# Patient Record
Sex: FEMALE | Race: BLACK OR AFRICAN AMERICAN | NOT HISPANIC OR LATINO | URBAN - METROPOLITAN AREA
[De-identification: names, ages, dates, MRNs, and addresses within clinical notes are randomized per-mention and may not be internally consistent; named-entity substitution may affect disease eponyms.]

---

## 2018-01-09 ENCOUNTER — APPOINTMENT (OUTPATIENT)
Dept: PHYSICAL THERAPY | Facility: CLINIC | Age: 58
End: 2018-01-09
Payer: COMMERCIAL

## 2018-01-09 PROCEDURE — 97163 PT EVAL HIGH COMPLEX 45 MIN: CPT

## 2018-01-09 PROCEDURE — G8991 OTHER PT/OT GOAL STATUS: HCPCS

## 2018-01-09 PROCEDURE — 97110 THERAPEUTIC EXERCISES: CPT

## 2018-01-09 PROCEDURE — G8990 OTHER PT/OT CURRENT STATUS: HCPCS

## 2018-01-15 ENCOUNTER — APPOINTMENT (OUTPATIENT)
Dept: PHYSICAL THERAPY | Facility: CLINIC | Age: 58
End: 2018-01-15
Payer: COMMERCIAL

## 2018-01-15 PROCEDURE — 97140 MANUAL THERAPY 1/> REGIONS: CPT

## 2018-01-15 PROCEDURE — 97110 THERAPEUTIC EXERCISES: CPT

## 2018-01-22 ENCOUNTER — APPOINTMENT (OUTPATIENT)
Dept: PHYSICAL THERAPY | Facility: CLINIC | Age: 58
End: 2018-01-22
Payer: COMMERCIAL

## 2018-01-22 PROCEDURE — 97110 THERAPEUTIC EXERCISES: CPT

## 2018-01-22 PROCEDURE — 97140 MANUAL THERAPY 1/> REGIONS: CPT

## 2018-01-23 ENCOUNTER — APPOINTMENT (OUTPATIENT)
Dept: PHYSICAL THERAPY | Facility: CLINIC | Age: 58
End: 2018-01-23
Payer: COMMERCIAL

## 2018-01-29 ENCOUNTER — OFFICE VISIT (OUTPATIENT)
Dept: PHYSICAL THERAPY | Facility: CLINIC | Age: 58
End: 2018-01-29
Payer: COMMERCIAL

## 2018-01-29 DIAGNOSIS — M25.552 PAIN OF LEFT HIP JOINT: Primary | ICD-10-CM

## 2018-01-29 PROCEDURE — 97112 NEUROMUSCULAR REEDUCATION: CPT | Performed by: PHYSICAL THERAPIST

## 2018-01-29 PROCEDURE — 97110 THERAPEUTIC EXERCISES: CPT | Performed by: PHYSICAL THERAPIST

## 2018-01-29 PROCEDURE — 97140 MANUAL THERAPY 1/> REGIONS: CPT | Performed by: PHYSICAL THERAPIST

## 2018-01-30 ENCOUNTER — APPOINTMENT (OUTPATIENT)
Dept: PHYSICAL THERAPY | Facility: CLINIC | Age: 58
End: 2018-01-30
Payer: COMMERCIAL

## 2018-01-30 NOTE — PROGRESS NOTES
Daily Note     Today's date: 2018  Patient name: Magalys Solano  : 1960  MRN: [id number]  Referring provider: Nallely Woody MD  Dx: No diagnosis found  Subjective: Patient reports that she had a decrease in pain following her last visit, however states that the pain increased on her walk home  She states that she is currently having pain of 8/10 in the anterior aspect on her left hip  She states that this increased with sitting for prolonged periods of time as well as performing sit to stand transfers  Objective: See treatment diary below  Precautions:   2 strokes (), (), mini stroke ),, both affected left side, angina (has nitroglycerine), Right hip DANIA (), Left , stomach surgery infection, hospitalized for HTN    Daily Treatment Diary     Manual  2018            Left hip flexor release  TK            Left hip PROM TK            Femoral Nerve glides TK                                          Exercise Diary  2018            Prone Press Up 2 x 10            Femoral Nerve Glides 15            Lateral walking 5 laps            Standing hip abd 2 x 10            Standing hip extension 2 x 10            [place] (to 90 degrees) 20            Total Gym Bilateral Squat  Level 3 2 x 10            Left leg single leg squat 2 x 10 level 3            Upright bike  10 min            Mini Squat 2 x 10                                                                                                                                                  Modalities                                                               Assessment: Patient reported no change in symptoms following manual treatment this visit  She continues to report increase in pain with all left hip PROM including flexion and ABD  Pain with flexion was abolished when performing in quadruped position  Patient was able to perform left single leg squats on total gym without increase in pain   She will continue to benefit from PT in order to improve left hip ROM and strength  Plan: Continue per plan of care

## 2018-02-05 ENCOUNTER — OFFICE VISIT (OUTPATIENT)
Dept: PHYSICAL THERAPY | Facility: CLINIC | Age: 58
End: 2018-02-05
Payer: COMMERCIAL

## 2018-02-05 DIAGNOSIS — M25.552 PAIN OF LEFT HIP JOINT: Primary | ICD-10-CM

## 2018-02-05 PROCEDURE — 97112 NEUROMUSCULAR REEDUCATION: CPT

## 2018-02-05 PROCEDURE — 97110 THERAPEUTIC EXERCISES: CPT

## 2018-02-05 PROCEDURE — 97140 MANUAL THERAPY 1/> REGIONS: CPT

## 2018-02-05 NOTE — PROGRESS NOTES
Daily Note     Today's date: 2018  Patient name: Evangelina Shirley  : 1960  MRN: [id number]  Referring provider: Jeannette Guerra MD  Dx:   Encounter Diagnosis   Name Primary?  Pain of left hip joint Yes         Subjective: Patient presented to clinic this morning with minimal complaints of pain in her low back but notes increased pain in her L hip  Objective: See treatment diary below  Precautions:   2 strokes (), (), mini stroke ),, both affected left side, angina (has nitroglycerine), Right hip DANIA (), Left , stomach surgery infection, hospitalized for HTN    Daily Treatment Diary     Manual  2018           Left hip flexor release  TK MM           Left hip PROM TK MM           Femoral Nerve glides TK MM                                         Exercise Diary             Prone Press Up 2 x 10 2x10           Femoral Nerve Glides 15 15           Lateral walking 5 laps 5 laps           Standing hip abd 2 x 10 2x10           Standing hip extension 2 x 10 2x10           [place] (to 90 degrees) 20 20           Total Gym Bilateral Squat  Level 3 2 x 10 Lvl 3 2x10           Left leg single leg squat 2 x 10 level 3 Lvl 3 2x10           Upright bike  10 min 10'           Mini Squat 2 x 10 2x10           Sit to stand high low table  2x10                                                                                                                                    Modalities                                                           Assessment: Patient reported no change in symptoms following manual treatment this visit  She continues to have pain with active movement, especially supine hip flexion  Guarding was noted with PROM today, and extreme tenderness in the hip flexor was noted with palpation  Continued with all exercises today having relief with standing/walking  No pain or increased symptoms present with standing Te  Plan: Continue per plan of care  Continue to improve LE strength as per PT POC

## 2018-02-06 ENCOUNTER — OFFICE VISIT (OUTPATIENT)
Dept: PHYSICAL THERAPY | Facility: CLINIC | Age: 58
End: 2018-02-06
Payer: COMMERCIAL

## 2018-02-06 DIAGNOSIS — M25.552 PAIN OF LEFT HIP JOINT: Primary | ICD-10-CM

## 2018-02-06 PROCEDURE — 97110 THERAPEUTIC EXERCISES: CPT | Performed by: PHYSICAL THERAPIST

## 2018-02-07 NOTE — PROGRESS NOTES
Daily Note     Today's date: 2018  Patient name: Wellington Deras  : 1960  MRN: [id number]  Referring provider: David Gregory MD  Dx:   Encounter Diagnosis   Name Primary?  Pain of left hip joint Yes         Subjective: Patient reports that she continues to have pain in her anterior left hip with prolonged walking and sitting  Objective: See treatment diary below  Precautions: **VITALS BEFORE AND AFTER VISIT**  2 strokes (), (), mini stroke ),, both affected left side, angina (has nitroglycerine), Right hip DANIA (), Left , stomach surgery infection, hospitalized for HTN     Daily Treatment Diary      Manual  2018                 Left hip flexor release  TK MM                   Left hip PROM TK MM                   Femoral Nerve glides TK MM                                                                         Exercise Diary    2                 Prone Press Up 2 x 10 2x10                   Femoral Nerve Glides 15 15                   Lateral walking 5 laps 5 laps                   Standing hip abd 2 x 10 2x10                   Standing hip extension 2 x 10 2x10                   [place] (to 90 degrees) 20 20 20                 Total Gym Bilateral Squat  Level 3 2 x 10 Lvl 3 2x10                   Left leg single leg squat 2 x 10 level 3 Lvl 3 2x10                   Upright bike  10 min 10'  10'                 Mini Squat 2 x 10 2x10                   Sit to stand high low table   2x10                                                                                                                                                                                                                                                 Modalities                                                                                                       Assessment:  Patient reported dizziness following completion of quad rocks exercise   Upon taking blood pressure with mechanical wrist cuff in sitting blood pressure was 227/123  Manual blood pressure was taken immediately following this reading by PT (DB) and read 210/126  BP was taken by PT (TK) manually and read 220/136  Patient reported feeling asymptomatic following second blood pressure  She states that she has been taking her BP as prescribed  911 was called and patient was escorted lobby  Patient was supervised by PT (BRANNON) until paramedics arrived     Plan: Patient will be placed on hold from physical therapy until medically cleared

## 2018-02-12 ENCOUNTER — APPOINTMENT (OUTPATIENT)
Dept: PHYSICAL THERAPY | Facility: CLINIC | Age: 58
End: 2018-02-12
Payer: COMMERCIAL

## 2018-02-27 ENCOUNTER — APPOINTMENT (OUTPATIENT)
Dept: PHYSICAL THERAPY | Facility: CLINIC | Age: 58
End: 2018-02-27
Payer: COMMERCIAL

## 2020-10-13 ENCOUNTER — TELEPHONE (OUTPATIENT)
Dept: PAIN MEDICINE | Facility: CLINIC | Age: 60
End: 2020-10-13

## 2020-11-09 ENCOUNTER — CONSULT (OUTPATIENT)
Dept: PAIN MEDICINE | Facility: CLINIC | Age: 60
End: 2020-11-09
Payer: COMMERCIAL

## 2020-11-09 VITALS
RESPIRATION RATE: 18 BRPM | WEIGHT: 183 LBS | DIASTOLIC BLOOD PRESSURE: 80 MMHG | TEMPERATURE: 97.1 F | SYSTOLIC BLOOD PRESSURE: 120 MMHG | BODY MASS INDEX: 25.62 KG/M2 | HEART RATE: 74 BPM | HEIGHT: 71 IN

## 2020-11-09 DIAGNOSIS — M47.812 CERVICAL SPONDYLOSIS: ICD-10-CM

## 2020-11-09 DIAGNOSIS — M54.12 CERVICAL RADICULOPATHY: Primary | ICD-10-CM

## 2020-11-09 DIAGNOSIS — M79.18 MYOFASCIAL PAIN SYNDROME: ICD-10-CM

## 2020-11-09 DIAGNOSIS — M48.02 FORAMINAL STENOSIS OF CERVICAL REGION: ICD-10-CM

## 2020-11-09 PROCEDURE — 99244 OFF/OP CNSLTJ NEW/EST MOD 40: CPT | Performed by: STUDENT IN AN ORGANIZED HEALTH CARE EDUCATION/TRAINING PROGRAM

## 2020-11-09 RX ORDER — CYCLOBENZAPRINE HCL 10 MG
10 TABLET ORAL 3 TIMES DAILY PRN
Qty: 60 TABLET | Refills: 0 | Status: SHIPPED | OUTPATIENT
Start: 2020-11-09

## 2020-11-09 RX ORDER — FAMOTIDINE 20 MG/1
20 TABLET, FILM COATED ORAL 2 TIMES DAILY
COMMUNITY
End: 2022-04-26

## 2020-11-09 RX ORDER — GABAPENTIN 600 MG/1
800 TABLET ORAL 3 TIMES DAILY
COMMUNITY
Start: 2020-10-27

## 2020-11-09 RX ORDER — NIFEDIPINE 60 MG/1
TABLET, FILM COATED, EXTENDED RELEASE ORAL
COMMUNITY
Start: 2020-10-26

## 2020-11-09 RX ORDER — HYDRALAZINE HYDROCHLORIDE 50 MG/1
50 TABLET, FILM COATED ORAL 3 TIMES DAILY
COMMUNITY
Start: 2020-06-08

## 2020-11-09 RX ORDER — RENAGEL 800 MG/1
TABLET ORAL
COMMUNITY
Start: 2020-10-10

## 2020-11-09 RX ORDER — NITROGLYCERIN 0.4 MG/1
TABLET SUBLINGUAL
COMMUNITY
Start: 2020-10-19

## 2020-11-09 RX ORDER — AMLODIPINE BESYLATE 10 MG/1
TABLET ORAL
COMMUNITY
Start: 2020-10-26

## 2020-11-09 RX ORDER — LOSARTAN POTASSIUM 50 MG/1
TABLET ORAL
COMMUNITY
Start: 2020-06-08

## 2020-11-09 RX ORDER — FERROUS SULFATE 325(65) MG
65 TABLET ORAL
COMMUNITY
Start: 2011-09-21

## 2020-11-09 RX ORDER — ATORVASTATIN CALCIUM 40 MG/1
TABLET, FILM COATED ORAL
COMMUNITY
Start: 2020-10-19

## 2020-11-24 ENCOUNTER — HOSPITAL ENCOUNTER (OUTPATIENT)
Dept: RADIOLOGY | Facility: CLINIC | Age: 60
Discharge: HOME | End: 2020-11-24
Attending: STUDENT IN AN ORGANIZED HEALTH CARE EDUCATION/TRAINING PROGRAM
Payer: COMMERCIAL

## 2020-11-24 VITALS
HEART RATE: 66 BPM | OXYGEN SATURATION: 93 % | DIASTOLIC BLOOD PRESSURE: 85 MMHG | RESPIRATION RATE: 20 BRPM | TEMPERATURE: 96.7 F | SYSTOLIC BLOOD PRESSURE: 110 MMHG

## 2020-11-24 DIAGNOSIS — M54.12 CERVICAL RADICULOPATHY: ICD-10-CM

## 2020-11-24 PROCEDURE — 62321 NJX INTERLAMINAR CRV/THRC: CPT | Performed by: STUDENT IN AN ORGANIZED HEALTH CARE EDUCATION/TRAINING PROGRAM

## 2020-11-24 RX ORDER — 0.9 % SODIUM CHLORIDE 0.9 %
4 VIAL (ML) INJECTION ONCE
Status: COMPLETED | OUTPATIENT
Start: 2020-11-24 | End: 2020-11-24

## 2020-11-24 RX ORDER — LIDOCAINE HYDROCHLORIDE 10 MG/ML
5 INJECTION, SOLUTION EPIDURAL; INFILTRATION; INTRACAUDAL; PERINEURAL ONCE
Status: COMPLETED | OUTPATIENT
Start: 2020-11-24 | End: 2020-11-24

## 2020-11-24 RX ORDER — METHYLPREDNISOLONE ACETATE 80 MG/ML
80 INJECTION, SUSPENSION INTRA-ARTICULAR; INTRALESIONAL; INTRAMUSCULAR; PARENTERAL; SOFT TISSUE ONCE
Status: COMPLETED | OUTPATIENT
Start: 2020-11-24 | End: 2020-11-24

## 2020-11-24 RX ADMIN — METHYLPREDNISOLONE ACETATE 80 MG: 80 INJECTION, SUSPENSION INTRA-ARTICULAR; INTRALESIONAL; INTRAMUSCULAR; PARENTERAL; SOFT TISSUE at 09:34

## 2020-11-24 RX ADMIN — IOHEXOL 0.5 ML: 300 INJECTION, SOLUTION INTRAVENOUS at 09:29

## 2020-11-24 RX ADMIN — LIDOCAINE HYDROCHLORIDE 5 ML: 10 INJECTION, SOLUTION EPIDURAL; INFILTRATION; INTRACAUDAL; PERINEURAL at 09:27

## 2020-11-24 RX ADMIN — SODIUM CHLORIDE 4 ML: 9 INJECTION INTRAMUSCULAR; INTRAVENOUS; SUBCUTANEOUS at 09:35

## 2020-12-01 ENCOUNTER — TELEPHONE (OUTPATIENT)
Dept: PAIN MEDICINE | Facility: CLINIC | Age: 60
End: 2020-12-01

## 2020-12-01 NOTE — TELEPHONE ENCOUNTER
Pt reports 10% improvement post inj   Pain level she said is very high today   Pt aware I will call next week for an update

## 2021-03-11 ENCOUNTER — TRANSCRIBE ORDERS (OUTPATIENT)
Dept: NEPHROLOGY | Facility: CLINIC | Age: 61
End: 2021-03-11

## 2021-03-11 ENCOUNTER — TELEPHONE (OUTPATIENT)
Dept: NEPHROLOGY | Facility: CLINIC | Age: 61
End: 2021-03-11

## 2021-03-11 DIAGNOSIS — N18.30 STAGE 3 CHRONIC KIDNEY DISEASE (HCC): Primary | ICD-10-CM

## 2021-03-12 ENCOUNTER — TELEPHONE (OUTPATIENT)
Dept: NEPHROLOGY | Facility: CLINIC | Age: 61
End: 2021-03-12

## 2021-03-18 LAB — HCV AB SER-ACNC: NEGATIVE

## 2021-03-22 ENCOUNTER — OFFICE VISIT (OUTPATIENT)
Dept: PAIN MEDICINE | Facility: CLINIC | Age: 61
End: 2021-03-22
Payer: COMMERCIAL

## 2021-03-22 VITALS
BODY MASS INDEX: 26.77 KG/M2 | HEART RATE: 85 BPM | WEIGHT: 191.2 LBS | DIASTOLIC BLOOD PRESSURE: 78 MMHG | RESPIRATION RATE: 16 BRPM | SYSTOLIC BLOOD PRESSURE: 111 MMHG | HEIGHT: 71 IN

## 2021-03-22 DIAGNOSIS — M75.102 TEAR OF LEFT ROTATOR CUFF, UNSPECIFIED TEAR EXTENT, UNSPECIFIED WHETHER TRAUMATIC: ICD-10-CM

## 2021-03-22 DIAGNOSIS — M25.512 CHRONIC LEFT SHOULDER PAIN: ICD-10-CM

## 2021-03-22 DIAGNOSIS — M48.02 CERVICAL SPINAL STENOSIS: ICD-10-CM

## 2021-03-22 DIAGNOSIS — M47.812 CERVICAL SPONDYLOSIS: ICD-10-CM

## 2021-03-22 DIAGNOSIS — M54.12 CERVICAL RADICULOPATHY: Primary | ICD-10-CM

## 2021-03-22 DIAGNOSIS — G89.29 CHRONIC LEFT SHOULDER PAIN: ICD-10-CM

## 2021-03-22 PROCEDURE — 99214 OFFICE O/P EST MOD 30 MIN: CPT | Performed by: STUDENT IN AN ORGANIZED HEALTH CARE EDUCATION/TRAINING PROGRAM

## 2021-03-22 NOTE — PROGRESS NOTES
Assessment:  1  Cervical radiculopathy    2  Cervical spinal stenosis    3  Cervical spondylosis    4  Chronic left shoulder pain    5  Tear of left rotator cuff, unspecified tear extent, unspecified whether traumatic        Plan:   this is a 57-year-old female who returns to our office with chief complaint of  Left-sided neck, left trapezius, left shoulder  Pain and left upper extremity radiculopathy  Again I believe her symptoms are multifactorial   She has notable cervical spondylosis with tenderness to palpation over left cervical facet joints, along with myofascial tenderness of the left trapezius musculature  She does continue to have left shoulder pain in the setting of supraspinatus and infraspinatus tear  She is a surgical candidate for this, but just had right rotator cuff repaired and is currently in therapy for this  She continues to have cervical radiculopathy as well  She reports that her neck and cervical radiculopathy symptoms were improved with epidural injection back in November  She did not have notable relief after the 1st week, but thereafter started to have much more improvement in her symptoms with about 50% improvement at least thereafter  She reports that her worsening symptoms today are similar to those that benefit from the injection  She is eager to get back to work  We discussed repeat left parasagittal C7-T1 cervical epidural steroid injection to help more immediately with her symptoms  Complete risks and benefits including bleeding, infection, tissue reaction, nerve injury and allergic reaction were discussed  The approach was demonstrated using models and literature was provided  Verbal and written consent was obtained  I again discussed with patient that she is a candidate for cervical MBB /RFA in the future  We will discuss this after her epidural injection      [place] Prescription Drug Monitoring Program report was reviewed and was appropriate     My impressions and treatment recommendations were discussed in detail with the patient who verbalized understanding and had no further questions  Discharge instructions were provided  I personally saw and examined the patient and I agree with the above discussed plan of care  Orders Placed This Encounter   Procedures    FL spine and pain procedure     Standing Status:   Future     Standing Expiration Date:   3/22/2025     Order Specific Question:   Reason for Exam:     Answer:   left parasaggital C7-T1 GEOVANNA     Order Specific Question:   Anticoagulant hold needed? Answer:   NSAID     No orders of the defined types were placed in this encounter  History of Present Illness:  Gladis Calix is a 63 y o  female who presents for a follow up office visit in regards to Shoulder Pain (left) and Arm Pain (left)  The patients current symptoms include   Left-sided neck, left shoulder  Pain and left upper extremity radiculopathy in what appears to be a C6/C7 dermatomal distribution  Pain goes down the arm with numbness and tingling in the thumb, index finger, and middle finger  She was last seen on 11/24/2020 at which time she underwent C7-T1 cervical epidural steroid injection  For the 1st week, patient had little relief, however thereafter she began noticing significant relief and reports at least 50% of thereafter  Recently reports worsening of the same symptoms  Since her last visit, she underwent right rotator cuff repair with Dr Mitul Rick  She is candidate for  Left rotator cuff repair in setting of supraspinatus and infraspinatus tearing  However, she is just starting physical therapy for her right shoulder  She is also eager to get back to work and therefore is discussing repeating injection today  Current pain score is 910  Pain is constant and noted as sharp, throbbing, numbness, and pins and needles      I have personally reviewed and/or updated the patient's past medical history, past surgical history, family history, social history, current medications, allergies, and vital signs today  Review of Systems   Musculoskeletal:        Joint stiffness and swelling       Patient Active Problem List   Diagnosis    Stroke [facility])    Essential hypertension    Abdominal pain    Nausea and/or vomiting    Syncope    Stroke-like symptoms    Bradycardia       Past Medical History:   Diagnosis Date    Glaucoma     Hypertension     Migraine     Night muscle spasms     Stroke ([facility] [address] )     2 strokes in past,  and        Past Surgical History:   Procedure Laterality Date    ESOPHAGOGASTRODUODENOSCOPY N/A 2016    Procedure: ESOPHAGOGASTRODUODENOSCOPY (EGD); Surgeon: Alo Blanco MD;  [facility]; Service:     HIP ARTHROPLASTY Bilateral        History reviewed  No pertinent family history  Social History     Occupational History    Not on file   Tobacco Use    Smoking status: Former Smoker     Packs/day: 0 25     Years: 10 00     Pack years: 2 50     Types: Cigarettes     Quit date: 2021     Years since quittin 2    Smokeless tobacco: Never Used    Tobacco comment: refused   Substance and Sexual Activity    Alcohol use: Yes    Drug use: No    Sexual activity: Not on file       Current Outpatient Medications on File Prior to Visit   Medication Sig    amLODIPine (NORVASC) 10 mg tablet TAKE ONE TABLET BY MOUTH EVERY MORNING    atorvastatin (LIPITOR) 40 mg tablet     Aubagio 14 MG TABS     cyclobenzaprine (FLEXERIL) 10 mg tablet Take 1 tablet (10 mg total) by mouth 3 (three) times a day as needed for muscle spasms    famotidine (PEPCID) 20 mg tablet Take 20 mg by mouth 2 (two) times a day    ferrous sulfate 325 (65 Fe) mg tablet Take 65 mg by mouth    gabapentin (NEURONTIN) 600 MG tablet     hydrALAZINE (APRESOLINE) 50 mg tablet Take 50 mg by mouth Three times a day    ibuprofen (MOTRIN) 600 mg tablet Take 600 mg by mouth 3 (three) times a day      latanoprost (XALATAN) 0 005 % ophthalmic solution Administer 1 drop to both eyes daily   losartan (COZAAR) 50 mg tablet Take one tablet by mouth once a day    NIFEdipine ER (ADALAT CC) 60 MG 24 hr tablet TAKE TWO TABLETS BY MOUTH EVERY DAY    nitroglycerin (NITROSTAT) 0 4 mg SL tablet DISSOLVE 1 TABLET UNDER TONGUE EVERY 5 MINUTES FOR UP TO 3 DOSES AS NEEDED FOR CHEST PAIN, CALL 911 IF PAIN PERSISTS OR UNRELIEVED    timolol (BETIMOL) 0 5 % ophthalmic solution Administer 1 drop to both eyes daily  No current facility-administered medications on file prior to visit  Allergies   Allergen Reactions    Acetaminophen Swelling    Aspirin Swelling    Lisinopril Swelling       Physical Exam:    /78   Pulse 85   Resp 16   Ht 5' 11" (1 803 m)   Wt 86 7 kg (191 lb 3 2 oz)   BMI 26 67 kg/m²     Constitutional:normal, well developed, well nourished, alert, in no distress and non-toxic and no overt pain behavior  Eyes:anicteric  HEENT:grossly intact  Neck:supple, symmetric, trachea midline and no masses   Pulmonary:even and unlabored  Cardiovascular:No edema or pitting edema present  Skin:Normal without rashes or lesions and well hydrated  Psychiatric:Mood and affect appropriate  Neurologic:Cranial Nerves II-XII grossly intact  Musculoskeletal:  Tenderness to palpation over  Left cervical facet joints  Increased neck pain in all ranges of motion  Tenderness to palpation over left AC joint and subacromial region  Significant supraspinatus weakness on the left    Imaging  MRI of cervical spine  History: Pain  Neck pain    Technique: Using a surface coil sagittal and axial T1-weighted and T2-weighted  scans were obtained of the cervical spine  FINDINGS: Image detail is markedly suboptimal due to motion artifacts  Vertebral bodies are intact and alignment is anatomic  No fracture  No lytic  lesion  There is diminished AP diameter of the spinal canal from the level of C3-C4 to  the level of C7-T1      There are degenerative changes of facet joints bilaterally at multiple levels  Some degenerative changes are marked  C2-C3: Moderate degenerative changes in endplates    D1-D5: Marked degenerative changes in endplates    N5-Y2: Marked degenerative changes in endplates    C3-N6: Marked degenerative changes in endplates    T1-D2: Marked degenerative changes in endplates    L7-U7: Marked degenerative changes in endplates  There is central spinal stenosis at C4-C5, C7-T1  There is right foraminal stenosis at C4-C5 through C7-T1  There is left foraminal stenosis at C2-C3 through C7-T1  Other Result Information   Interface, Rad Results In - 10/30/2020  3:25 PM EDT  MRI of cervical spine  History: Pain  Neck pain    Technique: Using a surface coil sagittal and axial T1-weighted and T2-weighted  scans were obtained of the cervical spine  FINDINGS: Image detail is markedly suboptimal due to motion artifacts  Vertebral bodies are intact and alignment is anatomic  No fracture  No lytic  lesion  There is diminished AP diameter of the spinal canal from the level of C3-C4 to  the level of C7-T1  There are degenerative changes of facet joints bilaterally at multiple levels  Some degenerative changes are marked  C2-C3: Moderate degenerative changes in endplates    M4-S5: Marked degenerative changes in endplates    K6-R3: Marked degenerative changes in endplates    J0-N3: Marked degenerative changes in endplates    N1-P4: Marked degenerative changes in endplates    O1-N4: Marked degenerative changes in endplates  There is central spinal stenosis at C4-C5, C7-T1  There is right foraminal stenosis at C4-C5 through C7-T1  There is left foraminal stenosis at C2-C3 through C7-T1  IMPRESSION:  IMPRESSION:  1  Markedly suboptimal study due to motion artifact  2  Marked spondylosis    3  Diminished AP diameter of spinal canal   4  Central stenosis at C4-C5, C7-T1   5  Bilateral foraminal stenosis at multiple levels  See above  MRI SHOULDER LEFT WO [facility]  Result Impression   IMPRESSION:  Tendinosis of the supraspinatus and infraspinatus with partial width  full-thickness insertional supraspinatus tear  Subacromial spur,  subacromial/deltoid bursitis  Tendinosis intra-articular long head biceps  Workstation:[id number]   Result Narrative   History: Left shoulder pain  Study: Noncontrast multiplanar MR examination of the left shoulder was performed  on a 1 5 Emily magnet  COMPARISON: None  FINDINGS:  The acromioclavicular joint is anatomically aligned without significant  degenerative change  There is a moderate subacromial spur  Moderate  subacromial/subdeltoid bursitis is present  There is tendinosis of the supraspinatus and infraspinatus insertions, with an  essentially full-thickness partial width tear at the supraspinatus insertion  measuring 1 4 cm medial collateral, 0 7 cm AP, without retraction  The  subscapularis and teres minor are intact  Bulge and signal of the rotator cuff  musculature is relatively preserved  Long head of the biceps tendon is within the bicipital groove  Intra-articular  portion is tendinotic  Biceps labral anchor is intact  No detached labral tear  identified  Inferior joint capsule is intact  No glenohumeral joint effusion  There is full or near full-thickness cartilage  loss along the inferior aspect of glenoid rim and adjacent femoral head, minimal  humeral head spurring  Other Result Information   Interface, Rad Results In - 08/31/2020  2:05 PM EDT  History: Left shoulder pain  Study: Noncontrast multiplanar MR examination of the left shoulder was performed  on a 1 5 Emily magnet  COMPARISON: None  FINDINGS:  The acromioclavicular joint is anatomically aligned without significant  degenerative change  There is a moderate subacromial spur  Moderate  subacromial/subdeltoid bursitis is present      There is tendinosis of the supraspinatus and infraspinatus insertions, with an  essentially full-thickness partial width tear at the supraspinatus insertion  measuring 1 4 cm medial collateral, 0 7 cm AP, without retraction  The  subscapularis and teres minor are intact  Bulge and signal of the rotator cuff  musculature is relatively preserved  Long head of the biceps tendon is within the bicipital groove  Intra-articular  portion is tendinotic  Biceps labral anchor is intact  No detached labral tear  identified  Inferior joint capsule is intact  No glenohumeral joint effusion  There is full or near full-thickness cartilage  loss along the inferior aspect of glenoid rim and adjacent femoral head, minimal  humeral head spurring  IMPRESSION:  IMPRESSION:  Tendinosis of the supraspinatus and infraspinatus with partial width full-thickness insertional supraspinatus tear  Subacromial spur,  subacromial/deltoid bursitis  Tendinosis intra-articular long head biceps

## 2021-03-30 ENCOUNTER — TELEPHONE (OUTPATIENT)
Dept: PAIN MEDICINE | Facility: CLINIC | Age: 61
End: 2021-03-30

## 2021-03-30 NOTE — TELEPHONE ENCOUNTER
Patient stopped in and had questions about her procedure and the vaccine shot  She is schled for her first covid shot thurs April 1 st  And her procedure is schled for 4/6  I explained the procedure has to be at least 2 weeks after  I told her I would have you call her to reschl   She wants to keep the vaccine appt she stated she has waited to long for it   Please call

## 2021-03-30 NOTE — TELEPHONE ENCOUNTER
S/w pt and advised no vaccine 2 weeks prior/post GEOVANNA  Pt having 1st COVID vaccine on 4/1/21 and will call back with date of 2nd vaccine and will reschedule GEOVANNA accordingly

## 2021-05-13 ENCOUNTER — HOSPITAL ENCOUNTER (OUTPATIENT)
Dept: RADIOLOGY | Facility: CLINIC | Age: 61
Discharge: HOME | End: 2021-05-13
Attending: STUDENT IN AN ORGANIZED HEALTH CARE EDUCATION/TRAINING PROGRAM | Admitting: STUDENT IN AN ORGANIZED HEALTH CARE EDUCATION/TRAINING PROGRAM
Payer: COMMERCIAL

## 2021-05-13 VITALS
DIASTOLIC BLOOD PRESSURE: 89 MMHG | OXYGEN SATURATION: 96 % | HEART RATE: 63 BPM | SYSTOLIC BLOOD PRESSURE: 134 MMHG | RESPIRATION RATE: 20 BRPM | TEMPERATURE: 97 F

## 2021-05-13 DIAGNOSIS — M47.812 CERVICAL SPONDYLOSIS: ICD-10-CM

## 2021-05-13 DIAGNOSIS — M54.12 CERVICAL RADICULOPATHY: ICD-10-CM

## 2021-05-13 PROCEDURE — 62321 NJX INTERLAMINAR CRV/THRC: CPT | Performed by: STUDENT IN AN ORGANIZED HEALTH CARE EDUCATION/TRAINING PROGRAM

## 2021-05-13 RX ORDER — METHYLPREDNISOLONE ACETATE 80 MG/ML
80 INJECTION, SUSPENSION INTRA-ARTICULAR; INTRALESIONAL; INTRAMUSCULAR; PARENTERAL; SOFT TISSUE ONCE
Status: COMPLETED | OUTPATIENT
Start: 2021-05-13 | End: 2021-05-13

## 2021-05-13 RX ADMIN — METHYLPREDNISOLONE ACETATE 80 MG: 80 INJECTION, SUSPENSION INTRA-ARTICULAR; INTRALESIONAL; INTRAMUSCULAR; PARENTERAL; SOFT TISSUE at 09:18

## 2021-05-13 RX ADMIN — IOHEXOL 1 ML: 300 INJECTION, SOLUTION INTRAVENOUS at 09:18

## 2021-05-13 NOTE — H&P
History of Present Illness: The patient is a 57 y o  female who presents with complaints of neck pain and cervical radiculopathy    Patient Active Problem List   Diagnosis    Stroke [facility])    Essential hypertension    Abdominal pain    Nausea and/or vomiting    Syncope    Stroke-like symptoms    Bradycardia       Past Medical History:   Diagnosis Date    Glaucoma     Hypertension     Migraine     Night muscle spasms     Stroke ([facility] [address] )     2 strokes in past, 2011 and 2013       Past Surgical History:   Procedure Laterality Date    ESOPHAGOGASTRODUODENOSCOPY N/A 8/9/2016    Procedure: ESOPHAGOGASTRODUODENOSCOPY (EGD); Surgeon: Azucena Boateng MD;  [facility]; Service:     HIP ARTHROPLASTY Bilateral          Current Outpatient Medications:     amLODIPine (NORVASC) 10 mg tablet, TAKE ONE TABLET BY MOUTH EVERY MORNING, Disp: , Rfl:     atorvastatin (LIPITOR) 40 mg tablet, , Disp: , Rfl:     Aubagio 14 MG TABS, , Disp: , Rfl:     cyclobenzaprine (FLEXERIL) 10 mg tablet, Take 1 tablet (10 mg total) by mouth 3 (three) times a day as needed for muscle spasms, Disp: 60 tablet, Rfl: 0    famotidine (PEPCID) 20 mg tablet, Take 20 mg by mouth 2 (two) times a day, Disp: , Rfl:     ferrous sulfate 325 (65 Fe) mg tablet, Take 65 mg by mouth, Disp: , Rfl:     gabapentin (NEURONTIN) 600 MG tablet, , Disp: , Rfl:     hydrALAZINE (APRESOLINE) 50 mg tablet, Take 50 mg by mouth Three times a day, Disp: , Rfl:     ibuprofen (MOTRIN) 600 mg tablet, Take 600 mg by mouth 3 (three) times a day , Disp: , Rfl:     latanoprost (XALATAN) 0 005 % ophthalmic solution, Administer 1 drop to both eyes daily  , Disp: , Rfl:     losartan (COZAAR) 50 mg tablet, Take one tablet by mouth once a day, Disp: , Rfl:     NIFEdipine ER (ADALAT CC) 60 MG 24 hr tablet, TAKE TWO TABLETS BY MOUTH EVERY DAY, Disp: , Rfl:     nitroglycerin (NITROSTAT) 0 4 mg SL tablet, DISSOLVE 1 TABLET UNDER TONGUE EVERY 5 MINUTES FOR UP TO 3 DOSES AS NEEDED FOR CHEST PAIN, CALL 911 IF PAIN PERSISTS OR UNRELIEVED, Disp: , Rfl:     timolol (BETIMOL) 0 5 % ophthalmic solution, Administer 1 drop to both eyes daily  , Disp: , Rfl:     Current Facility-Administered Medications:     iohexol (OMNIPAQUE) 300 mg/mL injection 50 mL, 50 mL, Epidural, Once, Giovanny Dixon MD    methylPREDNISolone acetate (DEPO-MEDROL) injection 80 mg, 80 mg, Epidural, Once, Giovanny Dixon MD    Allergies   Allergen Reactions    Acetaminophen Swelling    Aspirin Swelling    Lisinopril Swelling       Physical Exam: There were no vitals filed for this visit  General: Awake, Alert, Oriented x 3, Mood and affect appropriate  Respiratory: Respirations even and unlabored  Cardiovascular: Peripheral pulses intact; no edema  Musculoskeletal Exam: neck pain and cervical radiculopathy    ASA Score: 3    Patient/Chart Verification  Patient ID Verified: Verbal  ID Band Applied: No  Consents Confirmed: To be obtained in the Pre-Procedure area  H&P( within 30 days) Verified: To be obtained in the Pre-Procedure area  Interval H&P(within 24 hr) Complete (required for Outpatients and Surgery Admit only): To be obtained in the Pre-Procedure area  Allergies Reviewed: Yes  Anticoag/NSAID held?: Yes(ibuprofen greater than 24 hours)  Currently on antibiotics?: No  Pregnancy denied?: NA    Assessment:   1  Cervical radiculopathy    2   Cervical spondylosis        Plan: left parasaggital C7-T1 GEOVANNA

## 2021-05-13 NOTE — DISCHARGE INSTR - LAB
Epidural Steroid Injection   WHAT YOU NEED TO KNOW:   An epidural steroid injection (JADA) is a procedure to inject steroid medicine into the epidural space  The epidural space is between your spinal cord and vertebrae  Steroids reduce inflammation and fluid buildup in your spine that may be causing pain  You may be given pain medicine along with the steroids  ACTIVITY  · Do not drive or operate machinery today  · No strenuous activity today - bending, lifting, etc   · You may resume normal activites starting tomorrow - start slowly and as tolerated  · You may shower today, but no tub baths or hot tubs  · You may have numbness for several hours from the local anesthetic  Please use caution and common sense, especially with weight-bearing activities  CARE OF THE INJECTION SITE  · If you have soreness or pain, apply ice to the area today (20 minutes on/20 minutes off)  · Starting tomorrow, you may use warm, moist heat or ice if needed  · You may have an increase or change in your discomfort for 36-48 hours after your treatment  · Apply ice and continue with any pain medication you have been prescribed  · [facility]ify [facility] if you have any of the following: redness, drainage, swelling, headache, stiff neck or fever above 100°F     SPECIAL INSTRUCTIONS  · Our office will contact you in approximately 7 days for a progress report  MEDICATIONS  · Continue to take all routine medications  · Our office may have instructed you to hold some medications  As no general anesthesia was used in today's procedure, you should not experience any side effects related to anesthesia  If you have a problem specifically related to your procedure, please call our office at [phone]  Problems not related to your procedure should be directed to your primary care physician

## 2021-05-18 ENCOUNTER — TELEPHONE (OUTPATIENT)
Dept: NEPHROLOGY | Facility: CLINIC | Age: 61
End: 2021-05-18

## 2021-05-20 ENCOUNTER — TELEPHONE (OUTPATIENT)
Dept: PAIN MEDICINE | Facility: CLINIC | Age: 61
End: 2021-05-20

## 2021-05-28 ENCOUNTER — OFFICE VISIT (OUTPATIENT)
Dept: PAIN MEDICINE | Facility: CLINIC | Age: 61
End: 2021-05-28
Payer: COMMERCIAL

## 2021-05-28 VITALS
HEART RATE: 66 BPM | RESPIRATION RATE: 18 BRPM | DIASTOLIC BLOOD PRESSURE: 86 MMHG | HEIGHT: 71 IN | WEIGHT: 191 LBS | SYSTOLIC BLOOD PRESSURE: 126 MMHG | BODY MASS INDEX: 26.74 KG/M2

## 2021-05-28 DIAGNOSIS — M54.16 LUMBAR RADICULOPATHY: Primary | ICD-10-CM

## 2021-05-28 DIAGNOSIS — M47.816 LUMBAR FACET ARTHROPATHY: ICD-10-CM

## 2021-05-28 PROCEDURE — 99214 OFFICE O/P EST MOD 30 MIN: CPT | Performed by: STUDENT IN AN ORGANIZED HEALTH CARE EDUCATION/TRAINING PROGRAM

## 2021-05-28 NOTE — PROGRESS NOTES
Assessment:  1  Lumbar radiculopathy    2  Lumbar facet arthropathy        Plan: This is a 57-year-old female who presents to our office with new chief complaint of bilateral low back pain, left far  She has tenderness palpation the lumbar facet joints tenderness to palpation over joints  This is in the setting advanced facet arthropathy noted on previous and current imaging  She has positive facet loading the left as well  Appears that she is having significant axial low back pain secondary to facet mediated disease  It in addition to that, is having left lumbar radiculopathy symptoms with decreased sensation to light touch over the L5 dermatome and left dorsiflexion and plantar flexion weakness  She continues to follow with Dr Maryam Earl of [facility]  She has positive seated straight leg raise on the left as well  Given the radicular component of her pain, I discussed left L4 and L5 transforaminal epidural steroid injection under fluoroscopic guidance to provide more immediate pain relief  In the future, she would certainly be candidate for left L3-4, L4-5, and L5-S1 medial branch block  Followed by rhizotomy if greater than 50% relief  However, we discussed starting with epidural given radiculopathy and more immediate pain control  [facility] Prescription Drug Monitoring Program report was reviewed and was appropriate     Complete risks and benefits including bleeding, infection, tissue reaction, nerve injury and allergic reaction were discussed  The approach was demonstrated using models and literature was provided  Verbal and written consent was obtained  My impressions and treatment recommendations were discussed in detail with the patient who verbalized understanding and had no further questions  Discharge instructions were provided  I personally saw and examined the patient and I agree with the above discussed plan of care      Orders Placed This Encounter   Procedures    FL spine and pain procedure     Standing Status:   Future     Standing Expiration Date:   5/28/2025     Order Specific Question:   Reason for Exam:     Answer:   left L4 and L5 TFESI     Order Specific Question:   Anticoagulant hold needed? Answer:   No     No orders of the defined types were placed in this encounter  History of Present Illness:  Stormy Rondon is a 57 y o  female who presents for a follow up office visit in regards to Back Pain and Leg Pain (LLE)  The patients current symptoms include Bilateral low back pain  This is a chronic issue  Current pain score is 10/10  She was recently seen for cervical epidural steroid injection on 05/13/2021 with about 80% improvement in her symptoms  Today she is here for lower back she reports that when she stands for long the 15 minutes she begins to have significant pain  Pain is described as sharp, throbbing, shooting, pins and needles  Pain across the lower back but worse on left side  Reports it goes down posterior thigh up to the knee on the left occasionally  Reports pins and needles in foot  Has had MRI   2020 demonstrating foraminal disc herniation at L5 possibly impinging the left L5 nerve  She reports that she starts has significant pain in the leg when she stands for prolonged periods of time  I have personally reviewed and/or updated the patient's past medical history, past surgical history, family history, social history, current medications, allergies, and vital signs today  Review of Systems   Respiratory: Positive for shortness of breath  Cardiovascular: Negative for chest pain  Gastrointestinal: Negative for constipation, diarrhea, nausea and vomiting  Musculoskeletal: Positive for back pain and joint swelling  Negative for arthralgias, gait problem and myalgias  LLE Pain   Skin: Negative for rash  Neurological: Negative for dizziness, seizures and weakness     Psychiatric/Behavioral: Positive for decreased concentration  All other systems reviewed and are negative  Patient Active Problem List   Diagnosis    [facility]roke [facility])    Essential hypertension    Abdominal pain    Nausea and/or vomiting    Syncope    Stroke-like symptoms    Bradycardia       Past Medical History:   Diagnosis Date    Glaucoma     Hypertension     Migraine     Night muscle spasms     Stroke ([address] )     2 strokes in past,  and        Past Surgical History:   Procedure Laterality Date    ESOPHAGOGASTRODUODENOSCOPY N/A 2016    Procedure: ESOPHAGOGASTRODUODENOSCOPY (EGD); Surgeon: Naz De Santiago MD;  [facility]; Service:     HIP ARTHROPLASTY Bilateral        History reviewed  No pertinent family history  Social History     Occupational History    Not on file   Tobacco Use    Smoking status: Former Smoker     Packs/day: 0 25     Years: 10 00     Pack years: 2 50     Types: Cigarettes     Quit date: 2021     Years since quittin 4    Smokeless tobacco: Never Used    Tobacco comment: refused   Substance and Sexual Activity    Alcohol use: Yes    Drug use: No    Sexual activity: Not on file       Current Outpatient Medications on File Prior to Visit   Medication Sig    amLODIPine (NORVASC) 10 mg tablet TAKE ONE TABLET BY MOUTH EVERY MORNING    atorvastatin (LIPITOR) 40 mg tablet     Aubagio 14 MG TABS     cyclobenzaprine (FLEXERIL) 10 mg tablet Take 1 tablet (10 mg total) by mouth 3 (three) times a day as needed for muscle spasms    famotidine (PEPCID) 20 mg tablet Take 20 mg by mouth 2 (two) times a day    ferrous sulfate 325 (65 Fe) mg tablet Take 65 mg by mouth    gabapentin (NEURONTIN) 600 MG tablet     hydrALAZINE (APRESOLINE) 50 mg tablet Take 50 mg by mouth Three times a day    ibuprofen (MOTRIN) 600 mg tablet Take 600 mg by mouth 3 (three) times a day   latanoprost (XALATAN) 0 005 % ophthalmic solution Administer 1 drop to both eyes daily      losartan (COZAAR) 50 mg tablet Take one tablet by mouth once a day    NIFEdipine ER (ADALAT CC) 60 MG 24 hr tablet TAKE TWO TABLETS BY MOUTH EVERY DAY    nitroglycerin (NITROSTAT) 0 4 mg SL tablet DISSOLVE 1 TABLET UNDER TONGUE EVERY 5 MINUTES FOR UP TO 3 DOSES AS NEEDED FOR CHEST PAIN, CALL 911 IF PAIN PERSISTS OR UNRELIEVED    timolol (BETIMOL) 0 5 % ophthalmic solution Administer 1 drop to both eyes daily  No current facility-administered medications on file prior to visit  Allergies   Allergen Reactions    Acetaminophen Swelling    Aspirin Swelling    Lisinopril Swelling       Physical Exam:    /86   Pulse 66   Resp 18   Ht 5' 11" (1 803 m)   Wt 86 6 kg (191 lb)   BMI 26 64 kg/m²     Constitutional:normal, well developed, well nourished, alert, in no distress and non-toxic and no overt pain behavior    Eyes:anicteric  HEENT:grossly intact  Neck:supple, symmetric, trachea midline and no masses   Pulmonary:even and unlabored  Cardiovascular:No edema or pitting edema present  Skin:Normal without rashes or lesions and well hydrated  Psychiatric:Mood and affect appropriate  Neurologic:Cranial Nerves II-XII grossly intact  Musculoskeletal:normal     Lumbar Spine Exam    Appearance:  Normal lordosis  Palpation/Tenderness:  left lumbar paraspinal tenderness  right lumbar paraspinal tenderness  left sacroiliac joint tenderness  right sacroiliac joint tenderness  Sensory:  no sensory deficits noted except: Decreased sensation to light touch over left L5 dermatome  Range of Motion:  Flexion:  Minimally limited  with pain  Extension:  Moderately limited  with pain  Lateral Flexion - Left:  Moderately limited  with pain  Lateral Flexion - Right:  Minimally limited  without pain  Rotation - Left:  Moderately limited  with pain  Rotation - Right:  Minimally limited  with pain  Motor Strength:  Left hip flexion:  5/5  Left hip extension:  5/5  Right hip flexion:  5/5  Right hip extension:  5/5  Left knee flexion:  5/5  Left knee extension: 5/5  Right knee flexion:  5/5  Right knee extension:  5/5  Left foot dorsiflexion:  4/5  Left foot plantar flexion:  4/5  Right foot dorsiflexion:  5/5  Right foot plantar flexion:  5/5  Reflexes:  Left Patellar:  2+   Right Patellar:  2+   Left Achilles:  2+   Right Achilles:  2+   Special Tests:  Left Straight Leg Test:  positive  Right Straight Leg Test:  negative      Imaging  MRI LUMBAR SPINE WO CONTRAST2/10/2020  [facility]  Result Impression   Impression:     1  Small left foraminal herniation L5-S1 resulting in mild to moderate neural  foraminal narrowing  Disc material contacts the foraminal left L5 nerve root                     Workstation:[id number]   Result Narrative   History: Left hip pain    Procedure: MRI of the lumbar spine was obtained with the following sequences:  Sagittal T1, sagittal T2, sagittal STIR and axial T2 weighted images  Comparison: None    Findings: For the purposes of this dictation, the lumbar vertebrae are labeled  from a caudal to cranial direction, the first vertebra with lumbar morphology is  labeled as L5  Conus and lower thoracic cord: The conus medullaris is normal in morphology,  terminating at L2  Marrow and Alignment: There are Modic endplate degenerative changes at L3, L2,  and L1  No pathologic edema within the marrow or within the ligaments  L1-L2: No evidence of disc herniation, central canal, neural foraminal stenosis  L2-L3 : No evidence of disc herniation, central canal, or neural foraminal  stenosis  L3-L4: No evidence of disc herniation, central canal, or neural foraminal  stenosis  L4-L5: Minimal disc bulge with facet hypertrophy  Mild central canal stenosis  and mild bilateral neural foraminal stenosis  L5-S1: Disc bulge eccentric toward the left side  Small left foraminal  herniation  Disc material contacts the foraminal left L5 nerve root sleeve  Suggest clinical correlation     Other Result Information   Interface, Rad Results In - 02/10/2020  8:49 AM EST  History: Left hip pain    Procedure: MRI of the lumbar spine was obtained with the following sequences:  Sagittal T1, sagittal T2, sagittal STIR and axial T2 weighted images  Comparison: None    Findings: For the purposes of this dictation, the lumbar vertebrae are labeled  from a caudal to cranial direction, the first vertebra with lumbar morphology is  labeled as L5  Conus and lower thoracic cord: The conus medullaris is normal in morphology,  terminating at L2  Marrow and Alignment: There are Modic endplate degenerative changes at L3, L2,  and L1  No pathologic edema within the marrow or within the ligaments  L1-L2: No evidence of disc herniation, central canal, neural foraminal stenosis  L2-L3 : No evidence of disc herniation, central canal, or neural foraminal  stenosis  L3-L4: No evidence of disc herniation, central canal, or neural foraminal  stenosis  L4-L5: Minimal disc bulge with facet hypertrophy  Mild central canal stenosis  and mild bilateral neural foraminal stenosis  L5-S1: Disc bulge eccentric toward the left side  Small left foraminal  herniation  Disc material contacts the foraminal left L5 nerve root sleeve  Suggest clinical correlation  IMPRESSION:  Impression:     1  Small left foraminal herniation L5-S1 resulting in mild to moderate neural  foraminal narrowing  Disc material contacts the foraminal left L5 nerve root  [place] Dyer

## 2021-06-02 NOTE — TELEPHONE ENCOUNTER
S/w pt and she is requesting to do MBB instead of epidural stating per previous epidurals didn't last long  Please advise if can swtich to MBB   Thanks

## 2021-06-03 ENCOUNTER — TELEPHONE (OUTPATIENT)
Dept: PAIN MEDICINE | Facility: CLINIC | Age: 61
End: 2021-06-03

## 2021-06-03 DIAGNOSIS — M47.816 LUMBAR FACET ARTHROPATHY: Primary | ICD-10-CM

## 2021-06-03 NOTE — TELEPHONE ENCOUNTER
oK TO change  I changed order to left L3-4, L4-5, and L5-S1 MBB#1  She was having mostly left sided symptoms in the office

## 2021-06-03 NOTE — TELEPHONE ENCOUNTER
Patient is requesting to schedule her next procedure   Please advise, eugene    Call back# [phone]

## 2021-06-03 NOTE — TELEPHONE ENCOUNTER
S/w pt and scheduled Left L3-S1 MBB #1 for 6/15/21 9 am  Gave pre procedure instructions, masking/ policy

## 2021-06-28 NOTE — TELEPHONE ENCOUNTER
S/w pt and advised MBB denied as no recent PT  Pt is willing to try PT, please place order  Pt would like to go to [facility] for PT so mail script to pt  Pt is currently in hospital with infection so will try PT once she has recovered from that

## 2021-06-28 NOTE — TELEPHONE ENCOUNTER
[facility] has not completed 6 wks conservative tx w/in last 6 months  Can do peer to peer but must be done by today @ 4 pm by calling [phone] ref # [id number]

## 2021-06-28 NOTE — TELEPHONE ENCOUNTER
Injection declined  Will need to start PT  We can ask if she's had PT through [facility] just to double check  If not responding due to significant pain, can document and reorder procedure  She would likely benefit from aqua therapy if she is close to the facility

## 2021-08-03 RX ORDER — MELOXICAM 15 MG/1
TABLET ORAL
COMMUNITY
End: 2021-08-17 | Stop reason: SINTOL

## 2021-08-03 RX ORDER — PSEUDOEPHEDRINE HCL 30 MG
100 TABLET ORAL 2 TIMES DAILY
COMMUNITY
Start: 2021-06-29 | End: 2022-04-26

## 2021-08-03 RX ORDER — METHYLPREDNISOLONE 4 MG/1
TABLET ORAL
COMMUNITY
Start: 2021-06-19 | End: 2022-02-21 | Stop reason: ALTCHOICE

## 2021-08-03 RX ORDER — CEPHALEXIN 500 MG/1
CAPSULE ORAL
COMMUNITY
End: 2022-02-21 | Stop reason: ALTCHOICE

## 2021-08-03 RX ORDER — CYCLOBENZAPRINE HCL 5 MG
TABLET ORAL
COMMUNITY
Start: 2021-06-19 | End: 2022-02-21 | Stop reason: ALTCHOICE

## 2021-08-03 RX ORDER — MAGNESIUM OXIDE 400 MG/1
400 TABLET ORAL DAILY
COMMUNITY
Start: 2021-06-30 | End: 2022-04-26

## 2021-08-03 RX ORDER — TIMOLOL MALEATE 5 MG/ML
SOLUTION/ DROPS OPHTHALMIC
COMMUNITY
Start: 2021-06-07 | End: 2022-04-26

## 2021-08-03 RX ORDER — OMEPRAZOLE 20 MG/1
CAPSULE, DELAYED RELEASE ORAL
COMMUNITY

## 2021-08-03 RX ORDER — FOLIC ACID 1 MG/1
TABLET ORAL
COMMUNITY
Start: 2021-06-07

## 2021-08-04 ENCOUNTER — OFFICE VISIT (OUTPATIENT)
Dept: GASTROENTEROLOGY | Facility: CLINIC | Age: 61
End: 2021-08-04
Payer: COMMERCIAL

## 2021-08-04 VITALS
HEIGHT: 71 IN | HEART RATE: 76 BPM | SYSTOLIC BLOOD PRESSURE: 100 MMHG | DIASTOLIC BLOOD PRESSURE: 60 MMHG | WEIGHT: 182.6 LBS | BODY MASS INDEX: 25.56 KG/M2

## 2021-08-04 DIAGNOSIS — R13.10 DYSPHAGIA, UNSPECIFIED TYPE: Primary | ICD-10-CM

## 2021-08-04 PROCEDURE — 99203 OFFICE O/P NEW LOW 30 MIN: CPT | Performed by: PHYSICIAN ASSISTANT

## 2021-08-04 NOTE — PATIENT INSTRUCTIONS
Dysphagia   WHAT YOU NEED TO KNOW:   Dysphagia is trouble swallowing  It occurs when you have trouble moving food or liquid from your mouth to your esophagus or down to your stomach  It may occur when you eat, drink, or any time you try to swallow  DISCHARGE INSTRUCTIONS:   Return to the emergency department if:   · You choke on your own saliva  · You have chest pain  · You have shortness of breath  · You cannot eat or drink liquids at all  Contact your healthcare provider if:   · You lose weight without trying  · Your signs and symptoms get worse, or you have new signs or symptoms  · You have signs or symptoms of dehydration, such as increased thirst, dark yellow urine, or little or no urine  · You get colds often  · You have questions or concerns about your condition or care  Nutrition:  You may need to change the texture of the foods you eat to help reduce choking problems  Your healthcare provider may show you how to thicken liquids or soften foods to make them easier to swallow  Follow up with your healthcare provider as directed:  Write down your questions so you remember to ask them during your visits  © Copyright [employer] 2021 Information is for End User's use only and may not be sold, redistributed or otherwise used for commercial purposes  All illustrations and images included in CareNotes® are the copyrighted property of A D A M , Inc  or [facility] Shayla Morales   The above information is an  only  It is not intended as medical advice for individual conditions or treatments  Talk to your doctor, nurse or pharmacist before following any medical regimen to see if it is safe and effective for you

## 2021-08-04 NOTE — PROGRESS NOTES
[facility] - Outpatient Consultation  Charlette Dupree 57 y o  female MRN: [id number]  Encounter: [id number]          ASSESSMENT AND PLAN:      1  Dysphagia, unspecified type  -Will plan EGD with dilation and biopsy   -If EGD is negative will plan esophageal manometry testing   ______________________________________________________________________    HPI:   41-year-old female presents to the office today with dysphagia  Patient reports dysphagia in the upper part of the esophagus  Patient does suffer from [address] that has been progressive  Patient denies any heartburn, nausea, vomiting  Patient denies any abdominal pain  Patient denies any melena or rectal bleeding  Patient's last EGD and colonoscopy was 3 years ago  Patient reports 25 lb weight loss over the past year  Patient reports a poor appetite  REVIEW OF SYSTEMS:    CONSTITUTIONAL: Denies any fever, chills, rigors, and weight loss  HEENT: No earache or tinnitus  Denies hearing loss or visual disturbances  CARDIOVASCULAR: No chest pain or palpitations  RESPIRATORY: Denies any cough, hemoptysis, shortness of breath or dyspnea on exertion  GASTROINTESTINAL: As noted in the History of Present Illness  GENITOURINARY: No problems with urination  Denies any hematuria or dysuria  NEUROLOGIC: No dizziness or vertigo, denies headaches  MUSCULOSKELETAL: Denies any muscle or joint pain  SKIN: Denies skin rashes or itching  ENDOCRINE: Denies excessive thirst  Denies intolerance to heat or cold  PSYCHOSOCIAL: Denies depression or anxiety  Denies any recent memory loss  Historical Information   Past Medical History:   Diagnosis Date    Glaucoma     Hypertension     Migraine     Night muscle spasms     Stroke [facility])     2 strokes in past, 2011 and 2013     Past Surgical History:   Procedure Laterality Date    ESOPHAGOGASTRODUODENOSCOPY N/A 8/9/2016    Procedure: ESOPHAGOGASTRODUODENOSCOPY (EGD);   Surgeon: Lisbet Scott MD;  [facility]; Service:     HIP ARTHROPLASTY Bilateral      Social History   Social History     Substance and Sexual Activity   Alcohol Use Yes     Social History     Substance and Sexual Activity   Drug Use No     Social History     Tobacco Use   Smoking Status Former Smoker    Packs/day: 0 25    Years: 10 00    Pack years: 2 50    Types: Cigarettes    Quit date: 2021    Years since quittin 5   Smokeless Tobacco Never Used   Tobacco Comment    refused     History reviewed  No pertinent family history  Meds/Allergies       Current Outpatient Medications:     amLODIPine (NORVASC) 10 mg tablet    atorvastatin (LIPITOR) 40 mg tablet    Aubagio 14 MG TABS    cephalexin (KEFLEX) 500 mg capsule    Cholecalciferol 125 MCG (5000 UT) capsule    cyclobenzaprine (FLEXERIL) 10 mg tablet    cyclobenzaprine (FLEXERIL) 5 mg tablet    Docusate Sodium (DSS) 100 MG CAPS    famotidine (PEPCID) 20 mg tablet    ferrous sulfate 325 (65 Fe) mg tablet    folic acid (FOLVITE) 1 mg tablet    gabapentin (NEURONTIN) 600 MG tablet    hydrALAZINE (APRESOLINE) 50 mg tablet    ibuprofen (MOTRIN) 600 mg tablet    latanoprost (XALATAN) 0 005 % ophthalmic solution    losartan (COZAAR) 50 mg tablet    magnesium oxide (MAG-OX) 400 mg tablet    meloxicam (MOBIC) 15 mg tablet    methylPREDNISolone 4 MG tablet therapy pack    metoprolol tartrate (LOPRESSOR) 25 mg tablet    NIFEdipine ER (ADALAT CC) 60 MG 24 hr tablet    nitroglycerin (NITROSTAT) 0 4 mg SL tablet    omeprazole (PriLOSEC) 20 mg delayed release capsule    timolol (BETIMOL) 0 5 % ophthalmic solution    timolol (TIMOPTIC) 0 5 % ophthalmic solution    Allergies   Allergen Reactions    Acetaminophen Swelling    Aspirin Swelling    Lisinopril Swelling           Objective     Blood pressure 100/60, pulse 76, height 5' 11" (1 803 m), weight 82 8 kg (182 lb 9 6 oz)  Body mass index is 25 47 kg/m²          PHYSICAL EXAM:      General Appearance:   Alert, cooperative, no distress   HEENT:   Normocephalic, atraumatic, anicteric      Neck:  Supple, symmetrical, trachea midline   Lungs:   Clear to auscultation bilaterally; no rales, rhonchi or wheezing; respirations unlabored    Heart[de-identified]   Regular rate and rhythm; no murmur, rub, or gallop  Abdomen:   Soft, non-tender, non-distended; normal bowel sounds; no masses, no organomegaly    Genitalia:   Deferred    Rectal:   Deferred    Extremities:  No cyanosis, clubbing or edema    Pulses:  2+ and symmetric    Skin:  No jaundice, rashes, or lesions    Lymph nodes:  No palpable cervical lymphadenopathy        Lab Results:   No visits with results within 1 Day(s) from this visit  Latest known visit with results is:   Orders Only on 03/18/2021   Component Date Value    HEP C AB 03/18/2021 Negative          Radiology Results:   No results found

## 2021-08-04 NOTE — LETTER
August 4, 2021     Meño Bledsoe, [address] [place] [id number]    Patient: Irina Mills   YOB: 1960   Date of Visit: 8/4/2021       Dear Dr Goyal Devoid: Thank you for referring Irina Mills to me for evaluation  Below are my notes for this consultation  If you have questions, please do not hesitate to call me  I look forward to following your patient along with you  Sincerely,        Ashwini Zhao PA-C        CC: No Recipients  Kenzie Sorenson  8/4/2021 10:19 AM  Sign when Signing Visit  [facility] - Outpatient Consultation  Irina Mills 57 y o  female MRN: [id number]  Encounter: [id number]          ASSESSMENT AND PLAN:      1  Dysphagia, unspecified type  -Will plan EGD with dilation and biopsy   -If EGD is negative will plan esophageal manometry testing   ______________________________________________________________________    HPI:   59-year-old female presents to the office today with dysphagia  Patient reports dysphagia in the upper part of the esophagus  Patient does suffer from [address] that has been progressive  Patient denies any heartburn, nausea, vomiting  Patient denies any abdominal pain  Patient denies any melena or rectal bleeding  Patient's last EGD and colonoscopy was 3 years ago  Patient reports 25 lb weight loss over the past year  Patient reports a poor appetite  REVIEW OF SYSTEMS:    CONSTITUTIONAL: Denies any fever, chills, rigors, and weight loss  HEENT: No earache or tinnitus  Denies hearing loss or visual disturbances  CARDIOVASCULAR: No chest pain or palpitations  RESPIRATORY: Denies any cough, hemoptysis, shortness of breath or dyspnea on exertion  GASTROINTESTINAL: As noted in the History of Present Illness  GENITOURINARY: No problems with urination  Denies any hematuria or dysuria  NEUROLOGIC: No dizziness or vertigo, denies headaches  MUSCULOSKELETAL: Denies any muscle or joint pain  SKIN: Denies skin rashes or itching  ENDOCRINE: Denies excessive thirst  Denies intolerance to heat or cold  PSYCHOSOCIAL: Denies depression or anxiety  Denies any recent memory loss  Historical Information   Past Medical History:   Diagnosis Date    Glaucoma     Hypertension     Migraine     Night muscle spasms     Stroke [facility])     2 strokes in past,  and      Past Surgical History:   Procedure Laterality Date    ESOPHAGOGASTRODUODENOSCOPY N/A 2016    Procedure: ESOPHAGOGASTRODUODENOSCOPY (EGD); Surgeon: Paloma Aguirre MD;  [facility]: [facility]; Service:     HIP ARTHROPLASTY Bilateral      Social History   Social History     Substance and Sexual Activity   Alcohol Use Yes     Social History     Substance and Sexual Activity   Drug Use No     Social History     Tobacco Use   Smoking Status Former Smoker    Packs/day: 0 25    Years: 10 00    Pack years: 2 50    Types: Cigarettes    Quit date: 2021    Years since quittin 5   Smokeless Tobacco Never Used   Tobacco Comment    refused     History reviewed  No pertinent family history      Meds/Allergies       Current Outpatient Medications:     amLODIPine (NORVASC) 10 mg tablet    atorvastatin (LIPITOR) 40 mg tablet    Aubagio 14 MG TABS    cephalexin (KEFLEX) 500 mg capsule    Cholecalciferol 125 MCG (5000 UT) capsule    cyclobenzaprine (FLEXERIL) 10 mg tablet    cyclobenzaprine (FLEXERIL) 5 mg tablet    Docusate Sodium (DSS) 100 MG CAPS    famotidine (PEPCID) 20 mg tablet    ferrous sulfate 325 (65 Fe) mg tablet    folic acid (FOLVITE) 1 mg tablet    gabapentin (NEURONTIN) 600 MG tablet    hydrALAZINE (APRESOLINE) 50 mg tablet    ibuprofen (MOTRIN) 600 mg tablet    latanoprost (XALATAN) 0 005 % ophthalmic solution    losartan (COZAAR) 50 mg tablet    magnesium oxide (MAG-OX) 400 mg tablet    meloxicam (MOBIC) 15 mg tablet    methylPREDNISolone 4 MG tablet therapy pack    metoprolol tartrate (LOPRESSOR) 25 mg tablet    NIFEdipine ER (ADALAT CC) 60 MG 24 hr tablet    nitroglycerin (NITROSTAT) 0 4 mg SL tablet    omeprazole (PriLOSEC) 20 mg delayed release capsule    timolol (BETIMOL) 0 5 % ophthalmic solution    timolol (TIMOPTIC) 0 5 % ophthalmic solution    Allergies   Allergen Reactions    Acetaminophen Swelling    Aspirin Swelling    Lisinopril Swelling           Objective     Blood pressure 100/60, pulse 76, height 5' 11" (1 803 m), weight 82 8 kg (182 lb 9 6 oz)  Body mass index is 25 47 kg/m²  PHYSICAL EXAM:      General Appearance:   Alert, cooperative, no distress   HEENT:   Normocephalic, atraumatic, anicteric      Neck:  Supple, symmetrical, trachea midline   Lungs:   Clear to auscultation bilaterally; no rales, rhonchi or wheezing; respirations unlabored    Heart[de-identified]   Regular rate and rhythm; no murmur, rub, or gallop  Abdomen:   Soft, non-tender, non-distended; normal bowel sounds; no masses, no organomegaly    Genitalia:   Deferred    Rectal:   Deferred    Extremities:  No cyanosis, clubbing or edema    Pulses:  2+ and symmetric    Skin:  No jaundice, rashes, or lesions    Lymph nodes:  No palpable cervical lymphadenopathy        Lab Results:   No visits with results within 1 Day(s) from this visit  Latest known visit with results is:   Orders Only on 03/18/2021   Component Date Value    HEP C AB 03/18/2021 Negative          Radiology Results:   No results found

## 2021-08-10 ENCOUNTER — TELEPHONE (OUTPATIENT)
Dept: NEPHROLOGY | Facility: CLINIC | Age: 61
End: 2021-08-10

## 2021-08-10 NOTE — TELEPHONE ENCOUNTER
I called [facility] @ [phone] and spoke to [place] ()  to check eligible for the patient and as of 8/10/2021 the patient has current active coverage as of 5/1/2017  According to [place] () this patient plan requires a [facility] referral from PCP on file Dr Deborah Adan in order to be seen by a specialist  The reference number for this call is: [id number]    Mode Keith,

## 2021-08-16 ENCOUNTER — TELEPHONE (OUTPATIENT)
Dept: NEPHROLOGY | Facility: CLINIC | Age: 61
End: 2021-08-16

## 2021-08-17 ENCOUNTER — CONSULT (OUTPATIENT)
Dept: NEPHROLOGY | Facility: CLINIC | Age: 61
End: 2021-08-17
Payer: COMMERCIAL

## 2021-08-17 VITALS
TEMPERATURE: 96.5 F | WEIGHT: 182 LBS | RESPIRATION RATE: 16 BRPM | HEIGHT: 71 IN | BODY MASS INDEX: 25.48 KG/M2 | SYSTOLIC BLOOD PRESSURE: 110 MMHG | DIASTOLIC BLOOD PRESSURE: 70 MMHG | HEART RATE: 68 BPM

## 2021-08-17 DIAGNOSIS — E83.9 CHRONIC KIDNEY DISEASE-MINERAL AND BONE DISORDER: ICD-10-CM

## 2021-08-17 DIAGNOSIS — N18.9 CHRONIC KIDNEY DISEASE-MINERAL AND BONE DISORDER: ICD-10-CM

## 2021-08-17 DIAGNOSIS — M89.9 CHRONIC KIDNEY DISEASE-MINERAL AND BONE DISORDER: ICD-10-CM

## 2021-08-17 DIAGNOSIS — N17.9 AKI (ACUTE KIDNEY INJURY) (HCC): ICD-10-CM

## 2021-08-17 DIAGNOSIS — I10 ESSENTIAL HYPERTENSION: Primary | Chronic | ICD-10-CM

## 2021-08-17 DIAGNOSIS — M54.16 LUMBAR RADICULOPATHY: ICD-10-CM

## 2021-08-17 DIAGNOSIS — N18.30 STAGE 3 CHRONIC KIDNEY DISEASE (HCC): ICD-10-CM

## 2021-08-17 DIAGNOSIS — G35 MULTIPLE SCLEROSIS (HCC): ICD-10-CM

## 2021-08-17 PROCEDURE — 99244 OFF/OP CNSLTJ NEW/EST MOD 40: CPT | Performed by: INTERNAL MEDICINE

## 2021-08-17 NOTE — PATIENT INSTRUCTIONS
Chronic Kidney Disease   AMBULATORY CARE:   Chronic kidney disease (CKD)  is the gradual and permanent loss of kidney function  Normally, the kidneys remove fluid, chemicals, and waste from your blood  These wastes are turned into urine by your kidneys  CKD may worsen over time and lead to kidney failure  Common signs and symptoms include the following:   · Changes in how often you need to urinate    · Swelling in your arms, legs, or feet    · Shortness of breath    · Fatigue or weakness    · Bad or bitter taste in your mouth    · Nausea, vomiting, or loss of appetite    Call your local emergency number (911 in the [facility]) if:   · You have a seizure  · You have shortness of breath  Seek care immediately if:   · You are confused and very drowsy  Call your doctor or nephrologist if:   · You suddenly gain or lose more weight than your healthcare provider has told you is okay  · You have itchy skin or a rash  · You urinate more or less than you normally do  · You have blood in your urine  · You have nausea and are vomiting  · You have fatigue or muscle weakness  · You have hiccups that will not stop  · You have questions or concerns about your condition or care  How CKD is diagnosed:  CKD has 5 stages  Your healthcare provider will use results from the following tests to find the stage of CKD you have:  · Blood and urine tests  show how well your kidneys are working  They may also show the cause of your CKD  · Ultrasound, CT scan, or MRI  pictures may be used to check your kidneys  You may be given contrast liquid to help your kidneys show up better in the pictures  Tell the healthcare provider if you have ever had an allergic reaction to contrast liquid  Do not enter the MRI room with anything metal  Metal can cause serious injury  Tell the healthcare provider if you have any metal in or on your body      · A biopsy  is a procedure to remove a small piece of tissue from your kidney  It is done to find the cause of your CKD  Treatment  can help control signs and symptoms, and prevent a worse stage of CKD  Your care team may include specialists, such as a dietitian or a heart specialist  This depends on the stage of your CKD and if you have other health conditions to manage  Healthcare providers will work with you to create a plan based on your decisions for treatment  Your treatment plan may include any of the following:  · Medicines  may be given to decrease your blood pressure and get rid of extra fluid  You may also receive medicine to manage health conditions that may occur with CKD, such as anemia, diabetes, and heart disease  · Dialysis  is a treatment to remove chemicals and waste from your blood when your kidneys can no longer do this  · Surgery  may be needed to create an arteriovenous fistula (AVF) in your arm or insert a catheter into your abdomen  This is done so you can receive dialysis  · A kidney transplant  may be done if your CKD becomes severe  What you can do to manage CKD: Management may include making some lifestyle changes  Tell your healthcare provider if you have any concerns about being able to make changes  He or she can help you find solutions, including working with specialists  Ask for help creating a plan to break large goals into smaller steps  Your plan may include any of the following:  · Manage other health conditions  Your healthcare provider will work with you to make a care plan that meets your needs  You will be checked regularly for heart disease or other conditions that can make CKD worse, such as diabetes  Your blood pressure will be closely monitored  You will also get a target blood pressure and help making a plan to reach your target  This may include taking your blood pressure at home  · Maintain a healthy weight  Your weight and body mass index (BMI) will be checked regularly   BMI helps find if your weight is healthy for your height  Your healthcare provider will use other tests to check your muscle and protein levels  Extra weight can strain your kidneys  A low weight or low muscle mass can make you feel more tired  You may have trouble doing your daily activities  Ask your provider what a healthy weight is for you  He or she can help you create a plan to lose or gain weight safely, if needed  The plan may include keeping a food diary  This is a list of foods and liquids you have each day  Your provider will use the diary to help you make changes, if needed  Changes are based on your health and any other conditions you have, such as diabetes  · Create an exercise plan  Regular exercise can help you manage CKD, high blood pressure, and diabetes  Exercise also helps control weight  Your provider can help you create exercise goals and a plan to reach those goals  For example, your goal may be to exercise for 30 minutes in a day  Your plan can include breaking exercise into 10 minute sessions, 3 times during the day  · Create a healthy eating plan  Your provider may tell you to eat food low in potassium, phosphorus, or protein  Your provider may also recommend vitamin or mineral supplements  Do not take any supplements without talking to your provider  A dietitian can help you plan meals if needed  Ask how much liquid to drink each day and which liquids are best for you  · Limit sodium (salt) as directed  You may need to limit sodium to less than 2,300 milligrams (mg) each day  Ask your dietitian or healthcare provider how much sodium you can have each day  The amount depends on your stage of kidney disease  Table salt, canned foods, soups, salted snacks, and processed meats, like deli meats and sausage, are high in sodium  Your provider or a dietitian can show you how to read food labels for sodium  · Limit alcohol as directed  Alcohol can cause fluid retention and can affect your kidneys   Ask how much alcohol is safe for you  A drink of alcohol is 12 ounces of beer, 5 ounces of wine, or 1½ ounces of liquor  · Do not smoke  Nicotine and other chemicals in cigarettes and cigars can cause kidney damage  Ask your provider for information if you currently smoke and need help to quit  E-cigarettes or smokeless tobacco still contain nicotine  Talk to your provider before you use these products  · Ask about over-the-counter medicines  Medicines such as NSAIDs and laxatives may harm your kidneys  Some cough and cold medicines can raise your blood pressure  Always ask if a medicine is safe before you take it  · Ask about vaccines you may need  CKD can increase your risk for infections such as pneumonia, influenza, and hepatitis  Vaccines lower your risk for infection  Your healthcare provider will tell you which vaccines you need and when to get them  Follow up with your doctor or nephrologist as directed: You will need to return for tests to monitor your kidney and nerve function, and your parathyroid hormone level  Your medicines may be changed, based on certain test results  Write down your questions so you remember to ask them during your visits  © Copyright [employer] 2021 Information is for End User's use only and may not be sold, redistributed or otherwise used for commercial purposes  All illustrations and images included in CareNotes® are the copyrighted property of [employer]  or Dhaval Hernandez  The above information is an  only  It is not intended as medical advice for individual conditions or treatments  Talk to your doctor, nurse or pharmacist before following any medical regimen to see if it is safe and effective for you

## 2021-08-17 NOTE — ASSESSMENT & PLAN NOTE
I think patient does have acute kidney injury on top of CKD  Likely because of urosepsis along with nonsteroidal pain killer    Advised to stop nonsteroidal pain killer and discuss with pain management about sick for pain killer

## 2021-08-17 NOTE — ASSESSMENT & PLAN NOTE
Chronic in nature being managed by pain specialist   Lynne Gonzalez to avoid and stop ibuprofen as well as meloxicam   She will discussed with pain management about  safer pain killer

## 2021-08-17 NOTE — PROGRESS NOTES
NEPHROLOGY OFFICE CONSULT  Charlette Dupree 57 y o  female MRN: [id number]    Encounter: [id number] 8/17/2021    REASON FOR VISIT: Charlette Dupree is a 57 y  o female who was referred by MAIKOL Jack for evaluation of Chronic Kidney Disease and Consult    HPI:     Ekta Fernandes in today for evaluation management of renal failure  [de-identified] year woman with history of hypertension and multiple sclerosis who also has a chronic back pain and on nonsteroidal pain killer as part of the management    Patient recently hospitalized in [facility] with urosepsis and was found to acute kidney injury at that time  According the patient she was seen by nephrologist there  She was discharged home with improving kidney function    Other than back pain she is doing well  Denies any urinary complaint     No chest pain no palpitation    Patient does a long history of hypertension which used to run very high but now is much better with medication      REVIEW OF SYSTEMS:    Review of Systems   Constitutional: Negative for activity change and fatigue  HENT: Negative for congestion and ear discharge  Eyes: Negative for photophobia and pain  Respiratory: Positive for shortness of breath  Negative for apnea and choking  Cardiovascular: Negative for chest pain, palpitations and leg swelling  Gastrointestinal: Negative for abdominal distention, abdominal pain and blood in stool  Endocrine: Negative for heat intolerance and polyphagia  Genitourinary: Negative for difficulty urinating, flank pain and urgency  Musculoskeletal: Positive for arthralgias and back pain  Negative for neck pain and neck stiffness  Skin: Negative for color change and wound  Allergic/Immunologic: Negative for food allergies and immunocompromised state  Neurological: Positive for headaches  Negative for seizures and facial asymmetry  Hematological: Negative for adenopathy  Does not bruise/bleed easily     Psychiatric/Behavioral: Negative for self-injury and suicidal ideas  PAST MEDICAL HISTORY:  Past Medical History:   Diagnosis Date    Chronic kidney disease     Glaucoma     Hypertension     Migraine     [facility] ([facility] [address] )     Night muscle spasms     Stroke ([facility] [address] )     2 strokes in past,  and        PAST SURGICAL HISTORY:  Past Surgical History:   Procedure Laterality Date    ESOPHAGOGASTRODUODENOSCOPY N/A 2016    Procedure: ESOPHAGOGASTRODUODENOSCOPY (EGD); Surgeon: Vince Mix MD;  [facility];   Service:     HIP ARTHROPLASTY Bilateral        SOCIAL HISTORY:  Social History     Substance and Sexual Activity   Alcohol Use Yes     Social History     Substance and Sexual Activity   Drug Use No     Social History     Tobacco Use   Smoking Status Former Smoker    Packs/day: 0 25    Years: 10 00    Pack years: 2 50    Types: Cigarettes    Quit date: 2021    Years since quittin 6   Smokeless Tobacco Never Used   Tobacco Comment    refused       FAMILY HISTORY:  Family History   Problem Relation Age of Onset    No Known Problems Mother     No Known Problems Father        MEDICATIONS:    Current Outpatient Medications:     amLODIPine (NORVASC) 10 mg tablet, TAKE ONE TABLET BY MOUTH EVERY MORNING, Disp: , Rfl:     atorvastatin (LIPITOR) 40 mg tablet, , Disp: , Rfl:     Aubagio 14 MG TABS, , Disp: , Rfl:     Cholecalciferol 125 MCG (5000 UT) capsule, 5,000 Units, Disp: , Rfl:     cyclobenzaprine (FLEXERIL) 10 mg tablet, Take 1 tablet (10 mg total) by mouth 3 (three) times a day as needed for muscle spasms, Disp: 60 tablet, Rfl: 0    Docusate Sodium (DSS) 100 MG CAPS, Take 100 mg by mouth 2 (two) times a day, Disp: , Rfl:     famotidine (PEPCID) 20 mg tablet, Take 20 mg by mouth 2 (two) times a day, Disp: , Rfl:     ferrous sulfate 325 (65 Fe) mg tablet, Take 65 mg by mouth, Disp: , Rfl:     folic acid (FOLVITE) 1 mg tablet, , Disp: , Rfl:     gabapentin (NEURONTIN) 600 MG tablet, , Disp: , Rfl:     hydrALAZINE (APRESOLINE) 50 mg tablet, Take 50 mg by mouth Three times a day, Disp: , Rfl:     latanoprost (XALATAN) 0 005 % ophthalmic solution, Administer 1 drop to both eyes daily  , Disp: , Rfl:     losartan (COZAAR) 50 mg tablet, Take one tablet by mouth once a day, Disp: , Rfl:     magnesium oxide (MAG-OX) 400 mg tablet, Take 400 mg by mouth daily, Disp: , Rfl:     metoprolol tartrate (LOPRESSOR) 25 mg tablet, metoprolol tartrate 25 mg tablet, Disp: , Rfl:     NIFEdipine ER (ADALAT CC) 60 MG 24 hr tablet, TAKE TWO TABLETS BY MOUTH EVERY DAY, Disp: , Rfl:     nitroglycerin (NITROSTAT) 0 4 mg SL tablet, DISSOLVE 1 TABLET UNDER TONGUE EVERY 5 MINUTES FOR UP TO 3 DOSES AS NEEDED FOR CHEST PAIN, CALL 911 IF PAIN PERSISTS OR UNRELIEVED, Disp: , Rfl:     omeprazole (PriLOSEC) 20 mg delayed release capsule, omeprazole 20 mg capsule,delayed release, Disp: , Rfl:     timolol (BETIMOL) 0 5 % ophthalmic solution, Administer 1 drop to both eyes daily  , Disp: , Rfl:     cephalexin (KEFLEX) 500 mg capsule, cephalexin 500 mg capsule (Patient not taking: Reported on 8/17/2021), Disp: , Rfl:     cyclobenzaprine (FLEXERIL) 5 mg tablet, , Disp: , Rfl:     ibuprofen (MOTRIN) 600 mg tablet, Take 600 mg by mouth 3 (three) times a day  (Patient not taking: Reported on 8/17/2021), Disp: , Rfl:     methylPREDNISolone 4 MG tablet therapy pack, , Disp: , Rfl:     timolol (TIMOPTIC) 0 5 % ophthalmic solution, , Disp: , Rfl:     PHYSICAL EXAM:  Vitals:    08/17/21 1002   BP: 110/70   BP Location: Right arm   Patient Position: Sitting   Pulse: 68   Resp: 16   Temp: (!) 96 5 °F (35 8 °C)   TempSrc: Temporal   Weight: 82 6 kg (182 lb)   Height: 5' 11" (1 803 m)     Body mass index is 25 38 kg/m²  Physical Exam  Constitutional:       General: She is not in acute distress  Appearance: She is well-developed  HENT:      Head: Normocephalic  Eyes:      General: No scleral icterus       Conjunctiva/sclera: Conjunctivae normal       Pupils: Pupils are equal, round, and reactive to light  Neck:      Vascular: No JVD  Cardiovascular:      Rate and Rhythm: Normal rate and regular rhythm  Heart sounds: Normal heart sounds  Pulmonary:      Effort: Pulmonary effort is normal  No respiratory distress  Breath sounds: Normal breath sounds  No wheezing  Abdominal:      General: There is no distension  Palpations: Abdomen is soft  Tenderness: There is no abdominal tenderness  Musculoskeletal:         General: No swelling  Normal range of motion  Cervical back: Normal range of motion and neck supple  No rigidity  Skin:     General: Skin is warm  Findings: No rash  Neurological:      General: No focal deficit present  Mental Status: She is alert and oriented to person, place, and time  Psychiatric:         Behavior: Behavior normal          LAB RESULTS:  Results for orders placed or performed in visit on 03/18/21   Hepatitis C antibody   Result Value Ref Range    HEP C AB Negative        ASSESSMENT and PLAN:    Stage 3 chronic kidney disease (HCC)  Lab Results   Component Value Date    EGFR 53 0 08/10/2016    EGFR 53 5 08/10/2016    EGFR 55 6 08/08/2016    CREATININE 1 26 08/10/2016    CREATININE 1 25 08/10/2016    CREATININE 1 21 08/08/2016    I think patient does have stage III CKD with baseline GFR in range of 53  Likely etiology is possible hypertensive nephrosclerosis though nonsteroidal pain killer definitely contributing    Pathophysiology kidney disease discussed at length with her  Advised hydration and avoid any nephrotoxic medicine  Asymptomatic and progressive nature also discussed with her    I will repeat blood and urine test   Patient had kidney ultrasound during hospitalization which was normal    JOVANA (acute kidney injury) ([facility] [address] )   I think patient does have acute kidney injury on top of CKD  Likely because of urosepsis along with nonsteroidal pain killer  Advised to stop nonsteroidal pain killer and discuss with pain management about sick for pain killer    Essential hypertension   Blood pressure is very well controlled with present medication I will continue same medication    Multiple sclerosis (HCC)   Seems to be stable and being monitored by neurologist    Lumbar radiculopathy   Chronic in nature being managed by pain specialist   Sabrina Sainz to avoid and stop ibuprofen as well as meloxicam   She will discussed with pain management about  safer pain killer      Everything discussed at length with the patient  I will see her back in 3 months  She will get blood and urine test before that visit  Thank you very much for consultation I will monitor the patient with you          Portions of the record may have been created with voice recognition software  Occasional wrong word or "sound a like" substitutions may have occurred due to the inherent limitations of voice recognition software  Read the chart carefully and recognize, using context, where substitutions have occurred  If you have any questions, please contact the dictating provider

## 2021-08-17 NOTE — ASSESSMENT & PLAN NOTE
Lab Results   Component Value Date    EGFR 53 0 08/10/2016    EGFR 53 5 08/10/2016    EGFR 55 6 08/08/2016    CREATININE 1 26 08/10/2016    CREATININE 1 25 08/10/2016    CREATININE 1 21 08/08/2016    I think patient does have stage III CKD with baseline GFR in range of 53  Likely etiology is possible hypertensive nephrosclerosis though nonsteroidal pain killer definitely contributing    Pathophysiology kidney disease discussed at length with her  Advised hydration and avoid any nephrotoxic medicine    Asymptomatic and progressive nature also discussed with her    I will repeat blood and urine test   Patient had kidney ultrasound during hospitalization which was normal

## 2021-08-23 ENCOUNTER — TELEPHONE (OUTPATIENT)
Dept: GASTROENTEROLOGY | Facility: HOSPITAL | Age: 61
End: 2021-08-23

## 2021-08-24 ENCOUNTER — PROCEDURE VISIT (OUTPATIENT)
Dept: NEUROLOGY | Facility: CLINIC | Age: 61
End: 2021-08-24

## 2021-08-24 ENCOUNTER — HOSPITAL ENCOUNTER (OUTPATIENT)
Dept: GASTROENTEROLOGY | Facility: HOSPITAL | Age: 61
Setting detail: OUTPATIENT SURGERY
Discharge: HOME | End: 2021-08-24
Payer: COMMERCIAL

## 2021-08-24 ENCOUNTER — ANESTHESIA (OUTPATIENT)
Dept: GASTROENTEROLOGY | Facility: HOSPITAL | Age: 61
End: 2021-08-24

## 2021-08-24 ENCOUNTER — ANESTHESIA EVENT (OUTPATIENT)
Dept: GASTROENTEROLOGY | Facility: HOSPITAL | Age: 61
End: 2021-08-24

## 2021-08-24 VITALS
DIASTOLIC BLOOD PRESSURE: 57 MMHG | SYSTOLIC BLOOD PRESSURE: 104 MMHG | HEIGHT: 71 IN | HEART RATE: 71 BPM | TEMPERATURE: 97.5 F | OXYGEN SATURATION: 99 % | WEIGHT: 183.42 LBS | RESPIRATION RATE: 16 BRPM | BODY MASS INDEX: 25.68 KG/M2

## 2021-08-24 DIAGNOSIS — R13.10 DYSPHAGIA, UNSPECIFIED TYPE: ICD-10-CM

## 2021-08-24 DIAGNOSIS — M54.50 LOW BACK PAIN: ICD-10-CM

## 2021-08-24 PROCEDURE — 43248 EGD GUIDE WIRE INSERTION: CPT | Performed by: INTERNAL MEDICINE

## 2021-08-24 RX ORDER — PROPOFOL 10 MG/ML
INJECTION, EMULSION INTRAVENOUS AS NEEDED
Status: DISCONTINUED | OUTPATIENT
Start: 2021-08-24 | End: 2021-08-24

## 2021-08-24 RX ORDER — SODIUM CHLORIDE, SODIUM LACTATE, POTASSIUM CHLORIDE, CALCIUM CHLORIDE 600; 310; 30; 20 MG/100ML; MG/100ML; MG/100ML; MG/100ML
75 INJECTION, SOLUTION INTRAVENOUS CONTINUOUS
Status: DISCONTINUED | OUTPATIENT
Start: 2021-08-24 | End: 2021-08-28 | Stop reason: HOSPADM

## 2021-08-24 RX ADMIN — PROPOFOL 150 MG: 10 INJECTION, EMULSION INTRAVENOUS at 09:02

## 2021-08-24 RX ADMIN — SODIUM CHLORIDE, SODIUM LACTATE, POTASSIUM CHLORIDE, AND CALCIUM CHLORIDE 75 ML/HR: .6; .31; .03; .02 INJECTION, SOLUTION INTRAVENOUS at 08:23

## 2021-08-24 RX ADMIN — PROPOFOL 50 MG: 10 INJECTION, EMULSION INTRAVENOUS at 09:04

## 2021-08-24 RX ADMIN — PROPOFOL 20 MG: 10 INJECTION, EMULSION INTRAVENOUS at 09:07

## 2021-08-24 NOTE — ANESTHESIA PREPROCEDURE EVALUATION
Procedure:  EGD    Relevant Problems   CARDIO   (+) Essential hypertension      /RENAL   (+) JOVANA (acute kidney injury) ([facility] [address] )   (+) Chronic kidney disease-mineral and bone disorder   (+) Stage 3 chronic kidney disease (HCC)      NEURO/PSYCH   (+) Stroke (HCC)      Other   (+) Abdominal pain   (+) Bradycardia   (+) Lumbar radiculopathy   (+) Multiple sclerosis (HCC)   (+) Nausea and/or vomiting   (+) Stroke-like symptoms   (+) Syncope    MS on Aubagio, no recent flairs  Stable deficits - lue weakness and parasthesia     Physical Exam    Airway    Mallampati score: II  TM Distance: >3 FB  Neck ROM: full     Dental   No notable dental hx     Cardiovascular  Rhythm: regular, Rate: normal,     Pulmonary  Breath sounds clear to auscultation,     Other Findings        Anesthesia Plan  ASA Score- 3     Anesthesia Type- IV sedation with anesthesia with ASA Monitors  Additional Monitors:   Airway Plan:           Plan Factors-Exercise tolerance (METS): >4 METS  Chart reviewed  EKG reviewed  Imaging results reviewed  Existing labs reviewed  Patient summary reviewed  Patient is not a current smoker  Induction- intravenous  Postoperative Plan-     Informed Consent- Anesthetic plan and risks discussed with patient  I personally reviewed this patient with the CRNA  Discussed and agreed on the Anesthesia Plan with the CRNA  Mian Espinoza

## 2021-08-24 NOTE — H&P
History and Physical - [facility]  Shyam Estrada 57 y o  female MRN: [id number]      HPI: Shyam Estrada is a 57y o  year old female who presents for evaluation of dysphagia      REVIEW OF SYSTEMS: Per the HPI, and otherwise unremarkable  Historical Information   Past Medical History:   Diagnosis Date    Chronic kidney disease     Glaucoma     Hypertension     Migraine     Multiple sclerosis ([facility] [address] )     Night muscle spasms     Stroke ([facility] [address] )     2 strokes in past,  and      Past Surgical History:   Procedure Laterality Date    ESOPHAGOGASTRODUODENOSCOPY N/A 2016    Procedure: ESOPHAGOGASTRODUODENOSCOPY (EGD); Surgeon: Karen Mcnulty MD;  [facility]: [facility]; Service:     HIP ARTHROPLASTY Bilateral      Social History   Social History     Substance and Sexual Activity   Alcohol Use Yes     Social History     Substance and Sexual Activity   Drug Use No     Social History     Tobacco Use   Smoking Status Former Smoker    Packs/day: 0 25    Years: 10 00    Pack years: 2 50    Types: Cigarettes    Quit date: 2021    Years since quittin 6   Smokeless Tobacco Never Used   Tobacco Comment    1 or 2 a week     Family History   Problem Relation Age of Onset    No Known Problems Mother     No Known Problems Father        Meds/Allergies     (Not in a hospital admission)      Allergies   Allergen Reactions    Acetaminophen Swelling    Aspirin Swelling    Lisinopril Swelling       Objective     Blood pressure 153/89, pulse 74, temperature 97 5 °F (36 4 °C), temperature source Temporal, resp  rate 16, height 5' 11" (1 803 m), weight 83 2 kg (183 lb 6 8 oz), SpO2 100 %  PHYSICAL EXAM    Gen: NAD  CV: RRR  CHEST: Clear  ABD: soft, NT/ND  EXT: no edema      ASSESSMENT/PLAN:  This is a 57y o  year old female here for EGD with possible biopsy and dilation, and she is stable and optimized for her procedure

## 2021-08-24 NOTE — ANESTHESIA POSTPROCEDURE EVALUATION
Post-Op Assessment Note    CV Status:  Stable  Pain Score: 0    Pain management: adequate     Mental Status:  Alert and awake   Hydration Status:  Stable   PONV Controlled:  None   Airway Patency:  Patent and adequate      Post Op Vitals Reviewed: Yes      Staff: Anesthesiologist, CRNA         No complications documented      /68 (08/24/21 0912)    Temp 97 5 °F (36 4 °C) (08/24/21 0912)    Pulse 75 (08/24/21 0912)   Resp 16 (08/24/21 0912)    SpO2 100 % (08/24/21 0912)

## 2021-08-24 NOTE — PROGRESS NOTES
EMG 1 Limb     Date/Time 8/24/2021 3:44 PM     Performed by  Enrico Gibson MD     Authorized by Pro Mc MD              EMG LEFT LOWER EXTREMITY    Patient refused the study after initial stimulation of the left peroneal nerve at the ankle which demonstrated a normal latency with possibly reduced amplitude, however she would not allow a larger stimulation to see if the amplitude would improve      Haley Johnson MD

## 2021-11-01 ENCOUNTER — TELEPHONE (OUTPATIENT)
Dept: PAIN MEDICINE | Facility: CLINIC | Age: 61
End: 2021-11-01

## 2021-11-04 ENCOUNTER — TELEPHONE (OUTPATIENT)
Dept: NEPHROLOGY | Facility: CLINIC | Age: 61
End: 2021-11-04

## 2021-11-19 ENCOUNTER — TELEPHONE (OUTPATIENT)
Dept: NEPHROLOGY | Facility: CLINIC | Age: 61
End: 2021-11-19

## 2021-12-29 ENCOUNTER — TELEPHONE (OUTPATIENT)
Dept: NEPHROLOGY | Facility: CLINIC | Age: 61
End: 2021-12-29

## 2022-02-18 ENCOUNTER — TELEPHONE (OUTPATIENT)
Dept: NEPHROLOGY | Facility: CLINIC | Age: 62
End: 2022-02-18

## 2022-02-18 NOTE — TELEPHONE ENCOUNTER
Pt called and scheduled follow up appt for 04/26 at 320  Pt will go for labs in the beginning of March  Please review labs when done and advise if pt should be seen sooner

## 2022-02-21 ENCOUNTER — OFFICE VISIT (OUTPATIENT)
Dept: PAIN MEDICINE | Facility: CLINIC | Age: 62
End: 2022-02-21
Payer: COMMERCIAL

## 2022-02-21 VITALS — WEIGHT: 195 LBS | BODY MASS INDEX: 27.3 KG/M2 | RESPIRATION RATE: 18 BRPM | HEIGHT: 71 IN

## 2022-02-21 DIAGNOSIS — M47.816 LUMBAR FACET ARTHROPATHY: Primary | ICD-10-CM

## 2022-02-21 DIAGNOSIS — M54.16 LUMBAR RADICULOPATHY: ICD-10-CM

## 2022-02-21 DIAGNOSIS — M54.12 CERVICAL RADICULOPATHY: ICD-10-CM

## 2022-02-21 PROCEDURE — 99214 OFFICE O/P EST MOD 30 MIN: CPT | Performed by: STUDENT IN AN ORGANIZED HEALTH CARE EDUCATION/TRAINING PROGRAM

## 2022-02-21 NOTE — PROGRESS NOTES
Pain Medicine Follow-Up Note    Assessment:  1  Lumbar facet arthropathy    2  Lumbar radiculopathy    3  Cervical radiculopathy        Plan:  Orders Placed This Encounter   Procedures    FL spine and pain procedure     Standing Status:   Future     Standing Expiration Date:   2/21/2026     Order Specific Question:   Reason for Exam:     Answer:   c7-t1 geovanna     Order Specific Question:   Anticoagulant hold needed? Answer:   no    FL spine and pain procedure     Standing Status:   Future     Standing Expiration Date:   2/21/2026     Order Specific Question:   Reason for Exam:     Answer:   left L3-4, L4-5, and L5-S1 MBB #1 - please schedule asap before GEOVANNA     Order Specific Question:   Anticoagulant hold needed? Answer:   NO       No orders of the defined types were placed in this encounter  My impressions and treatment recommendations were discussed in detail with the patient who verbalized understanding and had no further questions  This is a 57year old female with notable degenerative facet disease of the cervical and lumbar spine who returns to our office with escalating left-sided low back pain  She was to undergo left L3-4, L4-5, and L5-S1 MBB months ago, however had to undergo physical therapy 1st   She underwent physical [facility] for greater than 6 weeks [facility]  Unfortunately she did not have improvement in her symptoms  Therefore we will move forward with left L3-4, L4-5, and L5-S1 medial branch block with escalation to radiofrequency ablation of greater than 80% relief  Furthermore, she has escalating neck and cervical radicular pain which was greatly improved with cervical epidural steroid injection with 80% improvement for 2 months  We discussed repeating the injection as well  This will be scheduled for after the MBB      [place] Prescription Drug Monitoring Program report was reviewed and was appropriate     Complete risks and benefits including bleeding, infection, tissue reaction, nerve injury and allergic reaction were discussed  The approach was demonstrated using models and literature was provided  Verbal and written consent was obtained  Discharge instructions were provided  I personally saw and examined the patient and I agree with the above discussed plan of care  History of Present Illness:    Martin Garibay is a 57 y o  female who presents to [facility] for interval re-evaluation of the above stated pain complaints  The patient has a past medical and chronic pain history as outlined in the assessment section  She was last seen on 2021 in regards to lumbar facet arthropathy and was ordered left L3-S1 medial branch block which was declined by insurance  Patient had to undergo physical therapy 1st       Pain is worse in the morning, evening, and night  Pain is constant, sharp, throbbing, numbness, and pins and needles  She underwent C7-T1 GEOVANNA with 80% improvement for 2 months  Is requesting repeat injection  Pain again is primarily int he left side of the low back  Denies significant leg pain  She has to wear back brace constnatly  She recently underwent physical therapy at [facility] for  Over 6 weeks with no improvement  Other than as stated above, the patient denies any interval changes in medications, medical condition, mental condition, symptoms, or allergies since the last office visit  Review of Systems:    Review of Systems   Respiratory: Positive for shortness of breath  Cardiovascular: Negative for chest pain  Gastrointestinal: Negative for constipation, diarrhea, nausea and vomiting  Musculoskeletal: Positive for gait problem  Negative for arthralgias, joint swelling and myalgias  Joint stiffness   Skin: Negative for rash  Neurological: Positive for dizziness  Negative for seizures and weakness     Psychiatric/Behavioral:        Memory loss   All other systems reviewed and are negative  Patient Active Problem List   Diagnosis    Stroke [facility])    Essential hypertension    Abdominal pain    Nausea and/or vomiting    Syncope    Stroke-like symptoms    Bradycardia    Stage 3 chronic kidney disease (HCC)    Chronic kidney disease-mineral and bone disorder    Multiple sclerosis ([facility] )    Lumbar radiculopathy    JOVANA (acute kidney injury) ([facility] )       Past Medical History:   Diagnosis Date    Chronic kidney disease     Glaucoma     Hypertension     Migraine     Multiple sclerosis ([facility] )     Night muscle spasms     Stroke ([place] [address] )     2 strokes in past,  and        Past Surgical History:   Procedure Laterality Date    ESOPHAGOGASTRODUODENOSCOPY N/A 2016    Procedure: ESOPHAGOGASTRODUODENOSCOPY (EGD); Surgeon: Tyree Parra MD;  [facility]; Service:     HIP ARTHROPLASTY Bilateral        Family History   Problem Relation Age of Onset    No Known Problems Mother     No Known Problems Father        Social History     Occupational History    Not on file   Tobacco Use    Smoking status: Former Smoker     Packs/day: 0 25     Years: 10 00     Pack years: 2 50     Types: Cigarettes     Quit date: 2021     Years since quittin 1    Smokeless tobacco: Never Used    Tobacco comment: 1 or 2 a week   Vaping Use    Vaping Use: Never used   Substance and Sexual Activity    Alcohol use:  Yes    Drug use: No    Sexual activity: Not on file         Current Outpatient Medications:     amLODIPine (NORVASC) 10 mg tablet, TAKE ONE TABLET BY MOUTH EVERY MORNING, Disp: , Rfl:     atorvastatin (LIPITOR) 40 mg tablet, , Disp: , Rfl:     Aubagio 14 MG TABS, , Disp: , Rfl:     Cholecalciferol 125 MCG (5000 UT) capsule, 5,000 Units, Disp: , Rfl:     cyclobenzaprine (FLEXERIL) 10 mg tablet, Take 1 tablet (10 mg total) by mouth 3 (three) times a day as needed for muscle spasms, Disp: 60 tablet, Rfl: 0    Docusate Sodium (DSS) 100 MG CAPS, Take 100 mg by mouth 2 (two) times a day, Disp: , Rfl:     famotidine (PEPCID) 20 mg tablet, Take 20 mg by mouth 2 (two) times a day, Disp: , Rfl:     ferrous sulfate 325 (65 Fe) mg tablet, Take 65 mg by mouth, Disp: , Rfl:     folic acid (FOLVITE) 1 mg tablet, , Disp: , Rfl:     gabapentin (NEURONTIN) 600 MG tablet, , Disp: , Rfl:     hydrALAZINE (APRESOLINE) 50 mg tablet, Take 50 mg by mouth Three times a day, Disp: , Rfl:     ibuprofen (MOTRIN) 600 mg tablet, Take 600 mg by mouth 3 (three) times a day  , Disp: , Rfl:     latanoprost (XALATAN) 0 005 % ophthalmic solution, Administer 1 drop to both eyes daily  , Disp: , Rfl:     losartan (COZAAR) 50 mg tablet, Take one tablet by mouth once a day, Disp: , Rfl:     magnesium oxide (MAG-OX) 400 mg tablet, Take 400 mg by mouth daily, Disp: , Rfl:     metoprolol tartrate (LOPRESSOR) 25 mg tablet, metoprolol tartrate 25 mg tablet, Disp: , Rfl:     NIFEdipine ER (ADALAT CC) 60 MG 24 hr tablet, TAKE TWO TABLETS BY MOUTH EVERY DAY, Disp: , Rfl:     nitroglycerin (NITROSTAT) 0 4 mg SL tablet, DISSOLVE 1 TABLET UNDER TONGUE EVERY 5 MINUTES FOR UP TO 3 DOSES AS NEEDED FOR CHEST PAIN, CALL 911 IF PAIN PERSISTS OR UNRELIEVED, Disp: , Rfl:     omeprazole (PriLOSEC) 20 mg delayed release capsule, omeprazole 20 mg capsule,delayed release, Disp: , Rfl:     timolol (BETIMOL) 0 5 % ophthalmic solution, Administer 1 drop to both eyes daily  , Disp: , Rfl:     timolol (TIMOPTIC) 0 5 % ophthalmic solution, , Disp: , Rfl:     Allergies   Allergen Reactions    Acetaminophen Swelling    Aspirin Swelling    Lisinopril Swelling       Physical Exam:    Resp 18   Ht 5' 11" (1 803 m)   Wt 88 5 kg (195 lb)   BMI 27 20 kg/m²     Constitutional:normal, well developed, well nourished, alert, in no distress and non-toxic and no overt pain behavior    Eyes:anicteric  HEENT:grossly intact  Neck:supple, symmetric, trachea midline and no masses   Pulmonary:even and unlabored  Cardiovascular:No edema or pitting edema present  Skin:Normal without rashes or lesions and well hydrated  Psychiatric:Mood and affect appropriate  Neurologic:Cranial Nerves II-XII grossly intact  Musculoskeletal:normal      Imaging  FL spine and pain procedure    (Results Pending)   FL spine and pain procedure    (Results Pending)         Orders Placed This Encounter   Procedures    FL spine and pain procedure    FL spine and pain procedure

## 2022-03-03 ENCOUNTER — TELEPHONE (OUTPATIENT)
Dept: RADIOLOGY | Facility: CLINIC | Age: 62
End: 2022-03-03

## 2022-03-03 NOTE — TELEPHONE ENCOUNTER
Called pt but picks up as "calling restrictions preventing the completion of my call"  Have auth for GEOVANNA  Need to schedule  Will keep trying  Need PT notes for low back to get auth for Left L3-S1 DAYO  Did leave message @ [facility] requesting notes on 2/23/22 and have not rec yet  Guido Boggs), Plastic Surgery; Surgery of the Hand  [address] [place] [address]  Phone: [phone]  Fax: [phone]

## 2022-03-16 NOTE — TELEPHONE ENCOUNTER
Scheduled GEOVANNA for 3/24 at 1040  Pt to hold ibuprofen 24 hours prior    Pt states she had PT at [facility] on [address] in December for her lower back    Can we obtain these records?

## 2022-03-24 ENCOUNTER — HOSPITAL ENCOUNTER (OUTPATIENT)
Dept: RADIOLOGY | Facility: CLINIC | Age: 62
Discharge: HOME | End: 2022-03-24
Attending: STUDENT IN AN ORGANIZED HEALTH CARE EDUCATION/TRAINING PROGRAM | Admitting: STUDENT IN AN ORGANIZED HEALTH CARE EDUCATION/TRAINING PROGRAM
Payer: COMMERCIAL

## 2022-03-24 DIAGNOSIS — M54.12 CERVICAL RADICULOPATHY: ICD-10-CM

## 2022-03-24 NOTE — TELEPHONE ENCOUNTER
Pt here for GEOVANNA, but was recently started on Celebrex, which she took this am   Discussed with SP  Rescheduled pt for 3/29/22 double book at 0900  Pt aware to hold her Celebrex starting tomorrow  Aware she can take Ibuprofen up until 24hours prior to appt

## 2022-03-29 ENCOUNTER — HOSPITAL ENCOUNTER (OUTPATIENT)
Dept: RADIOLOGY | Facility: CLINIC | Age: 62
Discharge: HOME | End: 2022-03-29
Attending: STUDENT IN AN ORGANIZED HEALTH CARE EDUCATION/TRAINING PROGRAM | Admitting: STUDENT IN AN ORGANIZED HEALTH CARE EDUCATION/TRAINING PROGRAM
Payer: COMMERCIAL

## 2022-03-29 VITALS
TEMPERATURE: 96.9 F | DIASTOLIC BLOOD PRESSURE: 81 MMHG | SYSTOLIC BLOOD PRESSURE: 128 MMHG | OXYGEN SATURATION: 97 % | RESPIRATION RATE: 20 BRPM | HEART RATE: 90 BPM

## 2022-03-29 PROCEDURE — 62321 NJX INTERLAMINAR CRV/THRC: CPT | Performed by: STUDENT IN AN ORGANIZED HEALTH CARE EDUCATION/TRAINING PROGRAM

## 2022-03-29 RX ORDER — METHYLPREDNISOLONE ACETATE 80 MG/ML
80 INJECTION, SUSPENSION INTRA-ARTICULAR; INTRALESIONAL; INTRAMUSCULAR; PARENTERAL; SOFT TISSUE ONCE
Status: COMPLETED | OUTPATIENT
Start: 2022-03-29 | End: 2022-03-29

## 2022-03-29 RX ADMIN — METHYLPREDNISOLONE ACETATE 80 MG: 80 INJECTION, SUSPENSION INTRA-ARTICULAR; INTRALESIONAL; INTRAMUSCULAR; PARENTERAL; SOFT TISSUE at 09:11

## 2022-03-29 RX ADMIN — IOHEXOL 1 ML: 300 INJECTION, SOLUTION INTRAVENOUS at 09:10

## 2022-03-29 NOTE — DISCHARGE INSTR - LAB
Epidural Steroid Injection   WHAT YOU NEED TO KNOW:   An epidural steroid injection (JADA) is a procedure to inject steroid medicine into the epidural space  The epidural space is between your spinal cord and vertebrae  Steroids reduce inflammation and fluid buildup in your spine that may be causing pain  You may be given pain medicine along with the steroids  ACTIVITY  Do not drive or operate machinery today  No strenuous activity today - bending, lifting, etc   You may resume normal activites starting tomorrow - start slowly and as tolerated  You may shower today, but no tub baths or hot tubs  You may have numbness for several hours from the local anesthetic  Please use caution and common sense, especially with weight-bearing activities  CARE OF THE INJECTION SITE  If you have soreness or pain, apply ice to the area today (20 minutes on/20 minutes off)  Starting tomorrow, you may use warm, moist heat or ice if needed  You may have an increase or change in your discomfort for 36-48 hours after your treatment  Apply ice and continue with any pain medication you have been prescribed  Notify the [facility] if you have any of the following: redness, drainage, swelling, headache, stiff neck or fever above 100°F     SPECIAL INSTRUCTIONS  Our office will contact you in approximately 7 days for a progress report  MEDICATIONS  Continue to take all routine medications  Our office may have instructed you to hold some medications  As no general anesthesia was used in today's procedure, you should not experience any side effects related to anesthesia  If you have a problem specifically related to your procedure, please call our office at [phone]  Problems not related to your procedure should be directed to your primary care physician

## 2022-03-29 NOTE — H&P
History of Present Illness: The patient is a 57 y o  female who presents with complaints of neck and arm pain    Patient Active Problem List   Diagnosis    Stroke [facility])    Essential hypertension    Abdominal pain    Nausea and/or vomiting    Syncope    Stroke-like symptoms    Bradycardia    Stage 3 chronic kidney disease ([facility] [address] )    Chronic kidney disease-mineral and bone disorder    Multiple sclerosis ([facility] [address] )    Lumbar radiculopathy    [facility]acute [facility]) ([facility] )       Past Medical History:   Diagnosis Date    Chronic kidney disease     Glaucoma     Hypertension     Migraine     Multiple sclerosis ([facility] [address] )     Night muscle spasms     Stroke ([facility] )     2 strokes in past, 2011 and 2013       Past Surgical History:   Procedure Laterality Date    ESOPHAGOGASTRODUODENOSCOPY N/A 8/9/2016    Procedure: ESOPHAGOGASTRODUODENOSCOPY (EGD); Surgeon: Lee Reis MD;  Location:  GI LAB;   Service:     HIP ARTHROPLASTY Bilateral          Current Outpatient Medications:     amLODIPine (NORVASC) 10 mg tablet, TAKE ONE TABLET BY MOUTH EVERY MORNING, Disp: , Rfl:     atorvastatin (LIPITOR) 40 mg tablet, , Disp: , Rfl:     Aubagio 14 MG TABS, , Disp: , Rfl:     Cholecalciferol 125 MCG (5000 UT) capsule, 5,000 Units, Disp: , Rfl:     cyclobenzaprine (FLEXERIL) 10 mg tablet, Take 1 tablet (10 mg total) by mouth 3 (three) times a day as needed for muscle spasms, Disp: 60 tablet, Rfl: 0    Docusate Sodium (DSS) 100 MG CAPS, Take 100 mg by mouth 2 (two) times a day, Disp: , Rfl:     famotidine (PEPCID) 20 mg tablet, Take 20 mg by mouth 2 (two) times a day, Disp: , Rfl:     ferrous sulfate 325 (65 Fe) mg tablet, Take 65 mg by mouth, Disp: , Rfl:     folic acid (FOLVITE) 1 mg tablet, , Disp: , Rfl:     gabapentin (NEURONTIN) 600 MG tablet, , Disp: , Rfl:     hydrALAZINE (APRESOLINE) 50 mg tablet, Take 50 mg by mouth Three times a day, Disp: , Rfl:     ibuprofen (MOTRIN) 600 mg tablet, Take 600 mg by mouth 3 (three) times a day  , Disp: , Rfl:     latanoprost (XALATAN) 0 005 % ophthalmic solution, Administer 1 drop to both eyes daily  , Disp: , Rfl:     losartan (COZAAR) 50 mg tablet, Take one tablet by mouth once a day, Disp: , Rfl:     magnesium oxide (MAG-OX) 400 mg tablet, Take 400 mg by mouth daily, Disp: , Rfl:     metoprolol tartrate (LOPRESSOR) 25 mg tablet, metoprolol tartrate 25 mg tablet, Disp: , Rfl:     NIFEdipine ER (ADALAT CC) 60 MG 24 hr tablet, TAKE TWO TABLETS BY MOUTH EVERY DAY, Disp: , Rfl:     nitroglycerin (NITROSTAT) 0 4 mg SL tablet, DISSOLVE 1 TABLET UNDER TONGUE EVERY 5 MINUTES FOR UP TO 3 DOSES AS NEEDED FOR CHEST PAIN, CALL 911 IF PAIN PERSISTS OR UNRELIEVED, Disp: , Rfl:     omeprazole (PriLOSEC) 20 mg delayed release capsule, omeprazole 20 mg capsule,delayed release, Disp: , Rfl:     timolol (BETIMOL) 0 5 % ophthalmic solution, Administer 1 drop to both eyes daily  , Disp: , Rfl:     timolol (TIMOPTIC) 0 5 % ophthalmic solution, , Disp: , Rfl:     Allergies   Allergen Reactions    Acetaminophen Swelling    Aspirin Swelling    Lisinopril Swelling       Physical Exam: There were no vitals filed for this visit  General: Awake, Alert, Oriented x 3, Mood and affect appropriate  Respiratory: Respirations even and unlabored  Cardiovascular: Peripheral pulses intact; no edema  Musculoskeletal Exam: neck and arm apin    ASA Score: 3    Patient/Chart Verification  Patient ID Verified: Verbal  Consents Confirmed: Procedural,To be obtained in the Pre-Procedure area  H&P( within 30 days) Verified: Yes  Interval H&P(within 24 hr) Complete (required for Outpatients and Surgery Admit only): Yes  Allergies Reviewed: Yes  Anticoag/NSAID held?: Yes (celebrex held)  Currently on antibiotics?: No    Assessment: No diagnosis found      Plan: c7-t1 keila

## 2022-04-05 ENCOUNTER — TELEPHONE (OUTPATIENT)
Dept: PAIN MEDICINE | Facility: CLINIC | Age: 62
End: 2022-04-05

## 2022-04-13 ENCOUNTER — TELEPHONE (OUTPATIENT)
Dept: NEPHROLOGY | Facility: CLINIC | Age: 62
End: 2022-04-13

## 2022-04-18 NOTE — TELEPHONE ENCOUNTER
Pt called for status of procedure- informed her that [facility] is awaiting approval- pt asked for a call back       [facility] [phone]

## 2022-04-19 ENCOUNTER — TELEPHONE (OUTPATIENT)
Dept: PAIN MEDICINE | Facility: CLINIC | Age: 62
End: 2022-04-19

## 2022-04-19 NOTE — TELEPHONE ENCOUNTER
S/w pt, she received Dalia Martinez [id number] in mail  Looked online [employer] but unable to pull up that Dalia Martinez  S/w Alexis @ [facility] is for [id number], issued to Dr Issa Green @ [address] [place] [address]  S/w pt and advised auth details  She will call Dr Elliott Lira' office to see if he will be doing MBB for low back  Pt will call back

## 2022-04-19 NOTE — TELEPHONE ENCOUNTER
Pt called in to schedule a procedure   Pt has the approval            Please be advised thank you   Pt can be reached a @ [phone]

## 2022-04-25 ENCOUNTER — TELEPHONE (OUTPATIENT)
Dept: NEPHROLOGY | Facility: CLINIC | Age: 62
End: 2022-04-25

## 2022-04-26 ENCOUNTER — OFFICE VISIT (OUTPATIENT)
Dept: NEPHROLOGY | Facility: CLINIC | Age: 62
End: 2022-04-26
Payer: COMMERCIAL

## 2022-04-26 VITALS
SYSTOLIC BLOOD PRESSURE: 120 MMHG | RESPIRATION RATE: 16 BRPM | HEART RATE: 108 BPM | WEIGHT: 203.2 LBS | BODY MASS INDEX: 28.45 KG/M2 | TEMPERATURE: 97.5 F | OXYGEN SATURATION: 97 % | HEIGHT: 71 IN | DIASTOLIC BLOOD PRESSURE: 70 MMHG

## 2022-04-26 DIAGNOSIS — N18.30 STAGE 3 CHRONIC KIDNEY DISEASE, UNSPECIFIED WHETHER STAGE 3A OR 3B CKD (HCC): Primary | ICD-10-CM

## 2022-04-26 DIAGNOSIS — G35 MULTIPLE SCLEROSIS (HCC): ICD-10-CM

## 2022-04-26 DIAGNOSIS — M89.9 CHRONIC KIDNEY DISEASE-MINERAL AND BONE DISORDER: ICD-10-CM

## 2022-04-26 DIAGNOSIS — E83.9 CHRONIC KIDNEY DISEASE-MINERAL AND BONE DISORDER: ICD-10-CM

## 2022-04-26 DIAGNOSIS — I10 ESSENTIAL HYPERTENSION: Chronic | ICD-10-CM

## 2022-04-26 DIAGNOSIS — N17.9 AKI (ACUTE KIDNEY INJURY) (HCC): ICD-10-CM

## 2022-04-26 DIAGNOSIS — M54.16 LUMBAR RADICULOPATHY: ICD-10-CM

## 2022-04-26 DIAGNOSIS — N18.9 CHRONIC KIDNEY DISEASE-MINERAL AND BONE DISORDER: ICD-10-CM

## 2022-04-26 PROCEDURE — 99214 OFFICE O/P EST MOD 30 MIN: CPT | Performed by: INTERNAL MEDICINE

## 2022-04-26 RX ORDER — CELECOXIB 200 MG/1
CAPSULE ORAL
COMMUNITY
Start: 2022-03-16

## 2022-04-26 NOTE — ASSESSMENT & PLAN NOTE
As I mention before kidney function is deteriorating  She will stop Celebrex and take Tylenol if needed  Also hydration was discussed with her    I will repeat the blood test in 1 month and in 3 months and see her back at that time

## 2022-04-26 NOTE — PROGRESS NOTES
NEPHROLOGY OFFICE FOLLOW UP  Evangelista Mustafa 57 y o  female MRN: [id number]    Encounter: [id number] 4/26/2022    REASON FOR VISIT: Evangelista Mustafa is a 57 y o  female who is here on 4/26/2022 for Chronic Kidney Disease and Follow-up    HPI:    Raynald Cousin came in today for follow-up of CKD  Patient with CKD stage 3 along with hypertension and multiple  sclerosis  Also chronic back pain for which she was given Celebrex by Orthopedics     Denies any other acute complaint     No chest pain no palpitation or shortness of breath     No nausea no vomiting     Denies any urinary complaint      REVIEW OF SYSTEMS:    Review of Systems   Constitutional: Negative for activity change and fatigue  HENT: Negative for congestion and ear discharge  Eyes: Negative for photophobia and pain  Respiratory: Negative for apnea and choking  Cardiovascular: Negative for chest pain and palpitations  Gastrointestinal: Negative for abdominal distention and blood in stool  Endocrine: Negative for heat intolerance and polyphagia  Genitourinary: Negative for flank pain and urgency  Musculoskeletal: Negative for neck pain and neck stiffness  Skin: Negative for color change and wound  Allergic/Immunologic: Negative for food allergies and immunocompromised state  Neurological: Negative for seizures and facial asymmetry  Hematological: Negative for adenopathy  Does not bruise/bleed easily  Psychiatric/Behavioral: Negative for self-injury and suicidal ideas  PAST MEDICAL HISTORY:  Past Medical History:   Diagnosis Date    Chronic kidney disease     Glaucoma     Hypertension     Migraine     [facility] sclerosis ([facility]     Night muscle spasms     Stroke ([facility] )     2 strokes in past, 2011 and 2013       PAST SURGICAL HISTORY:  Past Surgical History:   Procedure Laterality Date    ESOPHAGOGASTRODUODENOSCOPY N/A 8/9/2016    Procedure: ESOPHAGOGASTRODUODENOSCOPY (EGD);   Surgeon: Liliam Grayson MD;  [facility]: [facility]; Service:     HIP ARTHROPLASTY Bilateral        SOCIAL HISTORY:  Social History     Substance and Sexual Activity   Alcohol Use Yes     Social History     Substance and Sexual Activity   Drug Use No     Social History     Tobacco Use   Smoking Status Former Smoker    Packs/day: 0 25    Years: 10 00    Pack years: 2 50    Types: Cigarettes    Quit date: 2021    Years since quittin 3   Smokeless Tobacco Never Used   Tobacco Comment    1 or 2 a week       FAMILY HISTORY:  Family History   Problem Relation Age of Onset    No Known Problems Mother     No Known Problems Father        MEDICATIONS:    Current Outpatient Medications:     amLODIPine (NORVASC) 10 mg tablet, TAKE ONE TABLET BY MOUTH EVERY MORNING, Disp: , Rfl:     atorvastatin (LIPITOR) 40 mg tablet, , Disp: , Rfl:     Aubagio 14 MG TABS, , Disp: , Rfl:     celecoxib (CeleBREX) 200 mg capsule, , Disp: , Rfl:     Cholecalciferol 125 MCG (5000 UT) capsule, 5,000 Units, Disp: , Rfl:     cyclobenzaprine (FLEXERIL) 10 mg tablet, Take 1 tablet (10 mg total) by mouth 3 (three) times a day as needed for muscle spasms, Disp: 60 tablet, Rfl: 0    ferrous sulfate 325 (65 Fe) mg tablet, Take 65 mg by mouth, Disp: , Rfl:     folic acid (FOLVITE) 1 mg tablet, , Disp: , Rfl:     gabapentin (NEURONTIN) 600 MG tablet, 800 mg 3 (three) times a day  , Disp: , Rfl:     hydrALAZINE (APRESOLINE) 50 mg tablet, Take 50 mg by mouth Three times a day, Disp: , Rfl:     latanoprost (XALATAN) 0 005 % ophthalmic solution, Administer 1 drop to both eyes 2 (two) times a day  , Disp: , Rfl:     losartan (COZAAR) 50 mg tablet, Take one tablet by mouth once a day, Disp: , Rfl:     NIFEdipine ER (ADALAT CC) 60 MG 24 hr tablet, TAKE TWO TABLETS BY MOUTH EVERY DAY, Disp: , Rfl:     nitroglycerin (NITROSTAT) 0 4 mg SL tablet, DISSOLVE 1 TABLET UNDER TONGUE EVERY 5 MINUTES FOR UP TO 3 DOSES AS NEEDED FOR CHEST PAIN, CALL 911 IF PAIN PERSISTS OR UNRELIEVED, Disp: , Rfl:   omeprazole (PriLOSEC) 20 mg delayed release capsule, omeprazole 20 mg capsule,delayed release, Disp: , Rfl:     timolol (BETIMOL) 0 5 % ophthalmic solution, Administer 1 drop to both eyes 2 (two) times a day  , Disp: , Rfl:     PHYSICAL EXAM:  Vitals:    04/26/22 1443   BP: 120/70   BP Location: Right arm   Patient Position: Sitting   Pulse: (!) 108   Resp: 16   Temp: 97 5 °F (36 4 °C)   TempSrc: Temporal   SpO2: 97%   Weight: 92 2 kg (203 lb 3 2 oz)   Height: 5' 11" (1 803 m)     Body mass index is 28 34 kg/m²  Physical Exam  Constitutional:       General: She is not in acute distress  Appearance: Normal appearance  She is well-developed  HENT:      Head: Normocephalic  Eyes:      General: No scleral icterus  Conjunctiva/sclera: Conjunctivae normal    Neck:      Vascular: No JVD  Cardiovascular:      Rate and Rhythm: Normal rate  Heart sounds: Normal heart sounds  Pulmonary:      Effort: Pulmonary effort is normal       Breath sounds: Normal breath sounds  No wheezing  Abdominal:      General: Bowel sounds are normal       Palpations: Abdomen is soft  Tenderness: There is no abdominal tenderness  Musculoskeletal:         General: No swelling  Normal range of motion  Cervical back: Neck supple  Skin:     General: Skin is warm  Findings: No rash  Neurological:      General: No focal deficit present  Mental Status: She is alert and oriented to person, place, and time     Psychiatric:         Behavior: Behavior normal          LAB RESULTS:  Results for orders placed or performed in visit on 03/08/22   Protein / creatinine ratio, urine   Result Value Ref Range    PROTEIN UA 19 1     EXT Creatinine Urine 117 0     EXTERNAL Ur Prot/Creat Ratio 0 16        ASSESSMENT and PLAN:      Stage 3 chronic kidney disease ([facility] [address] )  Lab Results   Component Value Date    EGFR 53 0 08/10/2016    EGFR 53 5 08/10/2016    EGFR 55 6 08/08/2016    CREATININE 1 26 08/10/2016    CREATININE 1 25 08/10/2016    CREATININE 1 21 08/08/2016   Renal function is getting worse  Possibly because of Celebrex  Advised to avoid Celebrex and hydration and will monitor it closely    JOVANA (acute kidney injury) ([facility] [address] )  As I mention before kidney function is deteriorating  She will stop Celebrex and take Tylenol if needed  Also hydration was discussed with her  I will repeat the blood test in 1 month and in 3 months and see her back at that time    Chronic kidney disease-mineral and bone disorder  Lab Results   Component Value Date    EGFR 53 0 08/10/2016    EGFR 53 5 08/10/2016    EGFR 55 6 08/08/2016    CREATININE 1 26 08/10/2016    CREATININE 1 25 08/10/2016    CREATININE 1 21 08/08/2016   PTH and phosphorus along with vitamin-D are within acceptable range and will continue to monitor    Essential hypertension  Reasonably well controlled with help of losartan  Will continue        Everything discussed with the patient at length  I will see her back in 3-4 months  She will get blood work in 1 month and before next visit      Portions of the record may have been created with voice recognition software  Occasional wrong word or "sound a like" substitutions may have occurred due to the inherent limitations of voice recognition software  Read the chart carefully and recognize, using context, where substitutions have occurred  If you have any questions, please contact the dictating provider

## 2022-04-26 NOTE — ASSESSMENT & PLAN NOTE
Lab Results   Component Value Date    EGFR 53 0 08/10/2016    EGFR 53 5 08/10/2016    EGFR 55 6 08/08/2016    CREATININE 1 26 08/10/2016    CREATININE 1 25 08/10/2016    CREATININE 1 21 08/08/2016   Renal function is getting worse  Possibly because of Celebrex    Advised to avoid Celebrex and hydration and will monitor it closely

## 2022-04-26 NOTE — PATIENT INSTRUCTIONS
Chronic Kidney Disease   AMBULATORY CARE:   Chronic kidney disease (CKD)  is the gradual and permanent loss of kidney function  Normally, the kidneys remove fluid, chemicals, and waste from your blood  These wastes are turned into urine by your kidneys  CKD may worsen over time and lead to kidney failure  Common signs and symptoms include the following:   · Changes in how often you need to urinate    · Swelling in your arms, legs, or feet    · Shortness of breath    · Fatigue or weakness    · Bad or bitter taste in your mouth    · Nausea, vomiting, or loss of appetite    Call your local emergency number (911 in the [address] [facility] [facility]) if:   · You have a seizure  · You have shortness of breath  Seek care immediately if:   · You are confused and very drowsy  Call your doctor or nephrologist if:   · You suddenly gain or lose more weight than your healthcare provider has told you is okay  · You have itchy skin or a rash  · You urinate more or less than you normally do  · You have blood in your urine  · You have nausea and are vomiting  · You have fatigue or muscle weakness  · You have hiccups that will not stop  · You have questions or concerns about your condition or care  How CKD is diagnosed:  CKD has 5 stages  Your healthcare provider will use results from the following tests to find the stage of CKD you have:  · Blood and urine tests  show how well your kidneys are working  They may also show the cause of your CKD  · Ultrasound, CT scan, or MRI  pictures may be used to check your kidneys  You may be given contrast liquid to help your kidneys show up better in the pictures  Tell the healthcare provider if you have ever had an allergic reaction to contrast liquid  Do not enter the MRI room with anything metal  Metal can cause serious injury  Tell the healthcare provider if you have any metal in or on your body      · A biopsy  is a procedure to remove a small piece of tissue from your kidney  It is done to find the cause of your CKD  Treatment  can help control signs and symptoms, and prevent a worse stage of CKD  Your care team may include specialists, such as a dietitian or a heart specialist  This depends on the stage of your CKD and if you have other health conditions to manage  Healthcare providers will work with you to create a plan based on your decisions for treatment  Your treatment plan may include any of the following:  · Medicines  may be given to decrease your blood pressure and get rid of extra fluid  You may also receive medicine to manage health conditions that may occur with CKD, such as anemia, diabetes, and heart disease  · Dialysis  is a treatment to remove chemicals and waste from your blood when your kidneys can no longer do this  · Surgery  may be needed to create an arteriovenous fistula (AVF) in your arm or insert a catheter into your abdomen  This is done so you can receive dialysis  · A kidney transplant  may be done if your CKD becomes severe  What you can do to manage CKD: Management may include making some lifestyle changes  Tell your healthcare provider if you have any concerns about being able to make changes  He or she can help you find solutions, including working with specialists  Ask for help creating a plan to break large goals into smaller steps  Your plan may include any of the following:  · Manage other health conditions  Your healthcare provider will work with you to make a care plan that meets your needs  You will be checked regularly for heart disease or other conditions that can make CKD worse, such as diabetes  Your blood pressure will be closely monitored  You will also get a target blood pressure and help making a plan to reach your target  This may include taking your blood pressure at home  · Maintain a healthy weight  Your weight and body mass index (BMI) will be checked regularly   BMI helps find if your weight is healthy for your height  Your healthcare provider will use other tests to check your muscle and protein levels  Extra weight can strain your kidneys  A low weight or low muscle mass can make you feel more tired  You may have trouble doing your daily activities  Ask your provider what a healthy weight is for you  He or she can help you create a plan to lose or gain weight safely, if needed  The plan may include keeping a food diary  This is a list of foods and liquids you have each day  Your provider will use the diary to help you make changes, if needed  Changes are based on your health and any other conditions you have, such as diabetes  · Create an exercise plan  Regular exercise can help you manage CKD, high blood pressure, and diabetes  Exercise also helps control weight  Your provider can help you create exercise goals and a plan to reach those goals  For example, your goal may be to exercise for 30 minutes in a day  Your plan can include breaking exercise into 10 minute sessions, 3 times during the day  · Create a healthy eating plan  Your provider may tell you to eat food low in potassium, phosphorus, or protein  Your provider may also recommend vitamin or mineral supplements  Do not take any supplements without talking to your provider  A dietitian can help you plan meals if needed  Ask how much liquid to drink each day and which liquids are best for you  · Limit sodium (salt) as directed  You may need to limit sodium to less than 2,300 milligrams (mg) each day  Ask your dietitian or healthcare provider how much sodium you can have each day  The amount depends on your stage of kidney disease  Table salt, canned foods, soups, salted snacks, and processed meats, like deli meats and sausage, are high in sodium  Your provider or a dietitian can show you how to read food labels for sodium  · Limit alcohol as directed  Alcohol can cause fluid retention and can affect your kidneys   Ask how much alcohol is safe for you  A drink of alcohol is 12 ounces of beer, 5 ounces of wine, or 1½ ounces of liquor  · Do not smoke  Nicotine and other chemicals in cigarettes and cigars can cause kidney damage  Ask your provider for information if you currently smoke and need help to quit  E-cigarettes or smokeless tobacco still contain nicotine  Talk to your provider before you use these products  · Ask about over-the-counter medicines  Medicines such as NSAIDs and laxatives may harm your kidneys  Some cough and cold medicines can raise your blood pressure  Always ask if a medicine is safe before you take it  · Ask about vaccines you may need  CKD can increase your risk for infections such as pneumonia, influenza, and hepatitis  Vaccines lower your risk for infection  Your healthcare provider will tell you which vaccines you need and when to get them  Follow up with your doctor or nephrologist as directed: You will need to return for tests to monitor your kidney and nerve function, and your parathyroid hormone level  Your medicines may be changed, based on certain test results  Write down your questions so you remember to ask them during your visits  © Copyright [employer] 2022 Information is for End User's use only and may not be sold, redistributed or otherwise used for commercial purposes  All illustrations and images included in CareNotes® are the copyrighted property of [employer]  or Dhaval Hernandez  The above information is an  only  It is not intended as medical advice for individual conditions or treatments  Talk to your doctor, nurse or pharmacist before following any medical regimen to see if it is safe and effective for you

## 2022-04-26 NOTE — ASSESSMENT & PLAN NOTE
Lab Results   Component Value Date    EGFR 53 0 08/10/2016    EGFR 53 5 08/10/2016    EGFR 55 6 08/08/2016    CREATININE 1 26 08/10/2016    CREATININE 1 25 08/10/2016    CREATININE 1 21 08/08/2016   PTH and phosphorus along with vitamin-D are within acceptable range and will continue to monitor

## 2022-04-27 NOTE — TELEPHONE ENCOUNTER
S/w pt and scheduled GEOVANNA for 5/3/22 915 am. Gave pre procedure instructions and /vaccine policy. Advised no NSAIDs 4 days prior to procedure.    Tanesha Kohli MD

## 2022-04-27 NOTE — TELEPHONE ENCOUNTER
S/w pt and scheduled GEOVANNA for 5/3/22 915 am  Gave pre procedure instructions and /vaccine policy  Advised no NSAIDs 4 days prior to procedure

## 2022-04-27 NOTE — TELEPHONE ENCOUNTER
Please call pt back to schedule a neck procedure   She got a approval to get the injection from the insurance    Pt [phone]

## 2022-05-03 ENCOUNTER — HOSPITAL ENCOUNTER (OUTPATIENT)
Dept: RADIOLOGY | Facility: CLINIC | Age: 62
Discharge: HOME | End: 2022-05-03
Attending: STUDENT IN AN ORGANIZED HEALTH CARE EDUCATION/TRAINING PROGRAM | Admitting: STUDENT IN AN ORGANIZED HEALTH CARE EDUCATION/TRAINING PROGRAM
Payer: COMMERCIAL

## 2022-05-03 VITALS
RESPIRATION RATE: 20 BRPM | DIASTOLIC BLOOD PRESSURE: 79 MMHG | HEART RATE: 105 BPM | TEMPERATURE: 97.5 F | OXYGEN SATURATION: 98 % | SYSTOLIC BLOOD PRESSURE: 116 MMHG

## 2022-05-03 DIAGNOSIS — M54.12 CERVICAL RADICULOPATHY: ICD-10-CM

## 2022-05-03 PROCEDURE — 62321 NJX INTERLAMINAR CRV/THRC: CPT | Performed by: STUDENT IN AN ORGANIZED HEALTH CARE EDUCATION/TRAINING PROGRAM

## 2022-05-03 RX ORDER — METHYLPREDNISOLONE ACETATE 80 MG/ML
80 INJECTION, SUSPENSION INTRA-ARTICULAR; INTRALESIONAL; INTRAMUSCULAR; PARENTERAL; SOFT TISSUE ONCE
Status: COMPLETED | OUTPATIENT
Start: 2022-05-03 | End: 2022-05-03

## 2022-05-03 RX ADMIN — IOHEXOL 1 ML: 300 INJECTION, SOLUTION INTRAVENOUS at 09:31

## 2022-05-03 RX ADMIN — METHYLPREDNISOLONE ACETATE 80 MG: 80 INJECTION, SUSPENSION INTRA-ARTICULAR; INTRALESIONAL; INTRAMUSCULAR; PARENTERAL; SOFT TISSUE at 09:32

## 2022-05-03 NOTE — DISCHARGE INSTR - LAB
Epidural Steroid Injection   WHAT YOU NEED TO KNOW:   An epidural steroid injection (JADA) is a procedure to inject steroid medicine into the epidural space  The epidural space is between your spinal cord and vertebrae  Steroids reduce inflammation and fluid buildup in your spine that may be causing pain  You may be given pain medicine along with the steroids  ACTIVITY  Do not drive or operate machinery today  No strenuous activity today - bending, lifting, etc   You may resume normal activites starting tomorrow - start slowly and as tolerated  You may shower today, but no tub baths or hot tubs  You may have numbness for several hours from the local anesthetic  Please use caution and common sense, especially with weight-bearing activities  CARE OF THE INJECTION SITE  If you have soreness or pain, apply ice to the area today (20 minutes on/20 minutes off)  Starting tomorrow, you may use warm, moist heat or ice if needed  You may have an increase or change in your discomfort for 36-48 hours after your treatment  Apply ice and continue with any pain medication you have been prescribed  Notify the [facility] if you have any of the following: redness, drainage, swelling, headache, stiff neck or fever above 100°F     SPECIAL INSTRUCTIONS  Our office will contact you in approximately 7 days for a progress report  MEDICATIONS  Continue to take all routine medications  Our office may have instructed you to hold some medications  As no general anesthesia was used in today's procedure, you should not experience any side effects related to anesthesia  If you have a problem specifically related to your procedure, please call our office at [phone]  Problems not related to your procedure should be directed to your primary care physician

## 2022-05-03 NOTE — H&P
History of Present Illness: The patient is a 57 y o  female who presents with complaints of neck and arm pain    Patient Active Problem List   Diagnosis    Stroke [facility])    Essential hypertension    Abdominal pain    Nausea and/or vomiting    Syncope    Stroke-like symptoms    Bradycardia    Stage 3 chronic kidney disease ([facility] [address] )    Chronic kidney disease-mineral and bone disorder    [facility]    Lumbar radiculopathy    JOVANA [facility]) [facility]       Past Medical History:   Diagnosis Date    Chronic kidney disease     Glaucoma     Hypertension     Migraine     [facility]     Night muscle spasms     Stroke [facility]     2 strokes in past, 2011 and 2013       Past Surgical History:   Procedure Laterality Date    ESOPHAGOGASTRODUODENOSCOPY N/A 8/9/2016    Procedure: ESOPHAGOGASTRODUODENOSCOPY (EGD); Surgeon: Rachelle Persaud MD;  Location: [facility];   Service:     HIP ARTHROPLASTY Bilateral          Current Outpatient Medications:     amLODIPine (NORVASC) 10 mg tablet, TAKE ONE TABLET BY MOUTH EVERY MORNING, Disp: , Rfl:     atorvastatin (LIPITOR) 40 mg tablet, , Disp: , Rfl:     Aubagio 14 MG TABS, , Disp: , Rfl:     celecoxib (CeleBREX) 200 mg capsule, , Disp: , Rfl:     Cholecalciferol 125 MCG (5000 UT) capsule, 5,000 Units, Disp: , Rfl:     cyclobenzaprine (FLEXERIL) 10 mg tablet, Take 1 tablet (10 mg total) by mouth 3 (three) times a day as needed for muscle spasms, Disp: 60 tablet, Rfl: 0    ferrous sulfate 325 (65 Fe) mg tablet, Take 65 mg by mouth, Disp: , Rfl:     folic acid (FOLVITE) 1 mg tablet, , Disp: , Rfl:     gabapentin (NEURONTIN) 600 MG tablet, 800 mg 3 (three) times a day  , Disp: , Rfl:     hydrALAZINE (APRESOLINE) 50 mg tablet, Take 50 mg by mouth Three times a day, Disp: , Rfl:     latanoprost (XALATAN) 0 005 % ophthalmic solution, Administer 1 drop to both eyes 2 (two) times a day  , Disp: , Rfl:     losartan (COZAAR) 50 mg tablet, Take one tablet by mouth once a day, Disp: , Rfl:     NIFEdipine ER (ADALAT CC) 60 MG 24 hr tablet, TAKE TWO TABLETS BY MOUTH EVERY DAY, Disp: , Rfl:     nitroglycerin (NITROSTAT) 0 4 mg SL tablet, DISSOLVE 1 TABLET UNDER TONGUE EVERY 5 MINUTES FOR UP TO 3 DOSES AS NEEDED FOR CHEST PAIN, CALL 911 IF PAIN PERSISTS OR UNRELIEVED, Disp: , Rfl:     omeprazole (PriLOSEC) 20 mg delayed release capsule, omeprazole 20 mg capsule,delayed release, Disp: , Rfl:     timolol (BETIMOL) 0 5 % ophthalmic solution, Administer 1 drop to both eyes 2 (two) times a day  , Disp: , Rfl:     Allergies   Allergen Reactions    Acetaminophen Swelling    Aspirin Swelling    Lisinopril Swelling       Physical Exam:   Vitals:    05/03/22 0911   BP: 117/81   Pulse: 105   Resp: 20   Temp: 97 5 °F (36 4 °C)   SpO2: 98%     General: Awake, Alert, Oriented x 3, Mood and affect appropriate  Respiratory: Respirations even and unlabored  Cardiovascular: Peripheral pulses intact; no edema  Musculoskeletal Exam: neck pain    ASA Score: 3    Patient/Chart Verification  ID Band Applied: Yes  Consents Confirmed: To be obtained in the Pre-Procedure area  H&P( within 30 days) Verified: To be obtained in the Pre-Procedure area  Interval H&P(within 24 hr) Complete (required for Outpatients and Surgery Admit only): To be obtained in the Pre-Procedure area  Allergies Reviewed: Yes  Anticoag/NSAID held?: NA (denies celebrex use)  Currently on antibiotics?: No  Pregnancy denied?: NA    Assessment:   1   Cervical radiculopathy        Plan: c7-t1 keila

## 2022-05-10 ENCOUNTER — TELEPHONE (OUTPATIENT)
Dept: PAIN MEDICINE | Facility: CLINIC | Age: 62
End: 2022-05-10

## 2022-08-10 ENCOUNTER — TELEPHONE (OUTPATIENT)
Dept: NEPHROLOGY | Facility: CLINIC | Age: 62
End: 2022-08-10

## 2022-08-10 NOTE — TELEPHONE ENCOUNTER
I called [address] @ [phone] to check eligible for the patient and as of 8/10/2022 the patient has current active coverage as of 1/18/2020  According to Harjinder Espnial () this patient plan requires a [address] referral in order to be seen by a specialist  The reference number for this call is: [id number]   Analilia Urias,

## 2022-08-12 NOTE — PROGRESS NOTES
Pain Medicine Follow-Up Note    Assessment:  1  Cervical spondylosis    2  Lumbar radiculopathy    3  Lumbar facet arthropathy    4  Cervical radiculopathy    5  Myofascial pain syndrome        Plan:  Orders Placed This Encounter   Procedures    Ambulatory referral to Physical Therapy     Standing Status:   Future     Standing Expiration Date:   8/15/2023     Referral Priority:   Routine     Referral Type:   Physical Therapy     Referral Reason:   Specialty Services Required     Referred to Provider:   Marquis Osorio Pt     Requested Specialty:   Physical Therapy     Number of Visits Requested:   1     Expiration Date:   8/15/2023       No orders of the defined types were placed in this encounter  My impressions and treatment recommendations were discussed in detail with the patient who verbalized understanding and had no further questions  This is a 67-year-old female who returns to our office with bilateral neck pain secondary to cervical spondylosis and cervical spinal stenosis  She is had multiple epidural steroid injections with significant relief, however no longer than 2 months max  At this point, will abandon GEOVANNA  She does continue to have notable axial neck pain which we discussed previously, could benefit from MBB/RFA  Information about the procedure was provided today  She does have notable myofascial pain in the bilateral trapezius musculature for which we will order trigger point injection 1st   She would also benefit from physical therapy and referral was provided today  She is currently doing physical therapy for lower back at [facility]  I did also discuss placing the the patient on chronic low-dose opioid regimen given exhaustion of other medication classes and multiple injections  I did discuss this would be a last resort option after she is failed more conservative measures    At this point, patient does not want to go down that route which I am agreeable to    [place] Prescription Drug Monitoring Program report was reviewed and was appropriate     Complete risks and benefits including bleeding, infection, tissue reaction, nerve injury and allergic reaction were discussed  The approach was demonstrated using models and literature was provided  Verbal and written consent was obtained  Discharge instructions were provided  I personally saw and examined the patient and I agree with the above discussed plan of care  History of Present Illness:    Arron Miguel is a 60 y o  female who presents to [facility] for interval re-evaluation of the above stated pain complaints  The patient has a past medical and chronic pain history as outlined in the assessment section  She was last seen on 05/13/2022 for C7-T1 cervical epidural steroid injection with about 80-95% relief for 1 5 months  However symptoms are back to severe, 10/10 pain  Pain is all day  Constant, sharp, pressure-like, pins and needles       She has not had for cervical epidural steroid injections since November 2020 with limited improvement  Current medications from us include Flexeril 10 mg t i d  p r n  with no relief  Gets gabapentin 800 mg t i d  from another provider  Other than as stated above, the patient denies any interval changes in medications, medical condition, mental condition, symptoms, or allergies since the last office visit  Review of Systems:    Review of Systems   Respiratory: Negative for shortness of breath  Cardiovascular: Negative for chest pain  Gastrointestinal: Negative for constipation, diarrhea, nausea and vomiting  Musculoskeletal: Negative for arthralgias, gait problem, joint swelling and myalgias  Skin: Negative for rash  Neurological: Negative for dizziness, seizures and weakness  All other systems reviewed and are negative          Patient Active Problem List   Diagnosis    Stroke [facility])    Essential hypertension    Abdominal pain  Nausea and/or vomiting    Syncope    Stroke-like symptoms    Bradycardia    Stage 3 chronic kidney disease (HCC)    Chronic kidney disease-mineral and bone disorder    Multiple sclerosis (HCC)    Lumbar radiculopathy    [facility]NA [facility]acute [facility]) [facility] )       Past Medical History:   Diagnosis Date    Chronic kidney disease     Glaucoma     Hypertension     Migraine     Multiple sclerosis ([facility] )     Night muscle spasms     Stroke ([facility] )     2 strokes in past,  and        Past Surgical History:   Procedure Laterality Date    ESOPHAGOGASTRODUODENOSCOPY N/A 2016    Procedure: ESOPHAGOGASTRODUODENOSCOPY (EGD); Surgeon: Naz De Santiago MD;  [facility]; Service:     HIP ARTHROPLASTY Bilateral        Family History   Problem Relation Age of Onset    No Known Problems Mother     No Known Problems Father        Social History     Occupational History    Not on file   Tobacco Use    Smoking status: Current Every Day Smoker     Packs/day: 0 25     Years: 10 00     Pack years: 2 50     Types: Cigarettes     Last attempt to quit: 2021     Years since quittin 6    Smokeless tobacco: Never Used    Tobacco comment: 1 or 2 a week   Vaping Use    Vaping Use: Never used   Substance and Sexual Activity    Alcohol use:  Yes    Drug use: No    Sexual activity: Not Currently     Partners: Male         Current Outpatient Medications:     amLODIPine (NORVASC) 10 mg tablet, TAKE ONE TABLET BY MOUTH EVERY MORNING, Disp: , Rfl:     atorvastatin (LIPITOR) 40 mg tablet, , Disp: , Rfl:     Aubagio 14 MG TABS, , Disp: , Rfl:     Cholecalciferol 125 MCG (5000 UT) capsule, 5,000 Units, Disp: , Rfl:     cyclobenzaprine (FLEXERIL) 10 mg tablet, Take 1 tablet (10 mg total) by mouth 3 (three) times a day as needed for muscle spasms, Disp: 60 tablet, Rfl: 0    ferrous sulfate 325 (65 Fe) mg tablet, Take 65 mg by mouth, Disp: , Rfl:     folic acid (FOLVITE) 1 mg tablet, , Disp: , Rfl:    gabapentin (NEURONTIN) 600 MG tablet, 800 mg 3 (three) times a day  , Disp: , Rfl:     hydrALAZINE (APRESOLINE) 50 mg tablet, Take 50 mg by mouth Three times a day, Disp: , Rfl:     latanoprost (XALATAN) 0 005 % ophthalmic solution, Administer 1 drop to both eyes 2 (two) times a day  , Disp: , Rfl:     losartan (COZAAR) 50 mg tablet, Take one tablet by mouth once a day, Disp: , Rfl:     NIFEdipine ER (ADALAT CC) 60 MG 24 hr tablet, TAKE TWO TABLETS BY MOUTH EVERY DAY, Disp: , Rfl:     nitroglycerin (NITROSTAT) 0 4 mg SL tablet, DISSOLVE 1 TABLET UNDER TONGUE EVERY 5 MINUTES FOR UP TO 3 DOSES AS NEEDED FOR CHEST PAIN, CALL 911 IF PAIN PERSISTS OR UNRELIEVED, Disp: , Rfl:     omeprazole (PriLOSEC) 20 mg delayed release capsule, omeprazole 20 mg capsule,delayed release, Disp: , Rfl:     timolol (BETIMOL) 0 5 % ophthalmic solution, Administer 1 drop to both eyes 2 (two) times a day  , Disp: , Rfl:     Allergies   Allergen Reactions    Acetaminophen Swelling    Aspirin Swelling    Lisinopril Swelling       Physical Exam:    /98   Pulse 91   Resp 18   Ht 5' 11" (1 803 m)   Wt 90 6 kg (199 lb 12 8 oz)   BMI 27 87 kg/m²     Constitutional:normal, well developed, well nourished, alert, in no distress and non-toxic and no overt pain behavior    Eyes:anicteric  HEENT:grossly intact  Neck:supple, symmetric, trachea midline and no masses   Pulmonary:even and unlabored  Cardiovascular:No edema or pitting edema present  Skin:Normal without rashes or lesions and well hydrated  Psychiatric:Mood and affect appropriate  Neurologic:Cranial Nerves II-XII grossly intact  Musculoskeletal:normal      Imaging  No orders to display         Orders Placed This Encounter   Procedures    Ambulatory referral to Physical Therapy normal...

## 2022-08-15 ENCOUNTER — OFFICE VISIT (OUTPATIENT)
Dept: PAIN MEDICINE | Facility: CLINIC | Age: 62
End: 2022-08-15
Payer: COMMERCIAL

## 2022-08-15 ENCOUNTER — TELEPHONE (OUTPATIENT)
Dept: PAIN MEDICINE | Facility: CLINIC | Age: 62
End: 2022-08-15

## 2022-08-15 VITALS
BODY MASS INDEX: 27.97 KG/M2 | HEIGHT: 71 IN | HEART RATE: 91 BPM | RESPIRATION RATE: 18 BRPM | WEIGHT: 199.8 LBS | DIASTOLIC BLOOD PRESSURE: 98 MMHG | SYSTOLIC BLOOD PRESSURE: 141 MMHG

## 2022-08-15 DIAGNOSIS — M54.12 CERVICAL RADICULOPATHY: ICD-10-CM

## 2022-08-15 DIAGNOSIS — M79.18 MYOFASCIAL PAIN SYNDROME: ICD-10-CM

## 2022-08-15 DIAGNOSIS — M47.816 LUMBAR FACET ARTHROPATHY: ICD-10-CM

## 2022-08-15 DIAGNOSIS — M54.16 LUMBAR RADICULOPATHY: ICD-10-CM

## 2022-08-15 DIAGNOSIS — M47.812 CERVICAL SPONDYLOSIS: Primary | ICD-10-CM

## 2022-08-15 PROCEDURE — 99214 OFFICE O/P EST MOD 30 MIN: CPT | Performed by: STUDENT IN AN ORGANIZED HEALTH CARE EDUCATION/TRAINING PROGRAM

## 2022-08-15 NOTE — TELEPHONE ENCOUNTER
Pt called to confirm appmt time and stated that she may be 5 mins late- informed pt that her appmt is for 9:45 so she will be on time and also advised that if she arrives past 9:45, theres no guarantee that she will be seen- pt is ok

## 2022-08-15 NOTE — PATIENT INSTRUCTIONS
Trigger Point Injection   AMBULATORY CARE:   A trigger point injection  is used to relax a muscle knot  This helps relieve pain  You may be able to have more than one trigger point treated during a session  How to prepare for a trigger point injection:   Your healthcare provider will tell you how to prepare  Arrange to have someone drive you home after the injection  Tell your provider about all medicines you take, including pain medicine, blood thinners, and muscle relaxers  He or she will tell you if you need to stop any medicine for the injection, and when to stop  He or she will tell you which medicines to take or not take on the day of the injection  Tell your provider about all your allergies, including to any pain medicine  What will happen during a trigger point injection:   You may be sitting or lying, depending on where the trigger point is located  Your healthcare provider will feel for a knot in the muscle  He or she may nish your skin over the knot  Your provider will put a needle through your skin and into the trigger point  Saline (salt solution), pain relievers, or other medicines may be pushed through the needle into the trigger point  Your provider may use only a dry needle (no medicine)  He or she will pull the needle almost all the way out and then push it in again  He or she will repeat this several times until the muscle stops twitching or feels relaxed  Your provider will remove the needle and stretch the muscle area  He or she may apply pressure to the area for 2 minutes  A bandage will be put over the injection site to prevent bleeding or an infection  What to expect after a trigger point injection:   You may feel pain relief right away  It is normal for some pain to start again 2 hours later  An ice pack or over-the-counter pain medicine can help lower the pain  You may feel sore in the injection site for a few days   If you need another injection in the same area, wait until the area is not sore  Your healthcare provider may give you specific activity instructions to follow at home or recommend physical therapy  In general, you should try to stay active  Avoid strenuous activity for the first 3 or 4 days after the injection  Do not have more injections if you still have trigger point pain after 2 or 3 injections  Risks of a trigger point injection:  You may have a severe allergic reaction to pain medicine injected  The injection may be painful, or you may be sore where you got the injection  You may bleed, bruise, or develop an infection in the injection area  The injection may cause you to feel faint  Rarely, the needle may cause muscle or blood vessel damage or your lung may collapse if you get the injection near your chest   Call your local emergency number (911 in the [address] [place] [address],[address]), or have someone call if:   Your mouth and throat are swollen  You are wheezing or have trouble breathing  You have chest pain or your heart is beating faster than usual     You feel like you are going to faint  When should I seek immediate care? Your face is red or swollen  You have hives that spread over your body  You feel weak or dizzy  Call your doctor or pain specialist if:   You have a fever within 1 week of the injection  You have redness or swelling within 1 week of the injection  You have new or worsening pain near the injection site  You have questions or concerns about your condition or care  Self-care:   Stay active after you have trigger point injections  Gently move your joints through their full range of motion during the first week  Avoid strenuous activity for 3 or 4 days  Do regular stretches of the trigger point muscle  Place gentle pressure on the trigger point, and then stretch the muscle  Ask your healthcare provider for more information about how to stretch and apply pressure  Apply ice to the injection site    Use an ice pack, or put ice in a plastic bag  Cover the bag with a towel before you apply it  Apply ice for 15 to 20 minutes every hour, or as directed  Apply heat to trigger point sites  Heat can help relax muscles and relieve trigger point pain  Use a heat pack or a heating pad set on low  Apply heat for 15 minutes every hour, or as directed  Follow up with your doctor or pain specialist as directed:  Write down your questions so you remember to ask them during your visits  © Copyright [employer] 2022 Information is for End User's use only and may not be sold, redistributed or otherwise used for commercial purposes  All illustrations and images included in CareNotes® are the copyrighted property of A D A M , Inc  or [address] [place], [place] [address]sukumar   The above information is an  only  It is not intended as medical advice for individual conditions or treatments  Talk to your doctor, nurse or pharmacist before following any medical regimen to see if it is safe and effective for you

## 2022-08-22 ENCOUNTER — TELEPHONE (OUTPATIENT)
Dept: NEPHROLOGY | Facility: CLINIC | Age: 62
End: 2022-08-22

## 2022-08-22 NOTE — TELEPHONE ENCOUNTER
Called spoke with patient advised patient to get labs done prior to 8/29 Appt, patient understood and is okay with it

## 2022-08-23 ENCOUNTER — TELEPHONE (OUTPATIENT)
Dept: NEPHROLOGY | Facility: CLINIC | Age: 62
End: 2022-08-23

## 2022-08-23 NOTE — TELEPHONE ENCOUNTER
[facility] @ [phone] and spoke to Precious () to check eligible for the patient and as of 8/23/2022)  the patient has current active coverage as of 1/18/2022  According to Precious () this patient plan requires a [facility] referral from Dr Humphrey Nyhan PCP on file in order to be seen by a specialist  The reference number for this call is: [id number]    David Livingston,

## 2022-08-29 ENCOUNTER — OFFICE VISIT (OUTPATIENT)
Dept: NEPHROLOGY | Facility: CLINIC | Age: 62
End: 2022-08-29
Payer: COMMERCIAL

## 2022-08-29 VITALS
OXYGEN SATURATION: 97 % | SYSTOLIC BLOOD PRESSURE: 120 MMHG | HEIGHT: 71 IN | RESPIRATION RATE: 16 BRPM | BODY MASS INDEX: 27.69 KG/M2 | DIASTOLIC BLOOD PRESSURE: 80 MMHG | TEMPERATURE: 96.8 F | HEART RATE: 89 BPM | WEIGHT: 197.8 LBS

## 2022-08-29 DIAGNOSIS — E83.9 CHRONIC KIDNEY DISEASE-MINERAL AND BONE DISORDER: ICD-10-CM

## 2022-08-29 DIAGNOSIS — M89.9 CHRONIC KIDNEY DISEASE-MINERAL AND BONE DISORDER: ICD-10-CM

## 2022-08-29 DIAGNOSIS — N18.9 CHRONIC KIDNEY DISEASE-MINERAL AND BONE DISORDER: ICD-10-CM

## 2022-08-29 DIAGNOSIS — I10 ESSENTIAL HYPERTENSION: Chronic | ICD-10-CM

## 2022-08-29 DIAGNOSIS — N18.30 STAGE 3 CHRONIC KIDNEY DISEASE, UNSPECIFIED WHETHER STAGE 3A OR 3B CKD (HCC): Primary | ICD-10-CM

## 2022-08-29 DIAGNOSIS — N17.9 AKI (ACUTE KIDNEY INJURY) (HCC): ICD-10-CM

## 2022-08-29 DIAGNOSIS — G35 MULTIPLE SCLEROSIS (HCC): ICD-10-CM

## 2022-08-29 PROCEDURE — 99214 OFFICE O/P EST MOD 30 MIN: CPT | Performed by: INTERNAL MEDICINE

## 2022-08-29 NOTE — PROGRESS NOTES
NEPHROLOGY OFFICE FOLLOW UP  Wellington Deras 60 y o  female MRN: [id number]    Encounter: [id number] 8/29/2022    REASON FOR VISIT: Wellington Deras is a 60 y o  female who is here on 8/29/2022 for Chronic Kidney Disease and Follow-up    HPI:    Guru Negrete came in today for follow-up of CKD  [de-identified] year woman with history of diabetes hypertension along with multiple sclerosis and lumbar radiculopathy    Overall seems to doing well being monitored by pain specialist     No chest pain no palpitation or shortness of breath     No nausea no vomiting     Denies any urinary complaint      REVIEW OF SYSTEMS:    Review of Systems   Constitutional: Negative for activity change and fatigue  HENT: Negative for congestion and ear discharge  Eyes: Negative for photophobia and pain  Respiratory: Negative for apnea and choking  Cardiovascular: Negative for chest pain and palpitations  Gastrointestinal: Negative for abdominal distention and blood in stool  Endocrine: Negative for heat intolerance and polyphagia  Genitourinary: Negative for flank pain and urgency  Musculoskeletal: Positive for arthralgias and back pain  Negative for neck pain and neck stiffness  Skin: Negative for color change and wound  Allergic/Immunologic: Negative for food allergies and immunocompromised state  Neurological: Negative for seizures and facial asymmetry  Hematological: Negative for adenopathy  Does not bruise/bleed easily  Psychiatric/Behavioral: Negative for self-injury and suicidal ideas  PAST MEDICAL HISTORY:  Past Medical History:   Diagnosis Date    Chronic kidney disease     Glaucoma     Hypertension     Migraine     Multiple sclerosis ([facility] )     Night muscle spasms     [facility] )     2 strokes in past, 2011 and 2013       PAST SURGICAL HISTORY:  Past Surgical History:   Procedure Laterality Date    ESOPHAGOGASTRODUODENOSCOPY N/A 8/9/2016    Procedure: ESOPHAGOGASTRODUODENOSCOPY (EGD);   Surgeon: Maryam Pacheco Syed Archibald MD;  [facility];   Service:     HIP ARTHROPLASTY Bilateral        SOCIAL HISTORY:  Social History     Substance and Sexual Activity   Alcohol Use Yes     Social History     Substance and Sexual Activity   Drug Use No     Social History     Tobacco Use   Smoking Status Current Every Day Smoker    Packs/day: 0 25    Years: 10 00    Pack years: 2 50    Types: Cigarettes    Last attempt to quit: 2021    Years since quittin 6   Smokeless Tobacco Never Used   Tobacco Comment    1 or 2 a week       FAMILY HISTORY:  Family History   Problem Relation Age of Onset    No Known Problems Mother     No Known Problems Father        MEDICATIONS:    Current Outpatient Medications:     amLODIPine (NORVASC) 10 mg tablet, TAKE ONE TABLET BY MOUTH EVERY MORNING, Disp: , Rfl:     atorvastatin (LIPITOR) 40 mg tablet, , Disp: , Rfl:     Aubagio 14 MG TABS, , Disp: , Rfl:     Cholecalciferol 125 MCG (5000 UT) capsule, 5,000 Units, Disp: , Rfl:     cyclobenzaprine (FLEXERIL) 10 mg tablet, Take 1 tablet (10 mg total) by mouth 3 (three) times a day as needed for muscle spasms, Disp: 60 tablet, Rfl: 0    ferrous sulfate 325 (65 Fe) mg tablet, Take 65 mg by mouth, Disp: , Rfl:     folic acid (FOLVITE) 1 mg tablet, , Disp: , Rfl:     gabapentin (NEURONTIN) 600 MG tablet, 800 mg 4 (four) times a day, Disp: , Rfl:     hydrALAZINE (APRESOLINE) 50 mg tablet, Take 50 mg by mouth Three times a day, Disp: , Rfl:     latanoprost (XALATAN) 0 005 % ophthalmic solution, Administer 1 drop to both eyes daily, Disp: , Rfl:     losartan (COZAAR) 50 mg tablet, Take one tablet by mouth once a day, Disp: , Rfl:     NIFEdipine ER (ADALAT CC) 60 MG 24 hr tablet, TAKE TWO TABLETS BY MOUTH EVERY DAY, Disp: , Rfl:     nitroglycerin (NITROSTAT) 0 4 mg SL tablet, DISSOLVE 1 TABLET UNDER TONGUE EVERY 5 MINUTES FOR UP TO 3 DOSES AS NEEDED FOR CHEST PAIN, CALL 911 IF PAIN PERSISTS OR UNRELIEVED, Disp: , Rfl:     omeprazole (PriLOSEC) 20 mg delayed release capsule, omeprazole 20 mg capsule,delayed release, Disp: , Rfl:     timolol (BETIMOL) 0 5 % ophthalmic solution, Administer 1 drop to both eyes 2 (two) times a day  , Disp: , Rfl:     PHYSICAL EXAM:  Vitals:    08/29/22 1114   BP: 120/80   BP Location: Right arm   Patient Position: Sitting   Pulse: 89   Resp: 16   Temp: (!) 96 8 °F (36 °C)   TempSrc: Temporal   SpO2: 97%   Weight: 89 7 kg (197 lb 12 8 oz)   Height: 5' 11" (1 803 m)     Body mass index is 27 59 kg/m²  Physical Exam  Constitutional:       General: She is not in acute distress  Appearance: She is well-developed  HENT:      Head: Normocephalic  Eyes:      General: No scleral icterus  Conjunctiva/sclera: Conjunctivae normal    Neck:      Vascular: No JVD  Cardiovascular:      Rate and Rhythm: Normal rate  Heart sounds: Normal heart sounds  Pulmonary:      Effort: Pulmonary effort is normal       Breath sounds: No wheezing  Abdominal:      Palpations: Abdomen is soft  Tenderness: There is no abdominal tenderness  Musculoskeletal:         General: Normal range of motion  Cervical back: Neck supple  Skin:     General: Skin is warm  Findings: No rash  Neurological:      Mental Status: She is alert and oriented to person, place, and time  Psychiatric:         Behavior: Behavior normal          LAB RESULTS:  Results for orders placed or performed in visit on 03/08/22   Protein / creatinine ratio, urine   Result Value Ref Range    PROTEIN UA 19 1     EXT Creatinine Urine 117 0     EXTERNAL Ur Prot/Creat Ratio 0 16        ASSESSMENT and PLAN:      Stage 3 chronic kidney disease ([facility] )  Lab Results   Component Value Date    EGFR 53 0 08/10/2016    EGFR 53 5 08/10/2016    EGFR 55 6 08/08/2016    CREATININE 1 26 08/10/2016    CREATININE 1 25 08/10/2016    CREATININE 1 21 08/08/2016   Patient renal function is stable overall the creatinine 1 4 and GFR range of 53    Asymptomatic and progressive nature of kidney disease discussed with the patient  Advised hydration and avoiding any nephrotoxic medication    Chronic kidney disease-mineral and bone disorder  Lab Results   Component Value Date    EGFR 53 0 08/10/2016    EGFR 53 5 08/10/2016    EGFR 55 6 08/08/2016    CREATININE 1 26 08/10/2016    CREATININE 1 25 08/10/2016    CREATININE 1 21 08/08/2016   PTH and phosphorus along with vitamin-D are within acceptable range and will continue to monitor    Multiple sclerosis ([address] )  On medication and stable being monitored by neurologist    Essential hypertension  Blood pressure is very well control        Everything discussed at length with the patient  I will see her back in 6 months will get blood and urine test before that visit      Portions of the record may have been created with voice recognition software  Occasional wrong word or "sound a like" substitutions may have occurred due to the inherent limitations of voice recognition software  Read the chart carefully and recognize, using context, where substitutions have occurred  If you have any questions, please contact the dictating provider

## 2022-08-29 NOTE — ASSESSMENT & PLAN NOTE
Lab Results   Component Value Date    EGFR 53 0 08/10/2016    EGFR 53 5 08/10/2016    EGFR 55 6 08/08/2016    CREATININE 1 26 08/10/2016    CREATININE 1 25 08/10/2016    CREATININE 1 21 08/08/2016   Patient renal function is stable overall the creatinine 1 4 and GFR range of 53  Asymptomatic and progressive nature of kidney disease discussed with the patient    Advised hydration and avoiding any nephrotoxic medication

## 2022-10-13 ENCOUNTER — OFFICE VISIT (OUTPATIENT)
Dept: PAIN MEDICINE | Facility: CLINIC | Age: 62
End: 2022-10-13
Payer: COMMERCIAL

## 2022-10-13 VITALS
DIASTOLIC BLOOD PRESSURE: 71 MMHG | BODY MASS INDEX: 28.59 KG/M2 | HEART RATE: 87 BPM | WEIGHT: 205 LBS | SYSTOLIC BLOOD PRESSURE: 117 MMHG

## 2022-10-13 DIAGNOSIS — M79.18 MYOFASCIAL PAIN SYNDROME: ICD-10-CM

## 2022-10-13 DIAGNOSIS — M47.812 CERVICAL SPONDYLOSIS: Primary | ICD-10-CM

## 2022-10-13 DIAGNOSIS — M54.12 CERVICAL RADICULOPATHY: ICD-10-CM

## 2022-10-13 PROCEDURE — 20552 NJX 1/MLT TRIGGER POINT 1/2: CPT | Performed by: STUDENT IN AN ORGANIZED HEALTH CARE EDUCATION/TRAINING PROGRAM

## 2022-10-13 PROCEDURE — 99214 OFFICE O/P EST MOD 30 MIN: CPT | Performed by: STUDENT IN AN ORGANIZED HEALTH CARE EDUCATION/TRAINING PROGRAM

## 2022-10-13 NOTE — PROGRESS NOTES
Pain Medicine Follow-Up Note    Assessment:  1  Cervical spondylosis    2  Cervical radiculopathy    3  Myofascial pain syndrome        Plan:  Orders Placed This Encounter   Procedures   • Inj Trigger Point Single/Multiple     This order was created via procedure documentation   • FL spine and pain procedure     Standing Status:   Future     Number of Occurrences:   1     Standing Expiration Date:   10/13/2026     Order Specific Question:   Reason for Exam:     Answer:   TPI trap LEFT SPLENIUS CAPITIS AND LEFT TRAPEZIUS     Order Specific Question:   Anticoagulant hold needed? Answer:   No       New Medications Ordered This Visit   Medications   • bupivacaine (PF) (MARCAINE) 0 25 % injection 3 5 mL   • methylPREDNISolone acetate (DEPO-MEDROL) injection 40 mg       My impressions and treatment recommendations were discussed in detail with the patient who verbalized understanding and had no further questions  This is a 79-year-old female who returns to our office with left neck and left trapezius pain that is ongoing  She underwent 6 weeks of physical therapy without significant improvement in her condition  She is scheduled to have rotator cuff surgery on the left shoulder next week  She continues to have notable pain secondary to cervical spondylosis as well as myofascial pain with notable trigger points in the left trapezius and left splenius capitis musculature  We perform trigger point injection in the office to help with her myofascial complaints  In the future, when she is recovered from surgery, may consider MBB/RFA of the left cervical spine given he would from spondylosis  Universal Protocol:  Consent: Verbal consent obtained  Written consent obtained    Risks and benefits: risks, benefits and alternatives were discussed  Consent given by: patient  Time out: Immediately prior to procedure a "time out" was called to verify the correct patient, procedure, equipment, support staff and site/side marked as required  Timeout called at: 10/13/2022 8:30 AM   Patient understanding: patient states understanding of the procedure being performed  Patient consent: the patient's understanding of the procedure matches consent given  Procedure consent: procedure consent matches procedure scheduled  Relevant documents: relevant documents present and verified  Test results: test results available and properly labeled  Site marked: the operative site was marked  Radiology Images displayed and confirmed  If images not available, report reviewed: imaging studies available  Required items: required blood products, implants, devices, and special equipment available  Patient identity confirmed: verbally with patient    Supporting Documentation  Indications: pain   Procedure Details  Location(s):  Additional procedure details: PROCEDURE:  Trigger point injection x 4 with local anesthetic and steroid in the left trapezius and left splenius capitis muscle groups  ATTENDING PHYSICIAN:  LENY Reyez  PREPROCEDURE DIAGNOSIS:  Myofascial pain with identifiable trigger points  POSTPROCEDURE DIAGNOSIS:  Myofascial pain with identifiable trigger points  ANESTHESIA:  Local    ESTIMATED BLOOD LOSS:  Minimal    COMPLICATIONS:  None      CONSENT:  Today's procedure, its potential benefits as well as its risks and potential side effects were reviewed  Discussed risks of the procedure include bleeding, infection, nerve irritation or damage, reactions to the medications, failure of the pain to improve and exacerbation of the pain were explained to the patient, who verbalized understanding and who wished to proceed  Informed consent was signed  DESCRIPTION OF THE PROCEDURE:  After informed consent was obtained, the patient was placed in the seated position  4 trigger points were identified via palpation and marked with a surgical skin marker  The skin was prepped with antiseptic in the usual sterile fashion    Strict aseptic technique was utilized throughout the procedure  A 25 gauge, 1 ½ inch needle was then advanced into each identified trigger point  An injectate consisting of 4 5 ml of 0 25% marcaine with 0 5 mL of 80mg/mL depo-medrol was slowly injected in divided doses after negative aspiration  The needle was removed with tip intact  The patient tolerated the procedure and hemostasis was maintained  There were no apparent paresthesias or complications  The skin was wiped clean, and a band-aid was placed as appropriate  The patient was monitored for an appropriate period of time following the procedure and remained hemodynamically stable and neurovascularly intact  The patient was ultimately discharged to home with supervision in good condition and instructed to call the office to report the response  Complete risks and benefits including bleeding, infection, tissue reaction, nerve injury and allergic reaction were discussed  The approach was demonstrated using models and literature was provided  Verbal and written consent was obtained  Discharge instructions were provided  I personally saw and examined the patient and I agree with the above discussed plan of care  History of Present Illness:    Sheba Peck is a 60 y o  female who presents to [facility] for interval re-evaluation of the above stated pain complaints  The patient has a past medical and chronic pain history as outlined in the assessment section  She was last seen on 08/15/2022 at which time she was seen for neck pain related to cervical spondylosis and cervical spinal stenosis  Was given referral to physical therapy at the time  She has not been dismissed from PT  Continues to have notable left-sided neck and shoulder pain  She is going to have rotator cuff repairs soon for the left shoulder  Pain score is 9/10  Worse in the morning, evening, nighttime    Pain is constant, throbbing, and pins and needles       Current medications include Flexeril 10 mg t i d  p r n  Received gabapentin 8 0 mg t i d  from another provider  Since November 2020, patient has had for cervical epidural steroid injections with notable relief of 85-90%, but only for 2 months or so  We did discuss TPI and MBB/RFA at her last appointment  Other than as stated above, the patient denies any interval changes in medications, medical condition, mental condition, symptoms, or allergies since the last office visit  Review of Systems:    Review of Systems   Respiratory: Positive for shortness of breath  Cardiovascular: Negative for chest pain  Gastrointestinal: Negative for constipation, diarrhea, nausea and vomiting  Musculoskeletal: Positive for myalgias  Negative for arthralgias, gait problem and joint swelling  Skin: Negative for rash  Neurological: Positive for dizziness  Negative for seizures and weakness  All other systems reviewed and are negative  Patient Active Problem List   Diagnosis   • Stroke [facility])   • Essential hypertension   • Abdominal pain   • Nausea and/or vomiting   • Syncope   • Stroke-like symptoms   • Bradycardia   • Stage 3 chronic kidney disease ([facility] [address] )   • Chronic kidney disease-mineral and bone disorder   • Multiple sclerosis ([facility] [address] )   • Lumbar radiculopathy   • JOVANA (acute kidney injury) ([facility] [address] )       Past Medical History:   Diagnosis Date   • Chronic kidney disease    • Glaucoma    • Hypertension    • Migraine    • Multiple sclerosis ([facility] [address] )    • Night muscle spasms    • Stroke ([facility] [address] )     2 strokes in past, 2011 and 2013       Past Surgical History:   Procedure Laterality Date   • ESOPHAGOGASTRODUODENOSCOPY N/A 8/9/2016    Procedure: ESOPHAGOGASTRODUODENOSCOPY (EGD); Surgeon: Peggy Kwan MD;  Location: [facility];   Service:    • HIP ARTHROPLASTY Bilateral        Family History   Problem Relation Age of Onset   • No Known Problems Mother    • No Known Problems Father        Social History     Occupational History   • Not on file   Tobacco Use   • Smoking status: Current Every Day Smoker     Packs/day: 0 25     Years: 10 00     Pack years: 2 50     Types: Cigarettes     Last attempt to quit: 2021     Years since quittin 7   • Smokeless tobacco: Never Used   • Tobacco comment: 1 or 2 a week   Vaping Use   • Vaping Use: Never used   Substance and Sexual Activity   • Alcohol use:  Yes   • Drug use: No   • Sexual activity: Not Currently     Partners: Male         Current Outpatient Medications:   •  atorvastatin (LIPITOR) 40 mg tablet, , Disp: , Rfl:   •  Aubagio 14 MG TABS, , Disp: , Rfl:   •  Cholecalciferol 125 MCG (5000 UT) capsule, 5,000 Units, Disp: , Rfl:   •  cyclobenzaprine (FLEXERIL) 10 mg tablet, Take 1 tablet (10 mg total) by mouth 3 (three) times a day as needed for muscle spasms, Disp: 60 tablet, Rfl: 0  •  ferrous sulfate 325 (65 Fe) mg tablet, Take 65 mg by mouth, Disp: , Rfl:   •  folic acid (FOLVITE) 1 mg tablet, , Disp: , Rfl:   •  gabapentin (NEURONTIN) 600 MG tablet, 800 mg 4 (four) times a day, Disp: , Rfl:   •  hydrALAZINE (APRESOLINE) 50 mg tablet, Take 50 mg by mouth Three times a day, Disp: , Rfl:   •  latanoprost (XALATAN) 0 005 % ophthalmic solution, Administer 1 drop to both eyes daily, Disp: , Rfl:   •  losartan (COZAAR) 50 mg tablet, Take one tablet by mouth once a day, Disp: , Rfl:   •  NIFEdipine ER (ADALAT CC) 60 MG 24 hr tablet, TAKE TWO TABLETS BY MOUTH EVERY DAY, Disp: , Rfl:   •  nitroglycerin (NITROSTAT) 0 4 mg SL tablet, PRN, Disp: , Rfl:   •  omeprazole (PriLOSEC) 20 mg delayed release capsule, omeprazole 20 mg capsule,delayed release, Disp: , Rfl:   •  timolol (BETIMOL) 0 5 % ophthalmic solution, Administer 1 drop to both eyes 2 (two) times a day  , Disp: , Rfl:   •  amLODIPine (NORVASC) 10 mg tablet, TAKE ONE TABLET BY MOUTH EVERY MORNING (Patient not taking: Reported on 10/13/2022), Disp: , Rfl:   No current facility-administered medications for this visit  Allergies   Allergen Reactions   • Acetaminophen Swelling   • Aspirin Swelling   • Lisinopril Swelling       Physical Exam:    /71 (BP Location: Left arm, Patient Position: Sitting, Cuff Size: Standard)   Pulse 87   Wt 93 kg (205 lb)   BMI 28 59 kg/m²     Constitutional:normal, well developed, well nourished, alert, in no distress and non-toxic and no overt pain behavior    Eyes:anicteric  HEENT:grossly intact  Neck:supple, symmetric, trachea midline and no masses   Pulmonary:even and unlabored  Cardiovascular:No edema or pitting edema present  Skin:Normal without rashes or lesions and well hydrated  Psychiatric:Mood and affect appropriate  Neurologic:Cranial Nerves II-XII grossly intact  Musculoskeletal:  Trigger points left trapezius and left splenius capitis      Imaging  FL spine and pain procedure    (Results Pending)         Orders Placed This Encounter   Procedures   • Inj Trigger Point Single/Multiple   • FL spine and pain procedure

## 2022-10-14 RX ORDER — BUPIVACAINE HYDROCHLORIDE 2.5 MG/ML
3.5 INJECTION, SOLUTION EPIDURAL; INFILTRATION; INTRACAUDAL ONCE
Status: COMPLETED | OUTPATIENT
Start: 2022-10-14 | End: 2022-10-14

## 2022-10-14 RX ORDER — METHYLPREDNISOLONE ACETATE 80 MG/ML
40 INJECTION, SUSPENSION INTRA-ARTICULAR; INTRALESIONAL; INTRAMUSCULAR; SOFT TISSUE ONCE
Status: COMPLETED | OUTPATIENT
Start: 2022-10-14 | End: 2022-10-14

## 2022-10-14 RX ADMIN — METHYLPREDNISOLONE ACETATE 40 MG: 80 INJECTION, SUSPENSION INTRA-ARTICULAR; INTRALESIONAL; INTRAMUSCULAR; SOFT TISSUE at 14:18

## 2022-10-14 RX ADMIN — BUPIVACAINE HYDROCHLORIDE 3.5 ML: 2.5 INJECTION, SOLUTION EPIDURAL; INFILTRATION; INTRACAUDAL at 14:19

## 2022-10-19 ENCOUNTER — CONSULT (OUTPATIENT)
Dept: NEUROLOGY | Facility: CLINIC | Age: 62
End: 2022-10-19
Payer: COMMERCIAL

## 2022-10-19 ENCOUNTER — TELEPHONE (OUTPATIENT)
Dept: NEUROLOGY | Facility: CLINIC | Age: 62
End: 2022-10-19

## 2022-10-19 VITALS
DIASTOLIC BLOOD PRESSURE: 88 MMHG | WEIGHT: 203 LBS | SYSTOLIC BLOOD PRESSURE: 130 MMHG | BODY MASS INDEX: 28.42 KG/M2 | HEART RATE: 79 BPM | HEIGHT: 71 IN

## 2022-10-19 DIAGNOSIS — G35 MULTIPLE SCLEROSIS (HCC): ICD-10-CM

## 2022-10-19 DIAGNOSIS — F40.240 CLAUSTROPHOBIA: Primary | ICD-10-CM

## 2022-10-19 PROCEDURE — 99245 OFF/OP CONSLTJ NEW/EST HI 55: CPT | Performed by: PSYCHIATRY & NEUROLOGY

## 2022-10-19 RX ORDER — LORAZEPAM 1 MG/1
TABLET ORAL
Qty: 3 TABLET | Refills: 1 | Status: SHIPPED | OUTPATIENT
Start: 2022-10-19

## 2022-10-19 NOTE — PROGRESS NOTES
Ching Austin is a 60 y o  female  presents today with history of MS    Assessment:  1  Multiple sclerosis ([facility] [address] )        Plan:  MRI brain, cervical and thoracic regions with without contrast   Continue Aubagio   Follow-up afterwards    Discussion:  Ric Melo was diagnosed with MS at 2016 after presenting with slurred speech blurred vision and left arm weakness  Imaging studies were consistent with MS with respect to distribution of T2 lesions, however no enhancement was noted  Lumbar puncture was not performed  She remains stable on Aubagio with the exception of some transient neurologic symptoms in August of 2021 which were attributed to possible MS attack, however only CT of the head was done which was normal, MRI was not repeated  She has no localizing findings on today's exam   I have encouraged her to stop smoking  Will have her continue with Aubagio for now  Will obtain MRI brain cervical and thoracic spines with without contrast   Consider at some point moving forward to check lumbar puncture  I will see her back in follow-up in a few months  Subjective:    HPI  Ric Melo is a right-handed woman who presents with the above complaints  She  Presented with symptom of blurred vision, speech difficulty, gait difficulty and left-sided weakness  She had a brain MRI done with without contrast that demonstrated T2 high signal regions in a distribution consistent with MS in the supratentorial and infratentorial regions without enhancement an MRI of the cervical spine demonstrating spondylitic changes with canal narrowing without lesions/enhancement  Blood work was unremarkable  Lumbar puncture was never obtained  She was placed on Aubagio  These studies were all performed at [facility]  Subsequent to that she continue to follow-up with [facility] approximately annually  Follow-up imaging studies were performed at [facility]    In 2018 a brain MRI at [facility] describes similar findings with the distribution of white matter changes consistent with MS in the supratentorial infant to her regions  Cervical MRI in 2018 describe some motion artifact but suggests possible scattered demyelinating lesions within the spinal cord without enhancement  Brain MRI performed in June of 2019 describes similar demyelinating type changes without change in size or number without enhancement  MRI of the cervical spine performed in October of 2020 was done without contrast and makes no mention of any spinal cord lesions at all  In May of 2021 she had an MRI of the brain done with without contrast that again demonstrated no significant change in lesions size or burden without enhancement  She presented the emergency room in August of 2021 with symptoms of facial droop, speech and left-sided weakness predominantly in the arm  Initial concern was possible CVA, however CT scan failed to demonstrate any significant abnormalities and it was felt this could possibly be related to an exacerbation of MS although brain MRI was never done  Since that time she has had no new neurologic symptoms  She does smoke a couple of cigarettes a day is trying to quit  She denies any history of similar neurologic symptoms prior to 2016  She reports that she is having left shoulder replacement surgery tomorrow    She also reports that she has a problem with her left knee was was exacerbated getting out of the car today      Past Medical History:   Diagnosis Date   • Chronic kidney disease    • Glaucoma    • Hypertension    • Migraine    • Multiple sclerosis ([address] )    • Night muscle spasms    • Stroke ([address] )     2 strokes in past, 2011 and 2013       Family History:  Family History   Problem Relation Age of Onset   • No Known Problems Mother    • No Known Problems Father        Past Surgical History:  Past Surgical History:   Procedure Laterality Date   • ESOPHAGOGASTRODUODENOSCOPY N/A 8/9/2016    Procedure: ESOPHAGOGASTRODUODENOSCOPY (EGD); Surgeon: Junior Carlin MD;  Location: [facility]; Service:    • HIP ARTHROPLASTY Bilateral        Social History:   reports that she has been smoking cigarettes  She has a 2 50 pack-year smoking history  She has never used smokeless tobacco  She reports current alcohol use  She reports that she does not use drugs      Allergies:  Acetaminophen, Aspirin, and Lisinopril      Current Outpatient Medications:   •  atorvastatin (LIPITOR) 40 mg tablet, , Disp: , Rfl:   •  Aubagio 14 MG TABS, in the morning, Disp: , Rfl:   •  Cholecalciferol 125 MCG (5000 UT) capsule, 5,000 Units, Disp: , Rfl:   •  cyclobenzaprine (FLEXERIL) 10 mg tablet, Take 1 tablet (10 mg total) by mouth 3 (three) times a day as needed for muscle spasms, Disp: 60 tablet, Rfl: 0  •  ferrous sulfate 325 (65 Fe) mg tablet, Take 65 mg by mouth, Disp: , Rfl:   •  folic acid (FOLVITE) 1 mg tablet, , Disp: , Rfl:   •  gabapentin (NEURONTIN) 600 MG tablet, 800 mg 4 (four) times a day, Disp: , Rfl:   •  hydrALAZINE (APRESOLINE) 50 mg tablet, Take 50 mg by mouth Three times a day, Disp: , Rfl:   •  latanoprost (XALATAN) 0 005 % ophthalmic solution, Administer 1 drop to both eyes daily, Disp: , Rfl:   •  losartan (COZAAR) 50 mg tablet, Take one tablet by mouth once a day, Disp: , Rfl:   •  NIFEdipine ER (ADALAT CC) 60 MG 24 hr tablet, TAKE TWO TABLETS BY MOUTH EVERY DAY, Disp: , Rfl:   •  nitroglycerin (NITROSTAT) 0 4 mg SL tablet, PRN, Disp: , Rfl:   •  omeprazole (PriLOSEC) 20 mg delayed release capsule, omeprazole 20 mg capsule,delayed release, Disp: , Rfl:   •  timolol (BETIMOL) 0 5 % ophthalmic solution, Administer 1 drop to both eyes 2 (two) times a day  , Disp: , Rfl:   •  amLODIPine (NORVASC) 10 mg tablet, TAKE ONE TABLET BY MOUTH EVERY MORNING (Patient not taking: No sig reported), Disp: , Rfl:     I have reviewed the past medical, social and family history, current medications, allergies, vitals, review of systems and updated this information as appropriate today     Objective:    Vitals:  Blood pressure 130/88, height 5' 11" (1 803 m), weight 92 1 kg (203 lb)  Physical Exam    Neurological Exam    GENERAL:  Cooperative in no acute distress  Well-developed and well-nourished    HEAD and NECK   Head is atraumatic normocephalic with no lesions or masses  Neck is supple with full range of motion    CARDIOVASCULAR  Carotid Arteries-no carotid bruits  NEUROLOGIC:  Mental Status-the patient is awake alert and oriented without aphasia or apraxia  Cranial Nerves: Visual fields are full to confrontation  Discs are difficult to visualize on the right and appear flat without significant pallor on the left  Extraocular movements reveal a right exotropia without nystagmus  Pupils are 2-1/2 mm and reactive with negative Rajesh [place]  Face is symmetrical to light touch  Movements of facial expression move symmetrically  Hearing is normal to finger rub bilaterally  Soft palate lifts symmetrically  Shoulder shrug is symmetrical  Tongue is midline without atrophy  Motor: No drift is noted on arm extension  Strength is full in the upper and lower extremities with normal bulk and tone  Sensory:  Absent vibratory sensation with normal temperature sensation was noted in the distal lower extremities bilaterally  Cortical function is intact  Coordination: Finger to nose testing is performed accurately  Romberg is positive with eyes closed  Gait reveals a normal base with symmetrical arm swing  Tandem walk not be performed without stepping off  Reflexes:  Trace in the biceps and brachioradialis regions bilaterally 1 at the triceps bilaterally, 2 at the knees and trace at the ankles  Toes are downgoing            ROS:    Review of Systems   Constitutional: Negative  Negative for appetite change and fever  HENT: Negative  Negative for hearing loss, tinnitus, trouble swallowing and voice change  Eyes: Negative    Negative for photophobia, pain and visual disturbance  Respiratory: Negative  Negative for shortness of breath  Cardiovascular: Negative  Negative for palpitations  Gastrointestinal: Negative  Negative for nausea and vomiting  Endocrine: Negative  Negative for cold intolerance  Genitourinary: Negative  Negative for dysuria, frequency and urgency  Musculoskeletal: Positive for gait problem  Negative for myalgias and neck pain  Skin: Negative  Negative for rash  Allergic/Immunologic: Negative  Neurological: Positive for dizziness, light-headedness and numbness (both arms)  Negative for tremors, seizures, syncope, facial asymmetry, speech difficulty, weakness and headaches  Hematological: Negative  Does not bruise/bleed easily  Psychiatric/Behavioral: Positive for sleep disturbance  Negative for confusion and hallucinations

## 2022-10-19 NOTE — TELEPHONE ENCOUNTER
Pt assisted to bathroom to obtain a urine sample.      Corby Amaral RN  08/24/21 0525     Your patient Niki Swain will need blood work put in for her MRI scans  She will need a BUN and CREATININE  Also patient is claustrophobic and will need something prescribed for tests      Her first two MRI's will be on November 21st at 6:00 pm and her second MRI will be November 29th at 12:45pm     Thank you

## 2022-10-19 NOTE — LETTER
October 19, 2022     Christiano Thompson MD  [address] [place] [id number]    Patient: Harry Alonzo   YOB: 1960   Date of Visit: 10/19/2022       Dear Dr Hayde Moreno:    Thank you for referring Harry Alonzo to me for evaluation  Below are my notes for this consultation  If you have questions, please do not hesitate to call me  I look forward to following your patient along with you  Sincerely,        Tima Mccullough MD        CC: No Recipients  Tima Mccullough MD  10/19/2022 10:58 AM  Incomplete  Harry Alonzo is a 60 y o  female  presents today with history of MS    Assessment:  1  Multiple sclerosis ([facility] [address] )        Plan:  MRI brain, cervical and thoracic regions with without contrast   Continue Aubagio   Follow-up afterwards    Discussion:  Margarita Swenson was diagnosed with MS at 2016 after presenting with slurred speech blurred vision and left arm weakness  Imaging studies were consistent with MS with respect to distribution of T2 lesions, however no enhancement was noted  Lumbar puncture was not performed  She remains stable on Aubagio with the exception of some transient neurologic symptoms in August of 2021 which were attributed to possible MS attack, however only CT of the head was done which was normal, MRI was not repeated  She has no localizing findings on today's exam   I have encouraged her to stop smoking  Will have her continue with Aubagio for now  Will obtain MRI brain cervical and thoracic spines with without contrast   Consider at some point moving forward to check lumbar puncture  I will see her back in follow-up in a few months  Subjective:    HPI  Margarita Swenson is a right-handed woman who presents with the above complaints  She  Presented with symptom of blurred vision, speech difficulty, gait difficulty and left-sided weakness    She had a brain MRI done with without contrast that demonstrated T2 high signal regions in a distribution consistent with MS in the supratentorial and infratentorial regions without enhancement an MRI of the cervical spine demonstrating spondylitic changes with canal narrowing without lesions/enhancement  Blood work was unremarkable  Lumbar puncture was never obtained  She was placed on Aubagio  These studies were all performed at [facility]  Subsequent to that she continue to follow-up with [facility] approximately annually  Follow-up imaging studies were performed at [facility]  In 2018 a brain MRI at [facility] describes similar findings with the distribution of white matter changes consistent with MS in the supratentorial infant to her regions  Cervical MRI in 2018 describe some motion artifact but suggests possible scattered demyelinating lesions within the spinal cord without enhancement  Brain MRI performed in June of 2019 describes similar demyelinating type changes without change in size or number without enhancement  MRI of the cervical spine performed in October of 2020 was done without contrast and makes no mention of any spinal cord lesions at all  In May of 2021 she had an MRI of the brain done with without contrast that again demonstrated no significant change in lesions size or burden without enhancement  She presented the emergency room in August of 2021 with symptoms of facial droop, speech and left-sided weakness predominantly in the arm  Initial concern was possible CVA, however CT scan failed to demonstrate any significant abnormalities and it was felt this could possibly be related to an exacerbation of MS although brain MRI was never done  Since that time she has had no new neurologic symptoms  She does smoke a couple of cigarettes a day is trying to quit  She denies any history of similar neurologic symptoms prior to 2016  She reports that she is having left shoulder replacement surgery tomorrow    She also reports that she has a problem with her left knee was was exacerbated getting out of the car today      Past Medical History:   Diagnosis Date   • Chronic kidney disease    • Glaucoma    • Hypertension    • Migraine    • Multiple sclerosis ([facility] [address] )    • Night muscle spasms    • Stroke ([facility] [address] )     2 strokes in past, 2011 and 2013       Family History:  Family History   Problem Relation Age of Onset   • No Known Problems Mother    • No Known Problems Father        Past Surgical History:  Past Surgical History:   Procedure Laterality Date   • ESOPHAGOGASTRODUODENOSCOPY N/A 8/9/2016    Procedure: ESOPHAGOGASTRODUODENOSCOPY (EGD); Surgeon: Christian Kelly MD;  Location: [facility]; Service:    • HIP ARTHROPLASTY Bilateral        Social History:   reports that she has been smoking cigarettes  She has a 2 50 pack-year smoking history  She has never used smokeless tobacco  She reports current alcohol use  She reports that she does not use drugs      Allergies:  Acetaminophen, Aspirin, and Lisinopril      Current Outpatient Medications:   •  atorvastatin (LIPITOR) 40 mg tablet, , Disp: , Rfl:   •  Aubagio 14 MG TABS, in the morning, Disp: , Rfl:   •  Cholecalciferol 125 MCG (5000 UT) capsule, 5,000 Units, Disp: , Rfl:   •  cyclobenzaprine (FLEXERIL) 10 mg tablet, Take 1 tablet (10 mg total) by mouth 3 (three) times a day as needed for muscle spasms, Disp: 60 tablet, Rfl: 0  •  ferrous sulfate 325 (65 Fe) mg tablet, Take 65 mg by mouth, Disp: , Rfl:   •  folic acid (FOLVITE) 1 mg tablet, , Disp: , Rfl:   •  gabapentin (NEURONTIN) 600 MG tablet, 800 mg 4 (four) times a day, Disp: , Rfl:   •  hydrALAZINE (APRESOLINE) 50 mg tablet, Take 50 mg by mouth Three times a day, Disp: , Rfl:   •  latanoprost (XALATAN) 0 005 % ophthalmic solution, Administer 1 drop to both eyes daily, Disp: , Rfl:   •  losartan (COZAAR) 50 mg tablet, Take one tablet by mouth once a day, Disp: , Rfl:   •  NIFEdipine ER (ADALAT CC) 60 MG 24 hr tablet, TAKE TWO TABLETS BY MOUTH EVERY DAY, Disp: , Rfl:   •  nitroglycerin (NITROSTAT) 0 4 mg SL tablet, PRN, Disp: , Rfl:   •  omeprazole (PriLOSEC) 20 mg delayed release capsule, omeprazole 20 mg capsule,delayed release, Disp: , Rfl:   •  timolol (BETIMOL) 0 5 % ophthalmic solution, Administer 1 drop to both eyes 2 (two) times a day  , Disp: , Rfl:   •  amLODIPine (NORVASC) 10 mg tablet, TAKE ONE TABLET BY MOUTH EVERY MORNING (Patient not taking: No sig reported), Disp: , Rfl:     I have reviewed the past medical, social and family history, current medications, allergies, vitals, review of systems and updated this information as appropriate today     Objective:    Vitals:  Blood pressure 130/88, height 5' 11" (1 803 m), weight 92 1 kg (203 lb)  Physical Exam    Neurological Exam    GENERAL:  Cooperative in no acute distress  Well-developed and well-nourished    HEAD and NECK   Head is atraumatic normocephalic with no lesions or masses  Neck is supple with full range of motion    CARDIOVASCULAR  Carotid Arteries-no carotid bruits  NEUROLOGIC:  Mental Status-the patient is awake alert and oriented without aphasia or apraxia  Cranial Nerves: Visual fields are full to confrontation  Discs are difficult to visualize on the right and appear flat without significant pallor on the left  Extraocular movements reveal a right exotropia without nystagmus  Pupils are 2-1/2 mm and reactive with negative Natalie Bret  Face is symmetrical to light touch  Movements of facial expression move symmetrically  Hearing is normal to finger rub bilaterally  Soft palate lifts symmetrically  Shoulder shrug is symmetrical  Tongue is midline without atrophy  Motor: No drift is noted on arm extension  Strength is full in the upper and lower extremities with normal bulk and tone  Sensory:  Absent vibratory sensation with normal temperature sensation was noted in the distal lower extremities bilaterally  Cortical function is intact  Coordination: Finger to nose testing is performed accurately   Romberg is positive with eyes closed  Gait reveals a normal base with symmetrical arm swing  Tandem walk not be performed without stepping off  Reflexes:  Trace in the biceps and brachioradialis regions bilaterally 1 at the triceps bilaterally, 2 at the knees and trace at the ankles  Toes are downgoing            ROS:    Review of Systems   Constitutional: Negative  Negative for appetite change and fever  HENT: Negative  Negative for hearing loss, tinnitus, trouble swallowing and voice change  Eyes: Negative  Negative for photophobia, pain and visual disturbance  Respiratory: Negative  Negative for shortness of breath  Cardiovascular: Negative  Negative for palpitations  Gastrointestinal: Negative  Negative for nausea and vomiting  Endocrine: Negative  Negative for cold intolerance  Genitourinary: Negative  Negative for dysuria, frequency and urgency  Musculoskeletal: Positive for gait problem  Negative for myalgias and neck pain  Skin: Negative  Negative for rash  Allergic/Immunologic: Negative  Neurological: Positive for dizziness, light-headedness and numbness (both arms)  Negative for tremors, seizures, syncope, facial asymmetry, speech difficulty, weakness and headaches  Hematological: Negative  Does not bruise/bleed easily  Psychiatric/Behavioral: Positive for sleep disturbance  Negative for confusion and hallucinations

## 2022-11-18 ENCOUNTER — APPOINTMENT (OUTPATIENT)
Dept: LAB | Facility: CLINIC | Age: 62
End: 2022-11-18

## 2022-11-18 DIAGNOSIS — G35 MULTIPLE SCLEROSIS (HCC): ICD-10-CM

## 2022-11-18 LAB
BUN SERPL-MCNC: 15 MG/DL (ref 5–25)
CREAT SERPL-MCNC: 1.41 MG/DL (ref 0.6–1.3)
GFR SERPL CREATININE-BSD FRML MDRD: 39 ML/MIN/1.73SQ M

## 2022-11-21 ENCOUNTER — HOSPITAL ENCOUNTER (OUTPATIENT)
Dept: MRI IMAGING | Facility: HOSPITAL | Age: 62
Discharge: HOME | End: 2022-11-21
Attending: PSYCHIATRY & NEUROLOGY

## 2022-11-21 DIAGNOSIS — G35 MULTIPLE SCLEROSIS (HCC): ICD-10-CM

## 2022-11-21 RX ADMIN — GADOBUTROL 9 ML: 604.72 INJECTION INTRAVENOUS at 18:54

## 2022-11-29 ENCOUNTER — HOSPITAL ENCOUNTER (OUTPATIENT)
Dept: MRI IMAGING | Facility: HOSPITAL | Age: 62
Discharge: HOME | End: 2022-11-29
Attending: PSYCHIATRY & NEUROLOGY

## 2022-11-29 DIAGNOSIS — G35 MULTIPLE SCLEROSIS (HCC): ICD-10-CM

## 2022-11-29 RX ADMIN — GADOBUTROL 9 ML: 604.72 INJECTION INTRAVENOUS at 14:13

## 2023-01-10 LAB
CREAT ?TM UR-SCNC: 46.1 UMOL/L
EXT PROTEIN URINE: 8.6
PROT/CREAT UR: 0.19 MG/G{CREAT}

## 2023-02-14 ENCOUNTER — TELEPHONE (OUTPATIENT)
Dept: NEPHROLOGY | Facility: CLINIC | Age: 63
End: 2023-02-14

## 2023-02-14 NOTE — TELEPHONE ENCOUNTER
I called Grant Hair O @ [phone] @ spoke to [facility] () to check eligible for the patient and as of 2/14/2023 the patient has current active coverage as of 6/15/2022  The reference number for this call is: [id number]   Geri Casillas,

## 2023-02-27 ENCOUNTER — TELEPHONE (OUTPATIENT)
Dept: NEPHROLOGY | Facility: CLINIC | Age: 63
End: 2023-02-27

## 2023-02-28 ENCOUNTER — OFFICE VISIT (OUTPATIENT)
Dept: NEPHROLOGY | Facility: CLINIC | Age: 63
End: 2023-02-28

## 2023-02-28 VITALS
OXYGEN SATURATION: 98 % | HEART RATE: 85 BPM | BODY MASS INDEX: 28.31 KG/M2 | SYSTOLIC BLOOD PRESSURE: 130 MMHG | DIASTOLIC BLOOD PRESSURE: 70 MMHG | HEIGHT: 71 IN | RESPIRATION RATE: 16 BRPM | TEMPERATURE: 97.8 F

## 2023-02-28 DIAGNOSIS — E83.52 HYPERCALCEMIA: ICD-10-CM

## 2023-02-28 DIAGNOSIS — G35 MULTIPLE SCLEROSIS (HCC): ICD-10-CM

## 2023-02-28 DIAGNOSIS — N18.9 CHRONIC KIDNEY DISEASE-MINERAL AND BONE DISORDER: ICD-10-CM

## 2023-02-28 DIAGNOSIS — N18.30 STAGE 3 CHRONIC KIDNEY DISEASE, UNSPECIFIED WHETHER STAGE 3A OR 3B CKD (HCC): Primary | ICD-10-CM

## 2023-02-28 DIAGNOSIS — E83.9 CHRONIC KIDNEY DISEASE-MINERAL AND BONE DISORDER: ICD-10-CM

## 2023-02-28 DIAGNOSIS — M89.9 CHRONIC KIDNEY DISEASE-MINERAL AND BONE DISORDER: ICD-10-CM

## 2023-02-28 DIAGNOSIS — I10 ESSENTIAL HYPERTENSION: Chronic | ICD-10-CM

## 2023-02-28 RX ORDER — CLOPIDOGREL BISULFATE 75 MG/1
75 TABLET ORAL DAILY
COMMUNITY

## 2023-02-28 RX ORDER — LANOLIN ALCOHOL/MO/W.PET/CERES
3 CREAM (GRAM) TOPICAL
COMMUNITY
Start: 2023-01-24 | End: 2024-01-24

## 2023-02-28 NOTE — PROGRESS NOTES
NEPHROLOGY OFFICE FOLLOW UP  Sharmila Banks 60 y o  female MRN: [id number]    Encounter: [id number] 2/28/2023    REASON FOR VISIT: Sharmila Banks is a 60 y o  female who is here on 2/28/2023 for Chronic Kidney Disease and Follow-up    HPI:    Isatu Rivera came in today for follow-up of CKD  21-year-old woman with history of multiple sclerosis back pain who also has CKD    Overall she is feeling well denies any acute complaint    No chest pain no palpitation or shortness of breath    Denies any urinary complaint      REVIEW OF SYSTEMS:    Review of Systems   Constitutional: Negative for activity change and fatigue  HENT: Negative for congestion and ear discharge  Eyes: Negative for photophobia and pain  Respiratory: Negative for apnea and choking  Cardiovascular: Negative for chest pain and palpitations  Gastrointestinal: Negative for abdominal distention and blood in stool  Endocrine: Negative for heat intolerance and polyphagia  Genitourinary: Negative for flank pain and urgency  Musculoskeletal: Positive for back pain  Negative for neck pain and neck stiffness  Skin: Negative for color change and wound  Allergic/Immunologic: Negative for food allergies and immunocompromised state  Neurological: Negative for seizures and facial asymmetry  Hematological: Negative for adenopathy  Does not bruise/bleed easily  Psychiatric/Behavioral: Negative for self-injury and suicidal ideas  PAST MEDICAL HISTORY:  Past Medical History:   Diagnosis Date   • Chronic kidney disease    • Glaucoma    • Hypertension    • Knee arthropathy 2023    Left Knee   • Migraine    • Multiple sclerosis ([facility] [address] )    • Night muscle spasms    • Stroke ([facility] [address] )     2 strokes in past, 2011 and 2013       PAST SURGICAL HISTORY:  Past Surgical History:   Procedure Laterality Date   • ESOPHAGOGASTRODUODENOSCOPY N/A 8/9/2016    Procedure: ESOPHAGOGASTRODUODENOSCOPY (EGD); Surgeon: Bessy Reardon MD;  Location: [facility];   Service:    • HIP ARTHROPLASTY Bilateral        SOCIAL HISTORY:  Social History     Substance and Sexual Activity   Alcohol Use Yes     Social History     Substance and Sexual Activity   Drug Use No     Social History     Tobacco Use   Smoking Status Every Day   • Packs/day: 0 25   • Years: 10 00   • Pack years: 2 50   • Types: Cigarettes   • Last attempt to quit: 2021   • Years since quittin 1   Smokeless Tobacco Never   Tobacco Comments    1 or 2 a week       FAMILY HISTORY:  Family History   Problem Relation Age of Onset   • No Known Problems Mother    • No Known Problems Father        MEDICATIONS:    Current Outpatient Medications:   •  atorvastatin (LIPITOR) 40 mg tablet, , Disp: , Rfl:   •  Aubagio 14 MG TABS, in the morning, Disp: , Rfl:   •  Cholecalciferol 125 MCG (5000 UT) capsule, 5,000 Units, Disp: , Rfl:   •  clopidogrel (PLAVIX) 75 mg tablet, Take 75 mg by mouth daily, Disp: , Rfl:   •  cyclobenzaprine (FLEXERIL) 10 mg tablet, Take 1 tablet (10 mg total) by mouth 3 (three) times a day as needed for muscle spasms, Disp: 60 tablet, Rfl: 0  •  ferrous sulfate 325 (65 Fe) mg tablet, Take 65 mg by mouth, Disp: , Rfl:   •  folic acid (FOLVITE) 1 mg tablet, , Disp: , Rfl:   •  gabapentin (NEURONTIN) 600 MG tablet, 800 mg 4 (four) times a day, Disp: , Rfl:   •  hydrALAZINE (APRESOLINE) 50 mg tablet, Take 50 mg by mouth Three times a day, Disp: , Rfl:   •  losartan (COZAAR) 50 mg tablet, Take one tablet by mouth once a day, Disp: , Rfl:   •  melatonin 3 mg, Take 3 mg by mouth, Disp: , Rfl:   •  NIFEdipine ER (ADALAT CC) 60 MG 24 hr tablet, TAKE TWO TABLETS BY MOUTH EVERY DAY, Disp: , Rfl:   •  nitroglycerin (NITROSTAT) 0 4 mg SL tablet, PRN, Disp: , Rfl:   •  omeprazole (PriLOSEC) 20 mg delayed release capsule, omeprazole 20 mg capsule,delayed release, Disp: , Rfl:   •  LORazepam (ATIVAN) 1 mg tablet, Two p o  1 hour before MRI, may repeat 1 after 1 hour p r n  (Patient not taking: Reported on 2023), Disp: 3 tablet, Rfl: 1    PHYSICAL EXAM:  Vitals:    02/28/23 0955   BP: 130/70   BP Location: Right arm   Patient Position: Sitting   Pulse: 85   Resp: 16   Temp: 97 8 °F (36 6 °C)   TempSrc: Temporal   SpO2: 98%   Height: 5' 11" (1 803 m)     Body mass index is 28 31 kg/m²  Physical Exam  Constitutional:       General: She is not in acute distress  Appearance: She is well-developed  HENT:      Head: Normocephalic  Mouth/Throat:      Mouth: Mucous membranes are moist    Eyes:      General: No scleral icterus  Conjunctiva/sclera: Conjunctivae normal    Neck:      Vascular: No JVD  Cardiovascular:      Rate and Rhythm: Normal rate  Heart sounds: Normal heart sounds  Pulmonary:      Effort: Pulmonary effort is normal       Breath sounds: No wheezing  Abdominal:      General: Bowel sounds are normal       Palpations: Abdomen is soft  Tenderness: There is no abdominal tenderness  Musculoskeletal:         General: No swelling  Normal range of motion  Cervical back: Neck supple  Skin:     General: Skin is warm  Findings: No rash  Neurological:      General: No focal deficit present  Mental Status: She is alert and oriented to person, place, and time  Psychiatric:         Behavior: Behavior normal          LAB RESULTS:  Results for orders placed or performed in visit on 01/10/23   Protein / creatinine ratio, urine   Result Value Ref Range    PROTEIN UA 8 6     EXT Creatinine Urine 46 1     EXTERNAL Ur Prot/Creat Ratio 0 19        ASSESSMENT and PLAN:      Stage 3 chronic kidney disease ([facility] [address] )  Lab Results   Component Value Date    EGFR 39 11/18/2022    EGFR 53 0 08/10/2016    EGFR 53 5 08/10/2016    CREATININE 1 41 (H) 11/18/2022    CREATININE 1 26 08/10/2016    CREATININE 1 25 08/10/2016   Renal function is stable overall with some fluctuation    Advise hydration and avoiding nephrotoxic medication    Chronic kidney disease-mineral and bone disorder  Lab Results   Component Value Date    EGFR 39 11/18/2022    EGFR 53 0 08/10/2016    EGFR 53 5 08/10/2016    CREATININE 1 41 (H) 11/18/2022    CREATININE 1 26 08/10/2016    CREATININE 1 25 08/10/2016   Calcium and PTH are on higher side suggesting possible primary hyperparathyroidism  Patient is also on vitamin D which I advised her to stop  We will continue to monitor closely    Essential hypertension  Quite well controlled with present medication which we will continue    Multiple sclerosis ([address] )  Asymptomatic on medication and being monitored by neurologist    Hypercalcemia  Patient calcium is on higher side with high PTH level  As I mentioned before may be related to possible primary hyperparathyroidism  At this point we will stop vitamin D  We will recheck blood work in 3 months as part of the monitoring  Advise hydration at this point        Everything discussed at length with the patient  I will see her back in 6 months but she will get blood test in 3 months and in 6 months      Portions of the record may have been created with voice recognition software  Occasional wrong word or "sound a like" substitutions may have occurred due to the inherent limitations of voice recognition software  Read the chart carefully and recognize, using context, where substitutions have occurred  If you have any questions, please contact the dictating provider

## 2023-02-28 NOTE — ASSESSMENT & PLAN NOTE
Lab Results   Component Value Date    EGFR 39 11/18/2022    EGFR 53 0 08/10/2016    EGFR 53 5 08/10/2016    CREATININE 1 41 (H) 11/18/2022    CREATININE 1 26 08/10/2016    CREATININE 1 25 08/10/2016   Renal function is stable overall with some fluctuation    Advise hydration and avoiding nephrotoxic medication

## 2023-02-28 NOTE — ASSESSMENT & PLAN NOTE
Lab Results   Component Value Date    EGFR 39 11/18/2022    EGFR 53 0 08/10/2016    EGFR 53 5 08/10/2016    CREATININE 1 41 (H) 11/18/2022    CREATININE 1 26 08/10/2016    CREATININE 1 25 08/10/2016   Calcium and PTH are on higher side suggesting possible primary hyperparathyroidism  Patient is also on vitamin D which I advised her to stop    We will continue to monitor closely

## 2023-02-28 NOTE — ASSESSMENT & PLAN NOTE
Patient calcium is on higher side with high PTH level  As I mentioned before may be related to possible primary hyperparathyroidism  At this point we will stop vitamin D  We will recheck blood work in 3 months as part of the monitoring    Advise hydration at this point

## 2023-03-13 ENCOUNTER — TELEPHONE (OUTPATIENT)
Dept: PAIN MEDICINE | Facility: CLINIC | Age: 63
End: 2023-03-13

## 2023-03-13 DIAGNOSIS — G35 MULTIPLE SCLEROSIS (HCC): Primary | ICD-10-CM

## 2023-03-13 RX ORDER — RENAGEL 800 MG/1
14 TABLET ORAL DAILY
Qty: 30 TABLET | Refills: 6 | Status: SHIPPED | OUTPATIENT
Start: 2023-03-13

## 2023-03-13 NOTE — TELEPHONE ENCOUNTER
Pt left message   I have appt with dr Marilu Jhaveri ,is it 21 or 25, please call me back and let me know    CB# [phone]    Called pt back and made aware that her next scheduled appt is for 9/6/23  As per chart review apperas her appt with dr Marilu Jhaveri on 3/21/23 was cancelled due to provider  No sooner appt available-pt is already on waiting list  Pt stated that she needs refill of her Aubagio to be send to [address] [address]     Please sign pended script if agreeable

## 2023-03-13 NOTE — TELEPHONE ENCOUNTER
Attempted to reach pt  Voicemail not set up  Pt not seen since 10/2022   Please schedule ov when pt calls back to decide which injection would be most beneficial

## 2023-03-13 NOTE — TELEPHONE ENCOUNTER
Caller: Marcela Freeman    Doctor: trace     Reason for call: patient is ready to schedule next procedure     Call back#: [phone]

## 2023-04-13 NOTE — H&P (VIEW-ONLY)
Pain Medicine Follow-Up Note    Assessment:  1  Cervical spondylosis    2  Cervical radiculopathy    3  Myofascial pain syndrome        Plan:  Orders Placed This Encounter   Procedures   • Durable Medical Equipment     1 PNEUMATIC CERVICAL TRACTION COLLAR   • FL spine and pain procedure     Standing Status:   Future     Standing Expiration Date:   4/14/2027     Order Specific Question:   Reason for Exam:     Answer:   C7-T1 GEOVANNA- OK TO DB     Order Specific Question:   Anticoagulant hold needed? Answer:   PLAVIX       No orders of the defined types were placed in this encounter  My impressions and treatment recommendations were discussed in detail with the patient who verbalized understanding and had no further questions  28-year-old female who returns her office with severe bilateral neck pain secondary to cervical spondylosis as well as left upper extremity radicular symptoms  She is suffering today with significant neck pain and is frustrated at lack of improvement  Given that there is an acuity to her symptoms, I will order C7 and T1 cervical epidurals or injection to help quickly diminish the inflammatory component of her symptoms  This has historically been helpful for several months for her  However, I discussed that for more long-term relief for axial neck pain, she would be a good candidate for C3-5 medial branch block with possible radiofrequency ablation of greater than 80% relief  We will schedule this after GEOVANNA  Also ordered for her was a cervical traction collar  [place] Prescription Drug Monitoring Program report was reviewed and was appropriate     Complete risks and benefits including bleeding, infection, tissue reaction, nerve injury and allergic reaction were discussed  The approach was demonstrated using models and literature was provided  Verbal and written consent was obtained  Discharge instructions were provided   I personally saw and examined the patient and I agree with the above discussed plan of care  History of Present Illness:    Angle Galicia is a 60 y o  female who presents to [facility] for interval re-evaluation of the above stated pain complaints  The patient has a past medical and chronic pain history as outlined in the assessment section  She was last seen on 10/13/2022 for left neck and left trap pain  Received trigger point injection at the time with no significant meaningful relief  Pain score 10 out of 10  Worse in the morning nighttime  Pain is constant, sharp, numbness, pins-and-needles  Other than as stated above, the patient denies any interval changes in medications, medical condition, mental condition, symptoms, or allergies since the last office visit  Review of Systems:    Review of Systems   Respiratory: Positive for shortness of breath  Cardiovascular: Negative for chest pain  Gastrointestinal: Negative for constipation, diarrhea, nausea and vomiting  Musculoskeletal: Positive for arthralgias and gait problem  Negative for joint swelling and myalgias  Skin: Negative for rash  Neurological: Positive for dizziness  Negative for seizures and weakness  Psychiatric/Behavioral:        Memory loss   All other systems reviewed and are negative  Past Medical History:   Diagnosis Date   • Chronic kidney disease    • Glaucoma    • Hypertension    • Knee arthropathy 2023    Left Knee   • Migraine    • Multiple sclerosis ([facility] [address] )    • Night muscle spasms    • Stroke ([facility] [address] )     2 strokes in past, 2011 and 2013       Past Surgical History:   Procedure Laterality Date   • ESOPHAGOGASTRODUODENOSCOPY N/A 8/9/2016    Procedure: ESOPHAGOGASTRODUODENOSCOPY (EGD); Surgeon: Stan Mott MD;  Location: [facility];   Service:    • HIP ARTHROPLASTY Bilateral        Family History   Problem Relation Age of Onset   • No Known Problems Mother    • No Known Problems Father        Social History     Occupational History   • Not on file   Tobacco Use   • Smoking status: Every Day     Packs/day: 0 25     Years: 10 00     Pack years: 2 50     Types: Cigarettes     Last attempt to quit: 2021     Years since quittin 2   • Smokeless tobacco: Never   • Tobacco comments:     1 or 2 a week   Vaping Use   • Vaping Use: Never used   Substance and Sexual Activity   • Alcohol use:  Yes   • Drug use: No   • Sexual activity: Not Currently     Partners: Male         Current Outpatient Medications:   •  atorvastatin (LIPITOR) 40 mg tablet, , Disp: , Rfl:   •  Aubagio 14 MG TABS, Take 1 tablet (14 mg total) by mouth in the morning, Disp: 30 tablet, Rfl: 6  •  Cholecalciferol 125 MCG (5000 UT) capsule, 5,000 Units, Disp: , Rfl:   •  clopidogrel (PLAVIX) 75 mg tablet, Take 75 mg by mouth daily, Disp: , Rfl:   •  cyclobenzaprine (FLEXERIL) 10 mg tablet, Take 1 tablet (10 mg total) by mouth 3 (three) times a day as needed for muscle spasms, Disp: 60 tablet, Rfl: 0  •  ferrous sulfate 325 (65 Fe) mg tablet, Take 65 mg by mouth, Disp: , Rfl:   •  folic acid (FOLVITE) 1 mg tablet, , Disp: , Rfl:   •  gabapentin (NEURONTIN) 600 MG tablet, 800 mg 4 (four) times a day, Disp: , Rfl:   •  hydrALAZINE (APRESOLINE) 50 mg tablet, Take 50 mg by mouth Three times a day, Disp: , Rfl:   •  losartan (COZAAR) 50 mg tablet, Take one tablet by mouth once a day, Disp: , Rfl:   •  melatonin 3 mg, Take 3 mg by mouth, Disp: , Rfl:   •  NIFEdipine ER (ADALAT CC) 60 MG 24 hr tablet, TAKE TWO TABLETS BY MOUTH EVERY DAY, Disp: , Rfl:   •  nitroglycerin (NITROSTAT) 0 4 mg SL tablet, PRN, Disp: , Rfl:   •  omeprazole (PriLOSEC) 20 mg delayed release capsule, omeprazole 20 mg capsule,delayed release, Disp: , Rfl:   •  LORazepam (ATIVAN) 1 mg tablet, Two p o  1 hour before MRI, may repeat 1 after 1 hour p r n  (Patient not taking: Reported on 2023), Disp: 3 tablet, Rfl: 1    Allergies   Allergen Reactions   • Acetaminophen Swelling   • Aspirin Swelling   • "Lisinopril Swelling       Physical Exam:    /75 (BP Location: Right arm, Patient Position: Sitting, Cuff Size: Standard)   Pulse 91   Ht 5' 11\" (1 803 m)   Wt 95 1 kg (209 lb 9 6 oz)   BMI 29 23 kg/m²     Constitutional:normal, well developed, well nourished, alert, in no distress and non-toxic and no overt pain behavior    Eyes:anicteric  HEENT:grossly intact  Neck:supple, symmetric, trachea midline and no masses   Pulmonary:even and unlabored  Cardiovascular:No edema or pitting edema present  Skin:Normal without rashes or lesions and well hydrated  Psychiatric:Mood and affect appropriate  Neurologic:Cranial Nerves II-XII grossly intact  Musculoskeletal:normal      Imaging  FL spine and pain procedure    (Results Pending)         Orders Placed This Encounter   Procedures   • Durable Medical Equipment   • FL spine and pain procedure     "

## 2023-04-14 PROBLEM — M54.12 CERVICAL RADICULOPATHY: Status: ACTIVE | Noted: 2023-04-14

## 2023-04-14 PROBLEM — M47.812 CERVICAL SPONDYLOSIS: Status: ACTIVE | Noted: 2023-04-14

## 2023-04-18 ENCOUNTER — TELEPHONE (OUTPATIENT)
Dept: PAIN MEDICINE | Facility: CLINIC | Age: 63
End: 2023-04-18

## 2023-04-18 NOTE — TELEPHONE ENCOUNTER
[facility]    Doctor: trace    Reason for call: Niharika Musselshell medical needs PT notes, chart notes, for approval of cervical traction unit. The order as of right now is cancelled because this information is missing per the supplier.      Call back#: [phone]    Fax# [phone]

## 2023-04-24 NOTE — TELEPHONE ENCOUNTER
[address] [facility] will be reaching out to pt.  Requested notes were sent 5 days ago, per task below  Will call Claudia Hernandez to confirm nothing else is needed

## 2023-04-24 NOTE — TELEPHONE ENCOUNTER
Received call from Alessandro Wolf at [facility] where pt goes for PT. Patient mentioned that she is still waiting for her cervical traction collar, has not spoken to anyone from [facility] and is not sure what to do at this point. Alessandro Wolf is asking if anything new needs to be implemented in PT with this device, like traction? Can someone please call pt regarding the collar and advise her when/what to expect when it comes to receiving this? Also, please call Alessandro Wolf at [phone], option #2 to clarify if anything new needs to be done by PT with this device.

## 2023-04-26 ENCOUNTER — TELEPHONE (OUTPATIENT)
Dept: PAIN MEDICINE | Facility: MEDICAL CENTER | Age: 63
End: 2023-04-26

## 2023-04-26 NOTE — TELEPHONE ENCOUNTER
S/W pt and advised hold permission would be needed for Plavix from Cardio  Will call pt to schedule once received

## 2023-04-26 NOTE — TELEPHONE ENCOUNTER
Hold request faxed to Dr Edita Jhaveri [facility], [address] [place] [address]    Phone: [phone]    Fax: [phone]      To be scheduled with SP: C7-T1 GEOVANNA- OK TO DB

## 2023-04-27 NOTE — TELEPHONE ENCOUNTER
S/W pt and scheduled for GEOVANNA on 5/9 at 0900 DB  LD Plavix on 5/1  Advised  needed  Nothing to eat or drink one hour prior  Something easy to change out of to put on gown  Denies DM  Denies any vaccinations and reminded not to get any 2 weeks prior or 2 weeks after    Advised to call and cancel if sick or put on ABX for any infection  CB from now until procedure with any questions

## 2023-04-28 NOTE — TELEPHONE ENCOUNTER
Pt will be in 5/9 for procedure. Will call Christopher Ward again if pt has not received traction collar by then.

## 2023-05-09 ENCOUNTER — HOSPITAL ENCOUNTER (OUTPATIENT)
Dept: RADIOLOGY | Facility: CLINIC | Age: 63
Discharge: HOME | End: 2023-05-09
Admitting: STUDENT IN AN ORGANIZED HEALTH CARE EDUCATION/TRAINING PROGRAM

## 2023-05-09 VITALS
OXYGEN SATURATION: 95 % | RESPIRATION RATE: 20 BRPM | HEART RATE: 83 BPM | TEMPERATURE: 97.6 F | DIASTOLIC BLOOD PRESSURE: 74 MMHG | SYSTOLIC BLOOD PRESSURE: 115 MMHG

## 2023-05-09 DIAGNOSIS — M47.812 CERVICAL SPONDYLOSIS: ICD-10-CM

## 2023-05-09 DIAGNOSIS — M54.12 CERVICAL RADICULOPATHY: ICD-10-CM

## 2023-05-09 DIAGNOSIS — M47.812 CERVICAL SPONDYLOSIS: Primary | ICD-10-CM

## 2023-05-09 RX ORDER — METHYLPREDNISOLONE ACETATE 80 MG/ML
80 INJECTION, SUSPENSION INTRA-ARTICULAR; INTRALESIONAL; INTRAMUSCULAR; PARENTERAL; SOFT TISSUE ONCE
Status: COMPLETED | OUTPATIENT
Start: 2023-05-09 | End: 2023-05-09

## 2023-05-09 RX ADMIN — IOHEXOL 1 ML: 300 INJECTION, SOLUTION INTRAVENOUS at 09:16

## 2023-05-09 RX ADMIN — METHYLPREDNISOLONE ACETATE 80 MG: 80 INJECTION, SUSPENSION INTRA-ARTICULAR; INTRALESIONAL; INTRAMUSCULAR; PARENTERAL; SOFT TISSUE at 09:17

## 2023-05-09 NOTE — DISCHARGE INSTR - LAB
Epidural Steroid Injection   WHAT YOU NEED TO KNOW:   An epidural steroid injection (JADA) is a procedure to inject steroid medicine into the epidural space  The epidural space is between your spinal cord and vertebrae  Steroids reduce inflammation and fluid buildup in your spine that may be causing pain  You may be given pain medicine along with the steroids  ACTIVITY  Do not drive or operate machinery today  No strenuous activity today - bending, lifting, etc   You may resume normal activites starting tomorrow - start slowly and as tolerated  You may shower today, but no tub baths or hot tubs  You may have numbness for several hours from the local anesthetic  Please use caution and common sense, especially with weight-bearing activities  CARE OF THE INJECTION SITE  If you have soreness or pain, apply ice to the area today (20 minutes on/20 minutes off)  Starting tomorrow, you may use warm, moist heat or ice if needed  You may have an increase or change in your discomfort for 36-48 hours after your treatment  Apply ice and continue with any pain medication you have been prescribed  Notify the [facility] if you have any of the following: redness, drainage, swelling, headache, stiff neck or fever above 100°F     SPECIAL INSTRUCTIONS  Our office will contact you in approximately 7 days for a progress report  MEDICATIONS  Continue to take all routine medications  Our office may have instructed you to hold some medications  As no general anesthesia was used in today's procedure, you should not experience any side effects related to anesthesia  If you are diabetic, the steroids used in today's injection may temporarily increase your blood sugar levels after the first few days after your injection  Please keep a close eye on your sugars and alert the doctor who manages your diabetes if your sugars are significantly high from your baseline or you are symptomatic       If you have a problem specifically related to your procedure, please call our office at [phone]  Problems not related to your procedure should be directed to your primary care physician

## 2023-05-09 NOTE — INTERVAL H&P NOTE
Update: (This section must be completed if the H&P was completed greater than 24 hrs to procedure or admission)    H&P reviewed  After examining the patient, I find no changed to the H&P since it had been written  Patient re-evaluated   Accept as history and physical     Claudeen Gather, MD/May 9, 2023/8:56 AM

## 2023-05-16 ENCOUNTER — TELEPHONE (OUTPATIENT)
Dept: PAIN MEDICINE | Facility: CLINIC | Age: 63
End: 2023-05-16

## 2023-05-16 DIAGNOSIS — M47.812 CERVICAL SPONDYLOSIS: Primary | ICD-10-CM

## 2023-06-05 NOTE — TELEPHONE ENCOUNTER
Caller: Ghazala Coronado    Doctor/office: Norah Montelongo  #: [phone]  % of improvement: 45%  Pain Scale (1-10): 8/10

## 2023-06-16 NOTE — TELEPHONE ENCOUNTER
Called patient to [facility] MBB #1 patient states she does not want to josephine at this time and will have her son call back when she is ready to josephine

## 2023-08-08 ENCOUNTER — TELEPHONE (OUTPATIENT)
Dept: NEPHROLOGY | Facility: CLINIC | Age: 63
End: 2023-08-08

## 2023-08-08 NOTE — TELEPHONE ENCOUNTER
I called [address] [place] [address] @ [phone] (Automated System) to check eligible for the patient and as of 8/8/2023 the patient has current active coverage as of 6/15/2022.  Viky Villafuerte, .

## 2023-08-15 ENCOUNTER — TELEPHONE (OUTPATIENT)
Dept: NEPHROLOGY | Facility: CLINIC | Age: 63
End: 2023-08-15

## 2023-08-18 ENCOUNTER — TELEPHONE (OUTPATIENT)
Dept: NEPHROLOGY | Facility: CLINIC | Age: 63
End: 2023-08-18

## 2023-08-18 DIAGNOSIS — N18.30 STAGE 3 CHRONIC KIDNEY DISEASE, UNSPECIFIED WHETHER STAGE 3A OR 3B CKD (HCC): Primary | ICD-10-CM

## 2023-08-18 NOTE — TELEPHONE ENCOUNTER
I called and left message on patients answering machine confirming appt for Monday 08/21/2023 [facility] Dr. Liana García

## 2023-08-21 ENCOUNTER — APPOINTMENT (EMERGENCY)
Dept: CT IMAGING | Facility: HOSPITAL | Age: 63
DRG: 054 | End: 2023-08-21
Payer: COMMERCIAL

## 2023-08-21 ENCOUNTER — OFFICE VISIT (OUTPATIENT)
Dept: NEPHROLOGY | Facility: CLINIC | Age: 63
End: 2023-08-21
Payer: COMMERCIAL

## 2023-08-21 ENCOUNTER — HOSPITAL ENCOUNTER (INPATIENT)
Facility: HOSPITAL | Age: 63
LOS: 1 days | Discharge: HOME | DRG: 054 | End: 2023-08-24
Attending: EMERGENCY MEDICINE | Admitting: FAMILY MEDICINE
Payer: COMMERCIAL

## 2023-08-21 ENCOUNTER — APPOINTMENT (OUTPATIENT)
Dept: RADIOLOGY | Facility: HOSPITAL | Age: 63
DRG: 054 | End: 2023-08-21
Payer: COMMERCIAL

## 2023-08-21 VITALS
RESPIRATION RATE: 16 BRPM | SYSTOLIC BLOOD PRESSURE: 80 MMHG | OXYGEN SATURATION: 98 % | BODY MASS INDEX: 28 KG/M2 | DIASTOLIC BLOOD PRESSURE: 60 MMHG | TEMPERATURE: 96.9 F | HEIGHT: 71 IN | WEIGHT: 200 LBS | HEART RATE: 92 BPM

## 2023-08-21 DIAGNOSIS — M89.9 CHRONIC KIDNEY DISEASE-MINERAL AND BONE DISORDER: ICD-10-CM

## 2023-08-21 DIAGNOSIS — R42 DIZZINESS: ICD-10-CM

## 2023-08-21 DIAGNOSIS — I10 ESSENTIAL HYPERTENSION: Chronic | ICD-10-CM

## 2023-08-21 DIAGNOSIS — I95.9 HYPOTENSIVE EPISODE: Primary | ICD-10-CM
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
## 2023-08-21 DIAGNOSIS — M54.16 LUMBAR RADICULOPATHY: ICD-10-CM

## 2023-08-21 DIAGNOSIS — N39.0 URINARY TRACT INFECTION WITHOUT HEMATURIA, SITE UNSPECIFIED: ICD-10-CM

## 2023-08-21 DIAGNOSIS — E83.9 CHRONIC KIDNEY DISEASE-MINERAL AND BONE DISORDER: ICD-10-CM

## 2023-08-21 DIAGNOSIS — N18.30 STAGE 3 CHRONIC KIDNEY DISEASE, UNSPECIFIED WHETHER STAGE 3A OR 3B CKD (HCC): Primary | ICD-10-CM

## 2023-08-21 DIAGNOSIS — N18.9 ACUTE ON CHRONIC RENAL INSUFFICIENCY: ICD-10-CM

## 2023-08-21 DIAGNOSIS — N18.9 CHRONIC KIDNEY DISEASE-MINERAL AND BONE DISORDER: ICD-10-CM

## 2023-08-21 DIAGNOSIS — R29.90 STROKE-LIKE SYMPTOMS: ICD-10-CM

## 2023-08-21 DIAGNOSIS — H53.2 DOUBLE VISION WITH BOTH EYES OPEN: ICD-10-CM

## 2023-08-21 DIAGNOSIS — E83.52 HYPERCALCEMIA: ICD-10-CM

## 2023-08-21 DIAGNOSIS — R51.9 HEADACHE: ICD-10-CM

## 2023-08-21 DIAGNOSIS — R51.9 INTRACTABLE HEADACHE: ICD-10-CM

## 2023-08-21 DIAGNOSIS — N28.9 ACUTE ON CHRONIC RENAL INSUFFICIENCY: ICD-10-CM

## 2023-08-21 DIAGNOSIS — N30.80 EMPHYSEMATOUS CYSTITIS: ICD-10-CM

## 2023-08-21 PROBLEM — N30.90 CYSTITIS: Status: ACTIVE | Noted: 2023-08-21

## 2023-08-21 LAB
2HR DELTA HS TROPONIN: 2 NG/L
4HR DELTA HS TROPONIN: 0 NG/L
ALBUMIN SERPL BCP-MCNC: 4.9 G/DL (ref 3.5–5)
ALP SERPL-CCNC: 136 U/L (ref 34–104)
ALT SERPL W P-5'-P-CCNC: 17 U/L (ref 7–52)
ANION GAP SERPL CALCULATED.3IONS-SCNC: 9 MMOL/L
AST SERPL W P-5'-P-CCNC: 21 U/L (ref 13–39)
ATRIAL RATE: 74 BPM
ATRIAL RATE: 79 BPM
BACTERIA UR QL AUTO: ABNORMAL /HPF
BASOPHILS # BLD MANUAL: 0 THOUSAND/UL (ref 0–0.1)
BASOPHILS NFR MAR MANUAL: 0 % (ref 0–1)
BILIRUB DIRECT SERPL-MCNC: 0.06 MG/DL (ref 0–0.2)
BILIRUB SERPL-MCNC: 0.35 MG/DL (ref 0.2–1)
BILIRUB UR QL STRIP: NEGATIVE
BNP SERPL-MCNC: 13 PG/ML (ref 0–100)
BUN SERPL-MCNC: 14 MG/DL (ref 5–25)
CALCIUM SERPL-MCNC: 10.7 MG/DL (ref 8.4–10.2)
CARDIAC TROPONIN I PNL SERPL HS: 12 NG/L
CARDIAC TROPONIN I PNL SERPL HS: 12 NG/L
CARDIAC TROPONIN I PNL SERPL HS: 14 NG/L
CHLORIDE SERPL-SCNC: 104 MMOL/L (ref 96–108)
CLARITY UR: ABNORMAL
CO2 SERPL-SCNC: 27 MMOL/L (ref 21–32)
COLOR UR: ABNORMAL
CREAT SERPL-MCNC: 1.84 MG/DL (ref 0.6–1.3)
EOSINOPHIL # BLD MANUAL: 0.57 THOUSAND/UL (ref 0–0.4)
EOSINOPHIL NFR BLD MANUAL: 5 % (ref 0–6)
ERYTHROCYTE [DISTWIDTH] IN BLOOD BY AUTOMATED COUNT: 14.7 % (ref 11.6–15.1)
GFR SERPL CREATININE-BSD FRML MDRD: 28 ML/MIN/1.73SQ M
GLUCOSE SERPL-MCNC: 103 MG/DL (ref 65–140)
GLUCOSE UR STRIP-MCNC: NEGATIVE MG/DL
HCT VFR BLD AUTO: 42.1 % (ref 34.8–46.1)
HGB BLD-MCNC: 13.4 G/DL (ref 11.5–15.4)
HGB UR QL STRIP.AUTO: NEGATIVE
HYALINE CASTS #/AREA URNS LPF: ABNORMAL /LPF
KETONES UR STRIP-MCNC: NEGATIVE MG/DL
LACTATE SERPL-SCNC: 1.4 MMOL/L (ref 0.5–2)
LEUKOCYTE ESTERASE UR QL STRIP: ABNORMAL
LYMPHOCYTES # BLD AUTO: 2.95 THOUSAND/UL (ref 0.6–4.47)
LYMPHOCYTES # BLD AUTO: 26 % (ref 14–44)
MAGNESIUM SERPL-MCNC: 1.9 MG/DL (ref 1.9–2.7)
MCH RBC QN AUTO: 29.5 PG (ref 26.8–34.3)
MCHC RBC AUTO-ENTMCNC: 31.8 G/DL (ref 31.4–37.4)
MCV RBC AUTO: 93 FL (ref 82–98)
MONOCYTES # BLD AUTO: 0.45 THOUSAND/UL (ref 0–1.22)
MONOCYTES NFR BLD: 4 % (ref 4–12)
NEUTROPHILS # BLD MANUAL: 7.37 THOUSAND/UL (ref 1.85–7.62)
NEUTS SEG NFR BLD AUTO: 65 % (ref 43–75)
NITRITE UR QL STRIP: NEGATIVE
NON-SQ EPI CELLS URNS QL MICRO: ABNORMAL /HPF
OVALOCYTES BLD QL SMEAR: PRESENT
P AXIS: 19 DEGREES
P AXIS: 69 DEGREES
PH UR STRIP.AUTO: 6.5 [PH]
PLATELET # BLD AUTO: 306 THOUSANDS/UL (ref 149–390)
PLATELET BLD QL SMEAR: ADEQUATE
PMV BLD AUTO: 11.4 FL (ref 8.9–12.7)
POLYCHROMASIA BLD QL SMEAR: PRESENT
POTASSIUM SERPL-SCNC: 3.9 MMOL/L (ref 3.5–5.3)
PR INTERVAL: 136 MS
PR INTERVAL: 162 MS
PROCALCITONIN SERPL-MCNC: 0.08 NG/ML
PROT SERPL-MCNC: 8.6 G/DL (ref 6.4–8.4)
PROT UR STRIP-MCNC: NEGATIVE MG/DL
QRS AXIS: 70 DEGREES
QRS AXIS: 78 DEGREES
QRSD INTERVAL: 78 MS
QRSD INTERVAL: 90 MS
QT INTERVAL: 366 MS
QT INTERVAL: 394 MS
QTC INTERVAL: 419 MS
QTC INTERVAL: 437 MS
RBC # BLD AUTO: 4.54 MILLION/UL (ref 3.81–5.12)
RBC #/AREA URNS AUTO: ABNORMAL /HPF
RBC MORPH BLD: PRESENT
SODIUM SERPL-SCNC: 140 MMOL/L (ref 135–147)
SP GR UR STRIP.AUTO: 1.01 (ref 1–1.03)
STOMATOCYTES BLD QL SMEAR: PRESENT
T WAVE AXIS: 65 DEGREES
T WAVE AXIS: 82 DEGREES
TSH SERPL DL<=0.05 MIU/L-ACNC: 4.08 UIU/ML (ref 0.45–4.5)
UROBILINOGEN UR STRIP-ACNC: <2 MG/DL
VENTRICULAR RATE: 74 BPM
VENTRICULAR RATE: 79 BPM
WBC # BLD AUTO: 11.34 THOUSAND/UL (ref 4.31–10.16)
WBC #/AREA URNS AUTO: ABNORMAL /HPF

## 2023-08-21 PROCEDURE — 70450 CT HEAD/BRAIN W/O DYE: CPT

## 2023-08-21 PROCEDURE — 36415 COLL VENOUS BLD VENIPUNCTURE: CPT | Performed by: EMERGENCY MEDICINE

## 2023-08-21 PROCEDURE — 83880 ASSAY OF NATRIURETIC PEPTIDE: CPT | Performed by: EMERGENCY MEDICINE

## 2023-08-21 PROCEDURE — 99214 OFFICE O/P EST MOD 30 MIN: CPT | Performed by: INTERNAL MEDICINE

## 2023-08-21 PROCEDURE — 80048 BASIC METABOLIC PNL TOTAL CA: CPT | Performed by: EMERGENCY MEDICINE

## 2023-08-21 PROCEDURE — 85007 BL SMEAR W/DIFF WBC COUNT: CPT | Performed by: EMERGENCY MEDICINE

## 2023-08-21 PROCEDURE — 71250 CT THORAX DX C-: CPT

## 2023-08-21 PROCEDURE — 96365 THER/PROPH/DIAG IV INF INIT: CPT

## 2023-08-21 PROCEDURE — 96361 HYDRATE IV INFUSION ADD-ON: CPT

## 2023-08-21 PROCEDURE — 87040 BLOOD CULTURE FOR BACTERIA: CPT | Performed by: EMERGENCY MEDICINE

## 2023-08-21 PROCEDURE — 99285 EMERGENCY DEPT VISIT HI MDM: CPT

## 2023-08-21 PROCEDURE — 84484 ASSAY OF TROPONIN QUANT: CPT | Performed by: EMERGENCY MEDICINE

## 2023-08-21 PROCEDURE — 81001 URINALYSIS AUTO W/SCOPE: CPT | Performed by: EMERGENCY MEDICINE

## 2023-08-21 PROCEDURE — 96367 TX/PROPH/DG ADDL SEQ IV INF: CPT

## 2023-08-21 PROCEDURE — 74176 CT ABD & PELVIS W/O CONTRAST: CPT

## 2023-08-21 PROCEDURE — 99223 1ST HOSP IP/OBS HIGH 75: CPT | Performed by: INTERNAL MEDICINE

## 2023-08-21 PROCEDURE — 71046 X-RAY EXAM CHEST 2 VIEWS: CPT

## 2023-08-21 PROCEDURE — 85027 COMPLETE CBC AUTOMATED: CPT | Performed by: EMERGENCY MEDICINE

## 2023-08-21 PROCEDURE — 80076 HEPATIC FUNCTION PANEL: CPT | Performed by: EMERGENCY MEDICINE

## 2023-08-21 PROCEDURE — 84145 PROCALCITONIN (PCT): CPT | Performed by: EMERGENCY MEDICINE

## 2023-08-21 PROCEDURE — 84443 ASSAY THYROID STIM HORMONE: CPT | Performed by: EMERGENCY MEDICINE

## 2023-08-21 PROCEDURE — 83735 ASSAY OF MAGNESIUM: CPT | Performed by: EMERGENCY MEDICINE

## 2023-08-21 PROCEDURE — G1004 CDSM NDSC: HCPCS

## 2023-08-21 PROCEDURE — 99285 EMERGENCY DEPT VISIT HI MDM: CPT | Performed by: EMERGENCY MEDICINE

## 2023-08-21 PROCEDURE — 83605 ASSAY OF LACTIC ACID: CPT | Performed by: EMERGENCY MEDICINE

## 2023-08-21 PROCEDURE — 93005 ELECTROCARDIOGRAM TRACING: CPT

## 2023-08-21 PROCEDURE — 93010 ELECTROCARDIOGRAM REPORT: CPT | Performed by: INTERNAL MEDICINE

## 2023-08-21 RX ORDER — SODIUM CHLORIDE 9 MG/ML
50 INJECTION, SOLUTION INTRAVENOUS CONTINUOUS
Status: DISCONTINUED | OUTPATIENT
Start: 2023-08-21 | End: 2023-08-24 | Stop reason: HOSPADM

## 2023-08-21 RX ORDER — FERROUS SULFATE 325(65) MG
325 TABLET ORAL
Status: DISCONTINUED | OUTPATIENT
Start: 2023-08-22 | End: 2023-08-24 | Stop reason: HOSPADM

## 2023-08-21 RX ORDER — ATORVASTATIN CALCIUM 40 MG/1
40 TABLET, FILM COATED ORAL
Status: DISCONTINUED | OUTPATIENT
Start: 2023-08-22 | End: 2023-08-24 | Stop reason: HOSPADM

## 2023-08-21 RX ORDER — LANOLIN ALCOHOL/MO/W.PET/CERES
3 CREAM (GRAM) TOPICAL
Status: DISCONTINUED | OUTPATIENT
Start: 2023-08-21 | End: 2023-08-24 | Stop reason: HOSPADM

## 2023-08-21 RX ORDER — PANTOPRAZOLE SODIUM 40 MG/1
40 TABLET, DELAYED RELEASE ORAL
Status: DISCONTINUED | OUTPATIENT
Start: 2023-08-22 | End: 2023-08-24 | Stop reason: HOSPADM

## 2023-08-21 RX ORDER — CEFTRIAXONE 1 G/50ML
1000 INJECTION, SOLUTION INTRAVENOUS EVERY 24 HOURS
Status: DISCONTINUED | OUTPATIENT
Start: 2023-08-22 | End: 2023-08-24 | Stop reason: HOSPADM

## 2023-08-21 RX ORDER — GABAPENTIN 100 MG/1
100 CAPSULE ORAL 4 TIMES DAILY
Status: DISCONTINUED | OUTPATIENT
Start: 2023-08-21 | End: 2023-08-22

## 2023-08-21 RX ORDER — MAGNESIUM SULFATE HEPTAHYDRATE 40 MG/ML
2 INJECTION, SOLUTION INTRAVENOUS ONCE
Status: COMPLETED | OUTPATIENT
Start: 2023-08-21 | End: 2023-08-21

## 2023-08-21 RX ORDER — HEPARIN SODIUM 5000 [USP'U]/ML
5000 INJECTION, SOLUTION INTRAVENOUS; SUBCUTANEOUS EVERY 8 HOURS SCHEDULED
Status: DISCONTINUED | OUTPATIENT
Start: 2023-08-21 | End: 2023-08-24 | Stop reason: HOSPADM

## 2023-08-21 RX ORDER — CEFTRIAXONE 1 G/50ML
1000 INJECTION, SOLUTION INTRAVENOUS ONCE
Status: COMPLETED | OUTPATIENT
Start: 2023-08-21 | End: 2023-08-21

## 2023-08-21 RX ORDER — MELOXICAM 15 MG/1
TABLET ORAL
COMMUNITY
Start: 2023-08-17

## 2023-08-21 RX ORDER — MECLIZINE HYDROCHLORIDE 25 MG/1
25 TABLET ORAL ONCE
Status: COMPLETED | OUTPATIENT
Start: 2023-08-21 | End: 2023-08-21

## 2023-08-21 RX ORDER — CLOPIDOGREL BISULFATE 75 MG/1
75 TABLET ORAL DAILY
Status: DISCONTINUED | OUTPATIENT
Start: 2023-08-22 | End: 2023-08-24 | Stop reason: HOSPADM

## 2023-08-21 RX ORDER — NICOTINE 21 MG/24HR
1 PATCH, TRANSDERMAL 24 HOURS TRANSDERMAL DAILY
Status: DISCONTINUED | OUTPATIENT
Start: 2023-08-22 | End: 2023-08-24 | Stop reason: HOSPADM

## 2023-08-21 RX ORDER — FOLIC ACID 1 MG/1
1 TABLET ORAL DAILY
Status: DISCONTINUED | OUTPATIENT
Start: 2023-08-22 | End: 2023-08-24 | Stop reason: HOSPADM

## 2023-08-21 RX ADMIN — MECLIZINE HYDROCHLORIDE 25 MG: 25 TABLET ORAL at 16:05

## 2023-08-21 RX ADMIN — SODIUM CHLORIDE 1000 ML: 0.9 INJECTION, SOLUTION INTRAVENOUS at 16:08

## 2023-08-21 RX ADMIN — SODIUM CHLORIDE 50 ML/HR: 0.9 INJECTION, SOLUTION INTRAVENOUS at 23:11

## 2023-08-21 RX ADMIN — MAGNESIUM SULFATE HEPTAHYDRATE 2 G: 40 INJECTION, SOLUTION INTRAVENOUS at 16:09

## 2023-08-21 RX ADMIN — CEFTRIAXONE 1000 MG: 1 INJECTION, SOLUTION INTRAVENOUS at 19:56

## 2023-08-21 NOTE — LETTER
[address] [place] [id number]  Dept: [phone]    August 24, 2023     Patient: Isabell Bravo   YOB: 1960   Date of Visit: 8/21/2023       To Whom it May Concern:    Isabell Bravo is under my professional care. She was seen in the hospital from 8/21/2023 to 08/24/23. She may return to work on 8/25/23 without limitations. She can participate in PT OT. If you have any questions or concerns, please don't hesitate to call.          Sincerely,          Shaila Johnson MD

## 2023-08-21 NOTE — PROGRESS NOTES
NEPHROLOGY OFFICE FOLLOW UP  Iyla Salguero 58 y.o. female MRN: [id number]    Encounter: [id number] 8/21/2023    REASON FOR VISIT: Ilya Salguero is a 58 y.o. female who is here on 8/21/2023 for Chronic Kidney Disease and Follow-up  . HPI:    Joan Partida came in today for follow-up of CKD. Patient with multiple sclerosis and CKD    Since I saw her last she was hospitalized syncopal episode was unknown etiology. She was admitted [facility]    Patient is overall not doing well and does not look quite well. Does look short of breath    Complaining of dizziness and unsteady gait. Patient had to physical therapy    She is also complaining of chest tightness    Feeling dizzy and weak      REVIEW OF SYSTEMS:    Review of Systems   Constitutional: Negative for activity change and fatigue. HENT: Negative for congestion and ear discharge. Eyes: Negative for photophobia and pain. Respiratory: Positive for shortness of breath. Negative for apnea and choking. Cardiovascular: Positive for chest pain. Negative for palpitations. Gastrointestinal: Negative for abdominal distention and blood in stool. Endocrine: Negative for heat intolerance and polyphagia. Genitourinary: Negative for flank pain and urgency. Musculoskeletal: Negative for neck pain and neck stiffness. Skin: Negative for color change and wound. Allergic/Immunologic: Negative for food allergies and immunocompromised state. Neurological: Positive for weakness and light-headedness. Negative for seizures and facial asymmetry. Hematological: Negative for adenopathy. Does not bruise/bleed easily. Psychiatric/Behavioral: Negative for self-injury and suicidal ideas.          PAST MEDICAL HISTORY:  Past Medical History:   Diagnosis Date   • Chronic kidney disease    • Glaucoma    • Hypertension    • Knee arthropathy 2023    Left Knee   • Migraine    • Multiple sclerosis ([address])    • Night muscle spasms    • Stroke ([address])     2 strokes in past,  and        PAST SURGICAL HISTORY:  Past Surgical History:   Procedure Laterality Date   • ESOPHAGOGASTRODUODENOSCOPY N/A 2016    Procedure: ESOPHAGOGASTRODUODENOSCOPY (EGD); Surgeon: Mason Cifuentes MD;  Location: [facility];   Service:    • HIP ARTHROPLASTY Bilateral        SOCIAL HISTORY:  Social History     Substance and Sexual Activity   Alcohol Use Yes     Social History     Substance and Sexual Activity   Drug Use No     Social History     Tobacco Use   Smoking Status Every Day   • Packs/day: 0.25   • Years: 10.00   • Total pack years: 2.50   • Types: Cigarettes   • Last attempt to quit: 2021   • Years since quittin.6   Smokeless Tobacco Never   Tobacco Comments    1 or 2 a week       FAMILY HISTORY:  Family History   Problem Relation Age of Onset   • No Known Problems Mother    • No Known Problems Father        MEDICATIONS:    Current Outpatient Medications:   •  atorvastatin (LIPITOR) 40 mg tablet, , Disp: , Rfl:   •  Aubagio 14 MG TABS, Take 1 tablet (14 mg total) by mouth in the morning, Disp: 30 tablet, Rfl: 6  •  Cholecalciferol 125 MCG (5000 UT) capsule, 5,000 Units, Disp: , Rfl:   •  clopidogrel (PLAVIX) 75 mg tablet, Take 75 mg by mouth daily, Disp: , Rfl:   •  cyclobenzaprine (FLEXERIL) 10 mg tablet, Take 1 tablet (10 mg total) by mouth 3 (three) times a day as needed for muscle spasms, Disp: 60 tablet, Rfl: 0  •  ferrous sulfate 325 (65 Fe) mg tablet, Take 65 mg by mouth, Disp: , Rfl:   •  folic acid (FOLVITE) 1 mg tablet, , Disp: , Rfl:   •  gabapentin (NEURONTIN) 600 MG tablet, 800 mg 4 (four) times a day, Disp: , Rfl:   •  hydrALAZINE (APRESOLINE) 50 mg tablet, Take 50 mg by mouth Three times a day, Disp: , Rfl:   •  losartan (COZAAR) 50 mg tablet, Take one tablet by mouth once a day, Disp: , Rfl:   •  melatonin 3 mg, Take 3 mg by mouth, Disp: , Rfl:   •  NIFEdipine ER (ADALAT CC) 60 MG 24 hr tablet, TAKE TWO TABLETS BY MOUTH EVERY DAY, Disp: , Rfl:   •  nitroglycerin (NITROSTAT) 0.4 mg SL tablet, PRN, Disp: , Rfl:   •  omeprazole (PriLOSEC) 20 mg delayed release capsule, omeprazole 20 mg capsule,delayed release, Disp: , Rfl:   •  meloxicam (MOBIC) 15 mg tablet, , Disp: , Rfl:     PHYSICAL EXAM:  Vitals:    08/21/23 1131   BP: (!) 80/60   BP Location: Right arm   Patient Position: Sitting   Pulse: 92   Resp: 16   Temp: (!) 96.9 °F (36.1 °C)   TempSrc: Temporal   SpO2: 98%   Weight: 90.7 kg (200 lb)   Height: 5' 11" (1.803 m)     Body mass index is 27.89 kg/m². Physical Exam  Constitutional:       General: She is not in acute distress. Appearance: She is well-developed. She is obese. She is ill-appearing. HENT:      Head: Normocephalic. Eyes:      General: No scleral icterus. Conjunctiva/sclera: Conjunctivae normal.   Neck:      Vascular: No JVD. Cardiovascular:      Rate and Rhythm: Normal rate. Heart sounds: Normal heart sounds. Pulmonary:      Effort: Pulmonary effort is normal.      Breath sounds: No wheezing. Abdominal:      Palpations: Abdomen is soft. Tenderness: There is no abdominal tenderness. Musculoskeletal:         General: Normal range of motion. Cervical back: Neck supple. Skin:     General: Skin is warm. Findings: No rash. Neurological:      Mental Status: She is alert and oriented to person, place, and time.    Psychiatric:         Behavior: Behavior normal.         LAB RESULTS:  Results for orders placed or performed in visit on 01/10/23   Protein / creatinine ratio, urine   Result Value Ref Range    PROTEIN UA 8.6     EXT Creatinine Urine 46.1     EXTERNAL Ur.Prot/Creat Ratio 0.19        ASSESSMENT and PLAN:      Stage 3 chronic kidney disease ([address])  Lab Results   Component Value Date    EGFR 39 11/18/2022    EGFR 53.0 08/10/2016    EGFR 53.5 08/10/2016    CREATININE 1.41 (H) 11/18/2022    CREATININE 1.26 08/10/2016    CREATININE 1.25 08/10/2016   Renal concerns stable with some fluctuation    Chronic kidney disease-mineral and bone disorder  Lab Results   Component Value Date    EGFR 39 11/18/2022    EGFR 53.0 08/10/2016    EGFR 53.5 08/10/2016    CREATININE 1.41 (H) 11/18/2022    CREATININE 1.26 08/10/2016    CREATININE 1.25 08/10/2016   PTH and phosphorus are within acceptable range    Essential hypertension  Blood pressure is running quite low. That along with chest tightness and patient overall feeling weak and dizzy and quite unsteady I think she should go to the hospital      Everything discussed at length with the patient. I will send to the hospital via ambulance because of overall medical condition with chest pain hypotension and not steady gait. Also called family        Portions of the record may have been created with voice recognition software. Occasional wrong word or "sound a like" substitutions may have occurred due to the inherent limitations of voice recognition software. Read the chart carefully and recognize, using context, where substitutions have occurred. If you have any questions, please contact the dictating provider.

## 2023-08-21 NOTE — ASSESSMENT & PLAN NOTE
Lab Results   Component Value Date    EGFR 39 11/18/2022    EGFR 53.0 08/10/2016    EGFR 53.5 08/10/2016    CREATININE 1.41 (H) 11/18/2022    CREATININE 1.26 08/10/2016    CREATININE 1.25 08/10/2016   PTH and phosphorus are within acceptable range

## 2023-08-21 NOTE — ASSESSMENT & PLAN NOTE
Lab Results   Component Value Date    EGFR 39 11/18/2022    EGFR 53.0 08/10/2016    EGFR 53.5 08/10/2016    CREATININE 1.41 (H) 11/18/2022    CREATININE 1.26 08/10/2016    CREATININE 1.25 08/10/2016   Renal concerns stable with some fluctuation

## 2023-08-21 NOTE — Clinical Note
Case was discussed with LINDA and the patient's admission status was agreed to be Admission Status: observation status to the service of Dr. Bustillos

## 2023-08-21 NOTE — ASSESSMENT & PLAN NOTE
Blood pressure is running quite low.   That along with chest tightness and patient overall feeling weak and dizzy and quite unsteady I think she should go to the hospital

## 2023-08-22 ENCOUNTER — APPOINTMENT (OUTPATIENT)
Dept: NON INVASIVE DIAGNOSTICS | Facility: HOSPITAL | Age: 63
DRG: 054 | End: 2023-08-22
Payer: COMMERCIAL

## 2023-08-22 LAB
ANION GAP SERPL CALCULATED.3IONS-SCNC: 8 MMOL/L
AORTIC ROOT: 3 CM
AORTIC VALVE MEAN VELOCITY: 16.7 M/S
APICAL FOUR CHAMBER EJECTION FRACTION: 58 %
ASCENDING AORTA: 2.6 CM
AV AREA BY CONTINUOUS VTI: 2.5 CM2
AV AREA PEAK VELOCITY: 2.4 CM2
AV LVOT MEAN GRADIENT: 5 MMHG
AV LVOT PEAK GRADIENT: 10 MMHG
AV MEAN GRADIENT: 12 MMHG
AV PEAK GRADIENT: 21 MMHG
AV VALVE AREA: 2.51 CM2
AV VELOCITY RATIO: 0.7
BUN SERPL-MCNC: 22 MG/DL (ref 5–25)
CALCIUM SERPL-MCNC: 9.6 MG/DL (ref 8.4–10.2)
CHLORIDE SERPL-SCNC: 108 MMOL/L (ref 96–108)
CHOLEST SERPL-MCNC: 196 MG/DL
CO2 SERPL-SCNC: 24 MMOL/L (ref 21–32)
CREAT SERPL-MCNC: 1.59 MG/DL (ref 0.6–1.3)
DOP CALC AO PEAK VEL: 2.27 M/S
DOP CALC AO VTI: 31.22 CM
DOP CALC LVOT AREA: 3.46 CM2
DOP CALC LVOT CARDIAC INDEX: 3.52 L/MIN/M2
DOP CALC LVOT CARDIAC OUTPUT: 7.44 L/MIN
DOP CALC LVOT DIAMETER: 2.1 CM
DOP CALC LVOT PEAK VEL VTI: 22.67 CM
DOP CALC LVOT PEAK VEL: 1.6 M/S
DOP CALC LVOT STROKE INDEX: 35.5 ML/M2
DOP CALC LVOT STROKE VOLUME: 78.48 CM3
E WAVE DECELERATION TIME: 252 MS
ERYTHROCYTE [DISTWIDTH] IN BLOOD BY AUTOMATED COUNT: 14.8 % (ref 11.6–15.1)
EST. AVERAGE GLUCOSE BLD GHB EST-MCNC: 140 MG/DL
FRACTIONAL SHORTENING: 29 % (ref 28–44)
GFR SERPL CREATININE-BSD FRML MDRD: 34 ML/MIN/1.73SQ M
GLUCOSE SERPL-MCNC: 116 MG/DL (ref 65–140)
HBA1C MFR BLD: 6.5 %
HCT VFR BLD AUTO: 37.1 % (ref 34.8–46.1)
HDLC SERPL-MCNC: 43 MG/DL
HGB BLD-MCNC: 11.8 G/DL (ref 11.5–15.4)
INTERVENTRICULAR SEPTUM IN DIASTOLE (PARASTERNAL SHORT AXIS VIEW): 1.5 CM
INTERVENTRICULAR SEPTUM: 1.5 CM (ref 0.6–1.1)
LAAS-AP2: 12.6 CM2
LAAS-AP4: 15.9 CM2
LDLC SERPL CALC-MCNC: 117 MG/DL (ref 0–100)
LEFT ATRIUM AREA SYSTOLE SINGLE PLANE A4C: 14.4 CM2
LEFT ATRIUM SIZE: 2.8 CM
LEFT ATRIUM VOLUME (MOD BIPLANE): 33 ML
LEFT INTERNAL DIMENSION IN SYSTOLE: 2.4 CM (ref 2.1–4)
LEFT VENTRICULAR INTERNAL DIMENSION IN DIASTOLE: 3.4 CM (ref 3.5–6)
LEFT VENTRICULAR POSTERIOR WALL IN END DIASTOLE: 1.4 CM
LEFT VENTRICULAR STROKE VOLUME: 26 ML
LVSV (TEICH): 26 ML
MCH RBC QN AUTO: 29.6 PG (ref 26.8–34.3)
MCHC RBC AUTO-ENTMCNC: 31.8 G/DL (ref 31.4–37.4)
MCV RBC AUTO: 93 FL (ref 82–98)
MV E'TISSUE VEL-SEP: 6 CM/S
MV PEAK A VEL: 0.88 M/S
MV PEAK E VEL: 55 CM/S
MV STENOSIS PRESSURE HALF TIME: 73 MS
MV VALVE AREA P 1/2 METHOD: 3.01 CM2
PLATELET # BLD AUTO: 268 THOUSANDS/UL (ref 149–390)
PMV BLD AUTO: 10.9 FL (ref 8.9–12.7)
POTASSIUM SERPL-SCNC: 4.5 MMOL/L (ref 3.5–5.3)
RBC # BLD AUTO: 3.98 MILLION/UL (ref 3.81–5.12)
RIGHT ATRIUM AREA SYSTOLE A4C: 14.7 CM2
RIGHT VENTRICLE ID DIMENSION: 3.2 CM
SL CV LEFT ATRIUM LENGTH A2C: 4.3 CM
SL CV PED ECHO LEFT VENTRICLE DIASTOLIC VOLUME (MOD BIPLANE) 2D: 46 ML
SL CV PED ECHO LEFT VENTRICLE SYSTOLIC VOLUME (MOD BIPLANE) 2D: 19 ML
SODIUM SERPL-SCNC: 140 MMOL/L (ref 135–147)
TRICUSPID ANNULAR PLANE SYSTOLIC EXCURSION: 2.2 CM
TRIGL SERPL-MCNC: 178 MG/DL
WBC # BLD AUTO: 9.83 THOUSAND/UL (ref 4.31–10.16)

## 2023-08-22 PROCEDURE — 99215 OFFICE O/P EST HI 40 MIN: CPT | Performed by: STUDENT IN AN ORGANIZED HEALTH CARE EDUCATION/TRAINING PROGRAM

## 2023-08-22 PROCEDURE — 97163 PT EVAL HIGH COMPLEX 45 MIN: CPT

## 2023-08-22 PROCEDURE — 36415 COLL VENOUS BLD VENIPUNCTURE: CPT | Performed by: INTERNAL MEDICINE

## 2023-08-22 PROCEDURE — 97167 OT EVAL HIGH COMPLEX 60 MIN: CPT

## 2023-08-22 PROCEDURE — 83036 HEMOGLOBIN GLYCOSYLATED A1C: CPT | Performed by: INTERNAL MEDICINE

## 2023-08-22 PROCEDURE — 93306 TTE W/DOPPLER COMPLETE: CPT

## 2023-08-22 PROCEDURE — 80048 BASIC METABOLIC PNL TOTAL CA: CPT | Performed by: INTERNAL MEDICINE

## 2023-08-22 PROCEDURE — 99233 SBSQ HOSP IP/OBS HIGH 50: CPT | Performed by: FAMILY MEDICINE

## 2023-08-22 PROCEDURE — 93306 TTE W/DOPPLER COMPLETE: CPT | Performed by: INTERNAL MEDICINE

## 2023-08-22 PROCEDURE — 85027 COMPLETE CBC AUTOMATED: CPT | Performed by: INTERNAL MEDICINE

## 2023-08-22 PROCEDURE — 80061 LIPID PANEL: CPT | Performed by: INTERNAL MEDICINE

## 2023-08-22 RX ORDER — METOCLOPRAMIDE HYDROCHLORIDE 5 MG/ML
10 INJECTION INTRAMUSCULAR; INTRAVENOUS ONCE
Status: COMPLETED | OUTPATIENT
Start: 2023-08-22 | End: 2023-08-22

## 2023-08-22 RX ORDER — GABAPENTIN 400 MG/1
800 CAPSULE ORAL 4 TIMES DAILY
Status: DISCONTINUED | OUTPATIENT
Start: 2023-08-22 | End: 2023-08-24 | Stop reason: HOSPADM

## 2023-08-22 RX ORDER — MAGNESIUM SULFATE HEPTAHYDRATE 40 MG/ML
2 INJECTION, SOLUTION INTRAVENOUS ONCE
Status: COMPLETED | OUTPATIENT
Start: 2023-08-22 | End: 2023-08-22

## 2023-08-22 RX ORDER — LORAZEPAM 2 MG/ML
0.5 INJECTION INTRAMUSCULAR ONCE
Status: COMPLETED | OUTPATIENT
Start: 2023-08-22 | End: 2023-08-23

## 2023-08-22 RX ADMIN — CLOPIDOGREL BISULFATE 75 MG: 75 TABLET ORAL at 08:00

## 2023-08-22 RX ADMIN — GABAPENTIN 800 MG: 400 CAPSULE ORAL at 18:26

## 2023-08-22 RX ADMIN — ATORVASTATIN CALCIUM 40 MG: 40 TABLET, FILM COATED ORAL at 18:25

## 2023-08-22 RX ADMIN — MAGNESIUM SULFATE HEPTAHYDRATE 2 G: 40 INJECTION, SOLUTION INTRAVENOUS at 11:39

## 2023-08-22 RX ADMIN — Medication 3 MG: at 21:12

## 2023-08-22 RX ADMIN — GABAPENTIN 100 MG: 100 CAPSULE ORAL at 08:00

## 2023-08-22 RX ADMIN — FERROUS SULFATE TAB 325 MG (65 MG ELEMENTAL FE) 325 MG: 325 (65 FE) TAB at 08:00

## 2023-08-22 RX ADMIN — GABAPENTIN 800 MG: 400 CAPSULE ORAL at 11:39

## 2023-08-22 RX ADMIN — FOLIC ACID 1 MG: 1 TABLET ORAL at 08:00

## 2023-08-22 RX ADMIN — METOCLOPRAMIDE 10 MG: 5 INJECTION, SOLUTION INTRAMUSCULAR; INTRAVENOUS at 11:39

## 2023-08-22 RX ADMIN — HEPARIN SODIUM 5000 UNITS: 5000 INJECTION INTRAVENOUS; SUBCUTANEOUS at 21:11

## 2023-08-22 RX ADMIN — PANTOPRAZOLE SODIUM 40 MG: 40 TABLET, DELAYED RELEASE ORAL at 08:00

## 2023-08-22 RX ADMIN — HEPARIN SODIUM 5000 UNITS: 5000 INJECTION INTRAVENOUS; SUBCUTANEOUS at 15:13

## 2023-08-22 RX ADMIN — SODIUM CHLORIDE 50 ML/HR: 0.9 INJECTION, SOLUTION INTRAVENOUS at 20:26

## 2023-08-22 RX ADMIN — CEFTRIAXONE 1000 MG: 1 INJECTION, SOLUTION INTRAVENOUS at 18:27

## 2023-08-22 RX ADMIN — HEPARIN SODIUM 5000 UNITS: 5000 INJECTION INTRAVENOUS; SUBCUTANEOUS at 05:57

## 2023-08-22 NOTE — ASSESSMENT & PLAN NOTE
· Creatinine elevated 1.84 on admission (baseline around 1.5 per care everywhere)  · Likely in the setting of UTI  · Start on gentle IVF overnight  · Monitor I&O  · Repeat AM BMP

## 2023-08-22 NOTE — ASSESSMENT & PLAN NOTE
· UA with WBC and innumerable bacteria  · Does not meet sepsis criteria  · Start on IV ceftriaxone  · Follow up urine culture

## 2023-08-22 NOTE — CASE MANAGEMENT
Case Management Assessment & Discharge Planning Note    Patient name Alvin Bolden  Location ED 08/ED 08 MRN [id number]  : 1960 Date 2023       Current Admission Date: 2023  Current Admission Diagnosis:Stroke-like symptoms   Patient Active Problem List    Diagnosis Date Noted   • UTI (urinary tract infection) 2023   • Fall 2023   • Cervical spondylosis 2023   • Cervical radiculopathy 2023   • Hypercalcemia 2023   • Stage 3 chronic kidney disease ([address]) 2021   • Chronic kidney disease-mineral and bone disorder 2021   • JOVANA (acute kidney injury) ([address]) 2021   • Lumbar radiculopathy 2020   • Multiple sclerosis ([address]) 2017   • Bradycardia 2016   • Stroke ([address]) 2016   • Essential hypertension 2016   • Abdominal pain 2016   • Nausea and/or vomiting 2016   • Syncope 2016   • Stroke-like symptoms 2016   • Adrenal mass, right ([address]) 10/06/2015   • Cardiomegaly 2015      LOS (days): 0  Geometric Mean LOS (GMLOS) (days):   Days to GMLOS:     OBJECTIVE:        Current admission status: Observation    Preferred Pharmacy:   [facility] [id number]  [address] [facility] [address]  Phone: [phone] Fax: [address] [facility], [address] [address]  Phone: [phone] Fax: [phone]    Primary Care Provider: Cullen Schwab, MD    Primary Insurance: [facility]  Secondary Insurance:     ASSESSMENT:  Clint Proxies    There are no active Health Care Proxies on file.        Advance Directives  Does patient have a [address]?: No  Was patient offered paperwork?: Yes (Declined)  Does patient currently have a Health Care decision maker?: No  Does patient have Advance Directives?: No  Was patient offered paperwork?: Yes (Declines)  Primary Contact: Danny Spence (Friend)   [phone] (Mobile)    Readmission Root Cause  30 Day Readmission: No    Patient Information  Admitted from[de-identified] Other (comment) (From Nephrologist office)  Mental Status: Alert  During Assessment patient was accompanied by: Not accompanied during assessment  Assessment information provided by[de-identified] Patient  Primary Caregiver: Self  Support Systems: Friends/neighbors, Count includes the [facility] of Residence: [address] [place] [address] do you live in?: [address] [facility] entry access options. Select all that apply.: Stairs  Number of steps to enter home.: 4  Do the steps have railings?: Yes  Type of Current Residence: [facility]  In the last 12 months, was there a time when you were not able to pay the mortgage or rent on time?: No  In the last 12 months, how many places have you lived?: 1  In the last 12 months, was there a time when you did not have a steady place to sleep or slept in a shelter (including now)?: No  Homeless/housing insecurity resource given?: N/A  Living Arrangements: Lives w/ Friend  Is patient a ?: No    Activities of Daily Living Prior to Admission  Functional Status: Independent  Completes ADLs independently?: Yes  Ambulates independently?: Yes  Does patient use assisted devices?: Yes  Assisted Devices (DME) used: Dereje Brooke  Does patient currently own DME?: Yes  What DME does the patient currently own?: Dereje Pascual, Bedside Com[employer]  Does patient have a history of Outpatient Therapy (PT/OT)?: Yes ([facility])  Does the patient have a history of Short-Term Rehab?: No  Does patient have a history of HHC?: No  Does patient currently have [address]?: No    Patient Information Continued  Income Source: SSI/SSD  Does patient have prescription coverage?: Yes  Within the past 12 months, you worried that your food would run out before you got the money to buy more.: Never true  Within the past 12 months, the food you bought just didn't last and you didn't have money to get more. Adi Prince Never true  Food insecurity resource given?: N/A  Does patient receive dialysis treatments?: No  Does patient have a history of substance abuse?: No  Does patient have a history of Mental Health Diagnosis?: No    Means of Transportation  Means of Transport to Appts[de-identified] Drives Self  In the past 12 months, has lack of transportation kept you from medical appointments or from getting medications?: No  In the past 12 months, has lack of transportation kept you from meetings, work, or from getting things needed for daily living?: No    DISCHARGE DETAILS:    Discharge planning discussed with[de-identified] Patient  Freedom of Choice: Yes  Comments - Freedom of Choice: Pending PT/OT evals for DC recs  CM contacted family/caregiver?: No- see comments (AAO x3)    Treatment Team Recommendation:  (Pending PT/OT evals)     Additional Comments: Met with patient at bedside in ED. Patient reports she was at Nephrologists office yesterday and was sent to ED for eval.  Patient's truck is parked at MD office since she drove self to appointment. Waiting PT/OT evals for discharge recs.   CM department following thru discharge

## 2023-08-22 NOTE — ASSESSMENT & PLAN NOTE
· Continue teriflunomide. Not on hospital formulary.  Patient reports that if she is here longer than tomorrow she can have her roommate bring in her prescription from home  · Outpatient Neurology follow up

## 2023-08-22 NOTE — PLAN OF CARE
Problem: PHYSICAL THERAPY ADULT  Goal: Performs mobility at highest level of function for planned discharge setting. See evaluation for individualized goals. Description: Treatment/Interventions: Functional transfer training, LE strengthening/ROM, Elevations, Therapeutic exercise, Endurance training, Patient/family training, Bed mobility, Gait training, Spoke to nursing, OT          See flowsheet documentation for full assessment, interventions and recommendations. Note: Prognosis: Good  Problem List: Decreased strength, Decreased endurance, Impaired balance, Decreased mobility, Impaired sensation, Pain  Assessment: Pt is 58year old female seen for PT evaluation s/p admit to [id number] [place] [address] on 8/21/2023 with Stroke-like symptoms. PT consulted to assess pt's functional mobility and discharge needs. Order placed for PT evaluation and treatment. Comorbidities affecting pt's physical performance at time of assessment include essential hypertension, multiple sclerosis, JOVANA, UTI, and fall. Prior to hospitalization, pt was independent with all functional mobility with a cane. Pt ambulates unrestricted distances on all terrain and elevations. Pt resides with her roommate, in a one level house, with 4 steps to enter. Personal factors affecting pt at time of initial evaluation include stairs to enter home, ambulating with an assistive device, inability to ambulate community distances, inability to navigate level surfaces without external assistance, unable to perform dynamic tasks in the community, limited home support, inability to live alone, positive fall history, difficulty performing ADLs, and inability to perform IADLs.  Please find objective findings from PT assessment regarding body systems outlined above with impairments and limitations including weakness, impaired balance, decreased endurance, gait deviations, pain, decreased activity tolerance, decreased functional mobility tolerance, altered sensation, and fall risk. The following objective measures were performed on initial evaluation Barthel Index: 45/100, Modified [place]: 4 (moderate/severe disability) and AM-PAC 6-Clicks: 41/44. Pt's clinical presentation is currently unstable/unpredictable seen in pt's presentation of need for ongoing medical management/monitoring, pt is a fall risk, pt requires use of RW for safe ambulation, and pt requires cues and assist for safety with functional mobility. Pt to benefit from continued PT treatment to address deficits as defined above and maximize pt's level of function and independence with mobility. From a PT standpoint, recommendation at time of discharge would be STR pending pt's progress in order to facilitate return to prior level of function. Barriers to Discharge: Inaccessible home environment, Decreased caregiver support     PT Discharge Recommendation: Post acute rehabilitation services    See flowsheet documentation for full assessment.

## 2023-08-22 NOTE — ASSESSMENT & PLAN NOTE
· Presented with dizziness and double vision  · CT head wo contrast: no acute abnormality  · On stroke pathway: MRI, ECHO, PT OT , neurology, speech  · Allergy to ASA noted, cont statin and plavix  · Tele monitoring  · Neurology consult

## 2023-08-22 NOTE — ASSESSMENT & PLAN NOTE
- UA concerning for UTI  - Blood cultures and urine culture pending  - Currently on Rocephin  - Medical management per primary team

## 2023-08-22 NOTE — ASSESSMENT & PLAN NOTE
· Reports fall today due to dizziness, see treatment under stroke above  · Fall precautions  · PT/OT evaluation

## 2023-08-22 NOTE — H&P
[address]  H&P  Name: Theresa Richardson 58 y.o. female I MRN: [id number]  Unit/Bed#: ED 08 I Date of Admission: 8/21/2023   Date of Service: 8/21/2023 I Hospital Day: 0      Assessment/Plan   * Stroke-like symptoms  Assessment & Plan  Patient presenting to the ED for acute onset of dizziness and double vision that started this morning. Patient does report a fall today due to the dizziness. Patient has a history of CVA and is with residual left-sided weakness. No worsening weakness noted by patient. Denies any facial droop, slurred speech, numbness/tingling. · CT head: No acute intracranial abnormality  · ECG: NSR without acute ischemic changes  · On exam patient still reports dizziness. She is with double vision on exam with near and far assessments. She has external deviation of the right eye (however reports this is chronic following her cataract surgery). No other acute neuro deficits noted on exam.   · Troponin level: 0hr 12  · Follow stroke pathway  · Continue statin and plavix. Reports allergy to asa  · Obtain MRI and echo  · PT/OT evaluation  · Monitor on telemetry  · Consult to Neurology, recommendations are appreciated    UTI (urinary tract infection)  Assessment & Plan  · UA with WBC and innumerable bacteria  · Does not meet sepsis criteria  · Start on IV ceftriaxone  · Follow up urine culture    JOVANA (acute kidney injury) ([address])  Assessment & Plan  · Creatinine elevated 1.84 on admission (baseline around 1.5 per care everywhere)  · Likely in the setting of UTI  · Start on gentle IVF overnight  · Monitor I&O  · Repeat AM BMP    Fall  Assessment & Plan  · Reports fall today due to dizziness, see treatment under stroke above  · Fall precautions  · PT/OT evaluation    Multiple sclerosis ([address])  Assessment & Plan  · Continue teriflunomide. Not on hospital formulary.  Patient reports that if she is here longer than tomorrow she can have her roommate bring in her prescription from home  · Outpatient Neurology follow up    Essential hypertension  Assessment & Plan  · Reported to be hypotensive in Nephrology office today with SBP 70s  · Normotensive since arrival to the ED, /78  · Hold home anti-hypertensive regimen per stroke protocol  · Monitor vitals per routine       VTE Pharmacologic Prophylaxis: VTE Score: 11 Moderate Risk (Score 3-4) - Pharmacological DVT Prophylaxis Ordered: heparin. Code Status: Level 1 - Full Code   Discussion with family: Update in the AM    Anticipated Length of Stay: Patient will be admitted on an observation basis with an anticipated length of stay of less than 2 midnights secondary to stroke like symptoms. Chief Complaint: Dizziness    History of Present Illness:  Isabell Bravo is a 58 y.o. female with a PMH of hypertension, CKD, multiple sclerosis, history of CVA. Patient presenting to the ED for cute onset of dizziness and double vision that started this morning. Patient does report a fall today due to the dizziness. Patient has a history of CVA and is with residual left-sided weakness. No worsening weakness noted by patient. Denies any facial droop, slurred speech, numbness/tingling. Patient requiring medical mission for stroke pathway and neurology consultation as well as IV antibiotics for treatment of UTI. All patient questions answered to the best of my ability. Review of Systems:  Review of Systems   Constitutional: Negative for chills and fever. HENT: Negative for ear pain and sore throat. Eyes: Positive for visual disturbance. Negative for pain. Respiratory: Negative for cough and shortness of breath. Cardiovascular: Negative for chest pain and palpitations. Gastrointestinal: Negative for abdominal pain and vomiting. Genitourinary: Negative for dysuria and hematuria. Musculoskeletal: Negative for arthralgias and back pain. Skin: Negative for color change and rash. Neurological: Positive for dizziness.  Negative for seizures and syncope. All other systems reviewed and are negative. Past Medical and Surgical History:   Past Medical History:   Diagnosis Date   • Chronic kidney disease    • Glaucoma    • Hypertension    • Knee arthropathy 2023    Left Knee   • Migraine    • Multiple sclerosis ([address])    • Night muscle spasms    • Stroke ([address])     2 strokes in past, 2011 and 2013       Past Surgical History:   Procedure Laterality Date   • ESOPHAGOGASTRODUODENOSCOPY N/A 8/9/2016    Procedure: ESOPHAGOGASTRODUODENOSCOPY (EGD); Surgeon: Christo Gallardo MD;  Location: [facility]; Service:    • HIP ARTHROPLASTY Bilateral        Meds/Allergies:  Prior to Admission medications    Medication Sig Start Date End Date Taking?  Authorizing Provider   atorvastatin (LIPITOR) 40 mg tablet  10/19/20   Historical Provider, MD   Aubagio 14 MG TABS Take 1 tablet (14 mg total) by mouth in the morning 3/13/23   Dipti Smith MD   Cholecalciferol 125 MCG (5000 UT) capsule 5,000 Units 4/2/21   Historical Provider, MD   clopidogrel (PLAVIX) 75 mg tablet Take 75 mg by mouth daily    Historical Provider, MD   cyclobenzaprine (FLEXERIL) 10 mg tablet Take 1 tablet (10 mg total) by mouth 3 (three) times a day as needed for muscle spasms 11/9/20   Devin Craig MD   ferrous sulfate 325 (65 Fe) mg tablet Take 65 mg by mouth 9/21/11   Historical Provider, MD   folic acid (FOLVITE) 1 mg tablet  6/7/21   Historical Provider, MD   gabapentin (NEURONTIN) 600 MG tablet 800 mg 4 (four) times a day 10/27/20   Historical Provider, MD   hydrALAZINE (APRESOLINE) 50 mg tablet Take 50 mg by mouth Three times a day 6/8/20   Historical Provider, MD   losartan (COZAAR) 50 mg tablet Take one tablet by mouth once a day 6/8/20   Historical Provider, MD   melatonin 3 mg Take 3 mg by mouth 1/24/23 1/24/24  Historical Provider, MD   meloxicam (MOBIC) 15 mg tablet  8/17/23   Historical Provider, MD   NIFEdipine ER (ADALAT CC) 60 MG 24 hr tablet TAKE TWO TABLETS BY MOUTH EVERY DAY 10/26/20   Historical Provider, MD   nitroglycerin (NITROSTAT) 0.4 mg SL tablet PRN 10/19/20   Historical Provider, MD   omeprazole (PriLOSEC) 20 mg delayed release capsule omeprazole 20 mg capsule,delayed release    Historical Provider, MD   LORazepam (ATIVAN) 1 mg tablet Two p.o. 1 hour before MRI, may repeat 1 after 1 hour p.r.n. Patient not taking: Reported on 2023 10/19/22 8/21/23  Paul Armenta MD     I have reviewed home medications using recent Epic encounter. Allergies: Allergies   Allergen Reactions   • Acetaminophen Swelling   • Aspirin Swelling   • Lisinopril Swelling       Social History:  Marital Status: /Civil Union   Occupation: NA  Patient Pre-hospital Living Situation: Home  Patient Pre-hospital Level of Mobility: walks with person assist  Patient Pre-hospital Diet Restrictions: None  Substance Use History:   Social History     Substance and Sexual Activity   Alcohol Use Yes     Social History     Tobacco Use   Smoking Status Every Day   • Packs/day: 0.25   • Years: 10.00   • Total pack years: 2.50   • Types: Cigarettes   • Last attempt to quit: 2021   • Years since quittin.6   Smokeless Tobacco Never   Tobacco Comments    1 or 2 a week     Social History     Substance and Sexual Activity   Drug Use No       Family History:  Family History   Problem Relation Age of Onset   • No Known Problems Mother    • No Known Problems Father        Physical Exam:     Vitals:   Blood Pressure: 143/78 (23)  Pulse: 81 (23)  Temperature: 98.1 °F (36.7 °C) (23 1255)  Temp Source: Temporal (23 1255)  Respirations: 20 (23)  SpO2: 92 % (23)    Physical Exam  Vitals and nursing note reviewed. Constitutional:       General: She is not in acute distress. Appearance: She is well-developed. HENT:      Head: Normocephalic and atraumatic.    Eyes:      Conjunctiva/sclera: Conjunctivae normal.   Cardiovascular:      Rate and Rhythm: Normal rate and regular rhythm. Heart sounds: No murmur heard. Pulmonary:      Effort: Pulmonary effort is normal. No respiratory distress. Breath sounds: Normal breath sounds. Abdominal:      Palpations: Abdomen is soft. Tenderness: There is no abdominal tenderness. Musculoskeletal:         General: No swelling. Cervical back: Neck supple. Skin:     General: Skin is warm and dry. Capillary Refill: Capillary refill takes less than 2 seconds. Neurological:      Mental Status: She is alert. Mental status is at baseline. Cranial Nerves: Cranial nerves 2-12 are intact. Sensory: Sensation is intact. Motor: Weakness (Residual left sided weakness from prior stroke. Patient reports weakness is not wrosened from previous. No right sided weakness noted.) present. Coordination: Coordination is intact.    Psychiatric:         Mood and Affect: Mood normal.          Additional Data:     Lab Results:  Results from last 7 days   Lab Units 08/21/23  1545   WBC Thousand/uL 11.34*   HEMOGLOBIN g/dL 13.4   HEMATOCRIT % 42.1   PLATELETS Thousands/uL 306   LYMPHO PCT % 26   MONO PCT % 4   EOS PCT % 5     Results from last 7 days   Lab Units 08/21/23  1545   SODIUM mmol/L 140   POTASSIUM mmol/L 3.9   CHLORIDE mmol/L 104   CO2 mmol/L 27   BUN mg/dL 14   CREATININE mg/dL 1.84*   ANION GAP mmol/L 9   CALCIUM mg/dL 10.7*   ALBUMIN g/dL 4.9   TOTAL BILIRUBIN mg/dL 0.35   ALK PHOS U/L 136*   ALT U/L 17   AST U/L 21   GLUCOSE RANDOM mg/dL 103                 Results from last 7 days   Lab Units 08/21/23  1923 08/21/23  1703   LACTIC ACID mmol/L  --  1.4   PROCALCITONIN ng/ml 0.08  --        Lines/Drains:  Invasive Devices     Peripheral Intravenous Line  Duration           Peripheral IV 08/21/23 Right Antecubital <1 day                    Imaging: Reviewed radiology reports from this admission including: chest CT scan, abdominal/pelvic CT and CT head  CT head wo contrast   Final Result by Jaswinder Wilson MD (08/21 1732)      No acute intracranial abnormality. Workstation performed: [id number]         CT chest abdomen pelvis wo contrast   Final Result by CK Abraham MD (08/21 1848)   No acute intrathoracic or intra-abdominal injury. Small amount of air in the suboptimally visualized bladder may be due to recent instrumentation. Correlate clinically. 3 mm left lower lobe nodule. Based on current Fleischner Society 2017 Guidelines on incidental pulmonary nodule, because the patient is considered high risk for lung cancer, 12 month follow-up non-contrast chest CT is recommended. Study marked in epic for notification and follow-up. Workstation performed: [id number]         XR chest pa & lateral   ED Interpretation by Walker Almanzar DO (08/21 1851)   No acute abnormality in the chest.      MRI Inpatient Order    (Results Pending)       EKG and Other Studies Reviewed on Admission:   · EKG: NSR. HR 74.    ** Please Note: This note has been constructed using a voice recognition system.  **

## 2023-08-22 NOTE — PLAN OF CARE
Problem: OCCUPATIONAL THERAPY ADULT  Goal: Performs self-care activities at highest level of function for planned discharge setting. See evaluation for individualized goals. Description: Treatment Interventions: ADL retraining, Functional transfer training, UE strengthening/ROM, Endurance training, Patient/family training, Equipment evaluation/education, Compensatory technique education, Continued evaluation, Energy conservation, Activityengagement          See flowsheet documentation for full assessment, interventions and recommendations. Note: Limitation: Decreased ADL status, Decreased Safe judgement during ADL, Decreased UE ROM, Decreased endurance, Decreased self-care trans, Decreased high-level ADLs  Prognosis: Good  Assessment: Patient is a 58 y.o. female seen for OT evaluation s/p admit [place] on 8/21/2023 w/Stroke-like symptoms. Commorbidities affecting patient's functional performance at time of assessment include: essential hypertension, multiple sclerosis, JOVANA, UTI, and fall. Orders placed for OT evaluation and treatment. Performed at least two patient identifiers during session including name and wristband. Prior to admission, Patient reports independent  with ADLs/ IADLs. Patient ambulates with a cane, lives in a mobile home/ [address]/ with a roommate. Personal factors affecting patient at time of initial evaluation include: limited caregiver support, steps to enter, limited insight into deficits, difficulty performing ADLs and difficulty performing IADLs. Upon evaluation, patient requires minimal  and moderate assist for UB ADLs, maximal assist for LB ADLs, transfers and functional ambulation in room and bathroom with moderate assist x 2, with the use of Rolling Walker.    Occupational performance is affected by the following deficits: decreased functional use of BUEs, degenerative arthritic joint changes, dynamic sit/ stand balance deficit with poor standing tolerance time for self care and functional mobility, decreased activity tolerance, decreased safety awareness and postural control and postural alignment deficit, requiring external assistance to complete transitional movements. Patient to benefit from continued Occupational Therapy treatment while in the hospital to address deficits as defined above and maximize level of functional independence with ADLs and functional mobility. Occupational Performance areas to address include: bathing/ shower, dressing, toilet hygiene, transfer to all surfaces, functional mobility, emergency response, health maintenance, IADLs: safety procedures, IADLs: meal prep/ clean up and Leisure Participation. From OT standpoint, recommendation at time of d/c would be Short Term Rehab.      OT Discharge Recommendation: Post acute rehabilitation services

## 2023-08-22 NOTE — ASSESSMENT & PLAN NOTE
Patient presenting to the ED for acute onset of dizziness and double vision that started this morning. Patient does report a fall today due to the dizziness. Patient has a history of CVA and is with residual left-sided weakness. No worsening weakness noted by patient. Denies any facial droop, slurred speech, numbness/tingling. · CT head: No acute intracranial abnormality  · ECG: NSR without acute ischemic changes  · On exam patient still reports dizziness. She is with double vision on exam with near and far assessments. She has external deviation of the right eye (however reports this is chronic following her cataract surgery). No other acute neuro deficits noted on exam.   · Troponin level: 0hr 12  · Follow stroke pathway  · Continue statin and plavix.  Reports allergy to asa  · Obtain MRI and echo  · PT/OT evaluation  · Monitor on telemetry  · Consult to Neurology, recommendations are appreciated

## 2023-08-22 NOTE — ASSESSMENT & PLAN NOTE
· BP (!) 145/102   Pulse 83   Temp 98.1 °F (36.7 °C) (Temporal)   Resp 20   SpO2 95%   · Elevated pressures  · Cont losartan, nifedipine, hydralazine

## 2023-08-22 NOTE — UTILIZATION REVIEW
Initial Clinical Review    OBSERVATION 8/21 CHANGED TO INPATIENT ON 8/23 @ 4034 - stroke work up. Admission: Date/Time/Statement:   Admission Orders (From admission, onward)     Ordered        08/23/23 1232  Inpatient Admission  Once                      Orders Placed This Encounter   Procedures   • Inpatient Admission     Standing Status:   Standing     Number of Occurrences:   1     Order Specific Question:   Level of Care     Answer:   Med Surg [16]     Order Specific Question:   Estimated length of stay     Answer:   More than 2 Midnights     Order Specific Question:   Certification     Answer:   I certify that inpatient services are medically necessary for this patient for a duration of greater than two midnights. See H&P and MD Progress Notes for additional information about the patient's course of treatment. ED Arrival Information     Expected   -    Arrival   8/21/2023 12:33    Acuity   Urgent            Means of arrival   Ambulance    Escorted by   [facility])    Service   Hospitalist    Admission type   Emergency            Arrival complaint   Dizziness           Chief Complaint   Patient presents with   • Hypotension     Pt presents with c/o being at nephrology this morning and recommended to come in for further eval of hypotension in office. Reports feeling of dizziness, and weakness. Recent hospitalization for syncope. Initial Presentation: 58 y.o. female presents to the ED via EMS From Nephrology office w/ c/o acute onset dizziness and double vision since AM with fall d/t dizziness. PMH: HTN, CKD, MS, h/o CVA w/ residual L side weakness. In the ED labs - leukocytosis, elevated creat, calcium, alk phos, abnormal UA. Imaging - no acute disease. Treated with IV fluids, IV Mag x 2, Meclizine, IV antibiotics, Plavix, iron, folvite, PPI, sq Heparin, gabapentin, IV Reglan. On exam no change in chronic L side weakness.   She is admitted to OBSERVATION status with Stroke-like symptoms - stroke pathway, MRI Brain, Echo, neuro consult ,tele. UTI - IV antibiotics, urine culture. JOVANA - IV fluids overnight. Fall - therapy evals. HTN - hold home antihypertensives d/c stroke pathway. Date: 8/22:  Does not meet sepsis criteria. Continue IV antibiotics til urine culture resulted, IV fluids continue, JOVANA improving. BP elevated. On exam has headache and dizziness improving. No new focal deficits. Therapy recommending post d/c rehab. Pt is declining rehab and wants to continue OP PT at [facility].      8/22 Neuro Consult - hypotension in Neph office, double vision, dizziness, fall, unclear etiology. Initial imaging negative for CVA. MRI Brain pending. Continue stroke pathway, blood and urine cultures, headache mgmt. Continue UTI tx w/ antibiotics. On exam cervice back tenderness, recently got new glasses, has R exotropia and palsy with right eye adduction, Decreased sensation to temperature and light touch in L UE/LE. Date: 8/23: CHANGED TO INPATIENT STATUS   MRI pending. Pt remains on IV antibiotics, VS stable. Continue on Tele, JOVANA resolved. BP improved. On exam today still with headache rated 8/10 with lightheadedness and dizziness, left sided motor and sensory weakness noted. Echo - concentric hypertrophy severe to moderate 70% hyperdynamic. Date: 8/24  Day 2 IP:   MRI, MRA head, carotids negative. Pt is d/c to home today.       ED Triage Vitals   Temperature Pulse Respirations Blood Pressure SpO2   08/21/23 1255 08/21/23 1255 08/21/23 1255 08/21/23 1255 08/21/23 1255   98.1 °F (36.7 °C) 83 22 104/67 94 %      Temp Source Heart Rate Source Patient Position - Orthostatic VS BP Location FiO2 (%)   08/21/23 1255 08/21/23 1255 08/21/23 1255 08/21/23 1255 --   Temporal Monitor Sitting Left arm       Pain Score       08/21/23 2100       7          Wt Readings from Last 1 Encounters:   08/24/23 92.2 kg (203 lb 4.2 oz)     Additional Vital Signs:   08/24/23 07:48:43 97.7 °F (36.5 °C) 74 -- 157/101 Abnormal  120 95 % None (Room air) --   08/24/23 0612 -- -- -- 148/98 -- -- -- --   08/24/23 0257 98.3 °F (36.8 °C) 66 18 149/98 115 94 % None (Room air) Lying   08/23/23 2300 98.1 °F (36.7 °C) 79 18 144/85 105 94 % None (Room air) Lying   08/23/23 22:34:28 -- 86 18 144/85 105 93 % -- --   08/23/23 19:36:20 -- 94 -- 175/104 Abnormal  128 97 % -- --   08/23/23 1900 98.4 °F (36.9 °C) 94 18 175/104 Abnormal  128 93 % None (Room air) Lying   08/23/23 1500 98.2 °F (36.8 °C) 91 18 142/86 -- 93 % None (Room air) Lying   08/23/23 1100 -- 77 -- 145/86 -- -- -- Lying   08/23/23 0945 -- -- -- -- -- 98 % None (Room air) --     08/23/23 0700 97.4 °F (36.3 °C) Abnormal  69 16 150/99 116 95 % None (Room air) Lying   08/23/23 03:02:51 98.2 °F (36.8 °C) 63 16 141/91 108 98 % None (Room air) Lying   08/22/23 22:53:29 97.7 °F (36.5 °C) 78 18 129/84 99 92 % None (Room air) Lying   08/22/23 19:29:22 -- -- -- -- -- 95 % -- --   08/22/23 1905 97.8 °F (36.6 °C) 82 16 133/82 -- -- None (Room air) Lying   08/22/23 1646 -- 82 16 128/81 -- 98 % None (Room air) Lying     08/22/23 1315 -- 93 -- -- -- 96 % -- --   08/22/23 1245 -- 91 22 -- -- 98 % -- --   08/22/23 1150 -- 87 -- 136/87 -- -- -- --   08/22/23 1115 -- 87 22 136/87 106 96 % -- --   08/22/23 1000 -- 87 20 150/94 117 97 % -- --   08/22/23 0900 -- 92 20 143/98 115 95 % -- --   08/22/23 0815 -- 91 20 142/85 107 97 % -- --   08/22/23 0700 -- 83 20 145/102 Abnormal  115 95 % -- --   08/22/23 0600 -- 81 18 136/91 109 96 % None (Room air) Lying   08/22/23 0530 -- 84 21 131/84 100 97 % None (Room air) Lying   08/22/23 0500 -- 84 16 190/92 Abnormal  128 100 % None (Room air) Lying   08/22/23 0400 -- 80 18 127/87 104 96 % None (Room air) Lying   08/22/23 0230 -- 84 20 132/86 101 97 % None (Room air) Lying   08/22/23 0000 -- 85 20 122/80 97 93 % None (Room air) Sitting   08/21/23 2200 -- 86 16 111/79 91 95 % None (Room air) Sitting   08/21/23 2100 -- 80 18 119/81 96 94 % None (Room air) Sitting   08/21/23 1930 -- 81 20 143/78 104 92 % None (Room air) Sitting   08/21/23 1830 -- 81 20 126/89 102 93 % -- --   08/21/23 1800 -- 79 37 Abnormal  135/104 Abnormal  116 94 % -- --   08/21/23 1730 -- 74 20 136/69 97 92 % -- --   08/21/23 1700 -- 72 32 Abnormal  156/80 108 93 % -- --   08/21/23 1630 -- 69 20 136/66 91 91 % -- --   08/21/23 1545 -- 69 22 124/79 97 98 % -- --     Pertinent Labs/Diagnostic Test Results:     8/21 ECG - Normal sinus rhythm   Nonspecific ST abnormality   Abnormal ECG  8/21 ECG - Normal sinus rhythm   Normal ECG    8/22 Echo - •  Left Ventricle: Left ventricular cavity size is normal. Wall thickness is moderately to severely increased. There is moderate to severe concentric hypertrophy. Systolic function is hyperdynamic (70%). Wall motion is normal. Diastolic function is mildly abnormal, consistent with grade I (abnormal) relaxation. •  Right Ventricle: Right ventricular cavity size is normal. Systolic function is normal.  •  Aortic Valve: There is trace regurgitation. There is mild stenosis. •  Mitral Valve: There is mild annular calcification. There is trace regurgitation. •  Tricuspid Valve: There is trace regurgitation. •  Pulmonic Valve: There is trace regurgitation. CT head wo contrast   Final Result by Gavin Garcia MD (08/21 1732)      No acute intracranial abnormality. CT chest abdomen pelvis wo contrast   Final Result by CK Simpson MD (08/21 0628)   No acute intrathoracic or intra-abdominal injury. Small amount of air in the suboptimally visualized bladder may be due to recent instrumentation. Correlate clinically. 3 mm left lower lobe nodule. Based on current Fleischner Society 2017 Guidelines on incidental pulmonary nodule, because the patient is considered high risk for lung cancer, 12 month follow-up non-contrast chest CT is recommended. XR chest pa & lateral      No acute cardiopulmonary disease.       MRI Inpatient Order (Results Pending)   No MR evidence of acute ischemia.   Stable periventricular signal abnormality could relate to demyelinating disease and/or chronic microangiopathic change. No enhancing lesions. MRA Head    Normal MRA Brain.      MRA Carotids   Unremarkable MR angiogram of the cervical vasculature.          Results from last 7 days   Lab Units 08/24/23  0451 08/23/23  0456 08/22/23  0414 08/21/23  1545   WBC Thousand/uL 7.07 6.65 9.83 11.34*   HEMOGLOBIN g/dL 11.1* 11.1* 11.8 13.4   HEMATOCRIT % 35.1 36.0 37.1 42.1   PLATELETS Thousands/uL 244 229 268 306   NEUTROS ABS Thousands/µL 4.00 3.71  --   --          Results from last 7 days   Lab Units 08/24/23  0451 08/23/23  0456 08/22/23 0414 08/21/23  1545   SODIUM mmol/L 138 140 140 140   POTASSIUM mmol/L 4.1 4.4 4.5 3.9   CHLORIDE mmol/L 109* 108 108 104   CO2 mmol/L 24 27 24 27   ANION GAP mmol/L 5 5 8 9   BUN mg/dL 20 22 22 14   CREATININE mg/dL 1.11 1.28 1.59* 1.84*   EGFR ml/min/1.73sq m 52 44 34 28   CALCIUM mg/dL 9.2 9.4 9.6 10.7*   MAGNESIUM mg/dL 2.3  --   --  1.9     Results from last 7 days   Lab Units 08/21/23  1545   AST U/L 21   ALT U/L 17   ALK PHOS U/L 136*   TOTAL PROTEIN g/dL 8.6*   ALBUMIN g/dL 4.9   TOTAL BILIRUBIN mg/dL 0.35   BILIRUBIN DIRECT mg/dL 0.06         Results from last 7 days   Lab Units 08/24/23  0451 08/23/23  0456 08/22/23  0414 08/21/23  1545   GLUCOSE RANDOM mg/dL 91 94 116 103         Results from last 7 days   Lab Units 08/22/23  0414   HEMOGLOBIN A1C % 6.5*   EAG mg/dl 140     Results from last 7 days   Lab Units 08/21/23 2003 08/21/23  1758 08/21/23  1545   HS TNI 0HR ng/L  --   --  12   HS TNI 2HR ng/L  --  14  --    HSTNI D2 ng/L  --  2  --    HS TNI 4HR ng/L 12  --   --    HSTNI D4 ng/L 0  --   --              Results from last 7 days   Lab Units 08/21/23  1612   TSH 3RD GENERATON uIU/mL 4.082     Results from last 7 days   Lab Units 08/21/23  1923   PROCALCITONIN ng/ml 0.08     Results from last 7 days   Lab Units 08/21/23  1703   LACTIC ACID mmol/L 1.4             Results from last 7 days   Lab Units 08/21/23  1612   BNP pg/mL 13     Results from last 7 days   Lab Units 08/21/23  1758   CLARITY UA  Turbid   COLOR UA  Light Yellow   SPEC GRAV UA  1.008   PH UA  6.5   GLUCOSE UA mg/dl Negative   KETONES UA mg/dl Negative   BLOOD UA  Negative   PROTEIN UA mg/dl Negative   NITRITE UA  Negative   BILIRUBIN UA  Negative   UROBILINOGEN UA (BE) mg/dl <2.0   LEUKOCYTES UA  Trace*   WBC UA /hpf 2-4*   RBC UA /hpf None Seen   BACTERIA UA /hpf Innumerable*   EPITHELIAL CELLS WET PREP /hpf Moderate*     Results from last 7 days   Lab Units 08/21/23  1924 08/21/23  1921   BLOOD CULTURE  No Growth at 48 hrs. No Growth at 48 hrs.          ED Treatment:   Medication Administration from 08/21/2023 1233 to 08/22/2023 1448       Date/Time Order Dose Route Action     08/21/2023 1608 EDT sodium chloride 0.9 % bolus 1,000 mL 1,000 mL Intravenous New Bag     08/21/2023 1609 EDT magnesium sulfate 2 g/50 mL IVPB (premix) 2 g 2 g Intravenous New Bag     08/21/2023 1605 EDT meclizine (ANTIVERT) tablet 25 mg 25 mg Oral Given     08/21/2023 1956 EDT cefTRIAXone (ROCEPHIN) IVPB (premix in dextrose) 1,000 mg 50 mL 1,000 mg Intravenous New Bag     08/22/2023 0800 EDT clopidogrel (PLAVIX) tablet 75 mg 75 mg Oral Given     08/22/2023 0800 EDT ferrous sulfate tablet 325 mg 325 mg Oral Given     05/69/0953 7228 EDT folic acid (FOLVITE) tablet 1 mg 1 mg Oral Given     08/22/2023 0800 EDT pantoprazole (PROTONIX) EC tablet 40 mg 40 mg Oral Given     08/21/2023 2311 EDT sodium chloride 0.9 % infusion 50 mL/hr Intravenous New Bag     08/22/2023 0557 EDT heparin (porcine) subcutaneous injection 5,000 Units 5,000 Units Subcutaneous Given     08/22/2023 0800 EDT gabapentin (NEURONTIN) capsule 100 mg 100 mg Oral Given     08/22/2023 1139 EDT gabapentin (NEURONTIN) capsule 800 mg 800 mg Oral Given     08/22/2023 1139 EDT magnesium sulfate 2 g/50 mL IVPB (premix) 2 g 2 g Intravenous New Bag     08/22/2023 1139 EDT metoclopramide (REGLAN) injection 10 mg 10 mg Intravenous Given        Past Medical History:   Diagnosis Date   • Chronic kidney disease    • Glaucoma    • Hypertension    • Knee arthropathy 2023    Left Knee   • Migraine    • Multiple sclerosis (HCC)    • Night muscle spasms    • Stroke ([address])     2 strokes in past, 2011 and 2013     Present on Admission:  • Multiple sclerosis ([address])  • Essential hypertension  • Stroke-like symptoms  • JOVANA (acute kidney injury) ([address])  • Intractable headache      Admitting Diagnosis: Emphysematous cystitis [N30.80]  Dizziness [R42]  Hypotension [I95.9]  Acute on chronic renal insufficiency [N28.9, N18.9]  Hypotensive episode [I95.9]  Stroke-like symptoms [R29.90]  Fall, initial encounter [W19. XXXA]  Double vision with both eyes open [H53.2]  Headache [R51.9]  Age/Sex: 58 y.o. female  Admission Orders:  Scheduled Medications:  atorvastatin, 40 mg, Oral, Daily With Dinner  cefTRIAXone, 1,000 mg, Intravenous, Q24H  clopidogrel, 75 mg, Oral, Daily  ferrous sulfate, 325 mg, Oral, Daily With Breakfast  folic acid, 1 mg, Oral, Daily  gabapentin, 800 mg, Oral, 4x Daily  heparin (porcine), 5,000 Units, Subcutaneous, Q8H PAPITO  hydrALAZINE, 75 mg, Oral, Q8H PAPITO  losartan, 50 mg, Oral, Daily  magnesium sulfate, 2 g, Intravenous, Once  melatonin, 3 mg, Oral, HS  metoclopramide, 10 mg, Intravenous, Once  nicotine, 1 patch, Transdermal, Daily  NIFEdipine, 120 mg, Oral, Daily  pantoprazole, 40 mg, Oral, Early Morning      Continuous IV Infusions:  sodium chloride, 50 mL/hr, Intravenous, Continuous      PRN Meds:  iohexol, 100 mL, Intravenous, Once in imaging    Urine culture  Tele  Monitor urinary retention  Neuro checks Every 1 hour x 4 hours, then every 2 hours x 4, then every 4 hours x 72 hours                 Daily wt  Fall prec  MRI Brain 8/22  IP CONSULT TO NEUROLOGY    Network Utilization Review Department  ATTENTION: Please call with any questions or concerns to [phone] and carefully listen to the prompts so that you are directed to the right person. All voicemails are confidential.  [place] all requests for admission clinical reviews, approved or denied determinations and any other requests to dedicated fax number below belonging to the campus where the patient is receiving treatment.  List of dedicated fax numbers for the Facilities:  [place] DENIALS (Administrative/Medical Necessity) [phone] [address] [facility] (Maternity/NICU/Pediatrics) [phone]   [address] [place] [address] [phone]   [facility] [address] [facility] [phone]   [address] [facility] [address] [facility] [address] [phone]   [address] [address] [phone]

## 2023-08-22 NOTE — CONSULTS
Consultation - Neurology   Leena Bauer 58 y.o. female MRN: [id number]  Unit/Bed#: ED 08 Encounter: [id number]      Assessment/Plan     * Stroke-like symptoms  Assessment & Plan  50-year-old female with prior stroke with residual left-sided weakness in 2016 on Plavix, MS on Aubagio, CAD, CKD, hypertension, migraines, tobacco use, history of cocaine use, who presents with strokelike symptoms. Patient initially presented after having a hypotensive episode (80/60) while at her outpatient nephrologist's office. On presentation, patient also reported having dizziness and double vision since the morning of 8/21 along with headache. She also had a fall due to the dizziness. BP on presentation 104/67. Unclear etiology at this time. It appears that patient has chronic double vision and dizziness but symptoms recently worsened. Will obtain MRI brain for further evaluation. Plan:  - Stroke pathway  • MRI brain w/wo contrast/MRA head and neck  • Echo  • Continue home regimen of Plavix 75 mg  • Atorvastatin 40 mg  • Goal normotension; avoid hypotension  • Continue telemetry  • PT/OT/ST  • Frequent neuro checks. Continue to monitor and notify neurology with any changes. - Labs pending: blood cultures, urine culture  - Headache management: give mag sulfate 2 g IV x 1, Reglan 10 mg x 1  - Medical management and supportive care per primary team. Correction of any metabolic or infectious disturbances.      Results:  - CT head: No acute intracranial abnormality.  - Labs on presentation: WBC 11.34, creatinine 1.84, alk phos 136, TSH 4.082, UA concerning for UTI, , A1C 6.5    UTI (urinary tract infection)  Assessment & Plan  - UA concerning for UTI  - Blood cultures and urine culture pending  - Currently on Rocephin  - Medical management per primary team    JOVANA (acute kidney injury) ([address])  Assessment & Plan  - Creatinine on presentation 1.84  - Medical management per primary team    Multiple sclerosis [facility])  Assessment & Plan  - On [address] with Dr. Phil Leo outpatient  - MRI brain w/wo contrast    Essential hypertension  Assessment & Plan  - Presented due to hypotension outpatient with BP 80/60  - Goal normotension; avoid hypotension  - Medical management per primary team      Neida Chavez will need follow up in at the next regular appointment with multiple sclerosis attending or advance practitioner. She will not require outpatient neurological testing. Case and treatment plan reviewed with attending neurologist, Dr. Iban Gray. Please see attending attestation for any further recommendations. History of Present Illness     Reason for Consult / Principal Problem: Stroke-like symptoms  HPI: Neida Chavez is a 58 y.o.  female with prior stroke with residual L sided weakness in 2016 on Plavix, MS on Aubagio, CAD, CKD, HTN, migraines, tobacco use, history of cocaine use, who presents with stroke-like symptoms. Patient initially presented after having a hypotensive episode while at her nephrologist's office outpatient. Patient also reported dizziness and double vision that started the morning of 8/21 along with headache. Patient reported a fall due to the dizziness. Patient was admitted at [facility] in 05/2023 for symptoms of chest pain, SOB, L arm numbness, and slurred speech. The L sided numbness resolved during that admission. Patient was evaluated by neurology at that time and completed MRI brain w/wo contrast which did not demonstrate new lesions or stroke. Patient was recommended to continue her home medications and follow up with her outpatient neurologist.    Patient was evaluated by neurology in 2016 for acute R thalamic stroke. Etiology was suspected to be small vessel due to long-standing HTN and cocaine use. There was also concern for MS on imaging as well.     She then was seen by Dr. Phil Leo outpatient in 10/2022 for complaints of blurred vision, speech difficulty, gait difficulty, and L sided weakness. Work up had been completed at [address] brain w/wo contrast demonstrated findings consistent with MS in the supratentorial and infratentorial regions without enhancement while MRI cervical spine was without lesions or enhancement. It noted LP was not obtained but patient had been placed on Aubagio. Patient seen and evaluated at the bedside with attending neurologist.  Patient states that she recently got new lenses for her glasses at the end of last month so she was having dizziness that she was adjusting to them but the dizziness would resolve when she closed her eyes. Now, the dizziness is present and closing her eyes does not resolve it. She states that she has had double vision and dizziness for a while now (since 2015) but her symptoms have worsened within the last month. She notes that the dizziness is constant for the last month but was intermittent prior to this. She denies hearing loss or ringing in her ears. She denies recent illnesses. She reports medication compliance with her Aubagio. She also has had a headache since yesterday, described as a jackhammer feeling in the front of her head. She does not normally have headaches. She denies any weakness. She usually ambulates with a cane. She spoke with her roommate on the phone this morning and he noted that she sounded like she was "drunk". She denies any prior speech difficulty. She does endorse chronic neck pain due to arthritis and is in therapy for it. Inpatient consult to Neurology  Consult performed by: MAIKOL Bernstein  Consult ordered by: Nick Dow PA-C          Review of Systems   Constitutional: Negative for fever. HENT: Negative for hearing loss and tinnitus. Eyes: Positive for visual disturbance. Respiratory: Negative for shortness of breath. Cardiovascular: Negative for palpitations. Gastrointestinal: Negative for nausea and vomiting. Genitourinary: Negative for dysuria.    Musculoskeletal: Positive for neck pain. Negative for gait problem. Neurological: Positive for dizziness, speech difficulty and headaches. Negative for weakness. Psychiatric/Behavioral: Negative for confusion. All other systems reviewed and are negative. Historical Information   Past Medical History:   Diagnosis Date   • Chronic kidney disease    • Glaucoma    • Hypertension    • Knee arthropathy     Left Knee   • Migraine    • Multiple sclerosis ([address])    • Night muscle spasms    • Stroke ([address])     2 strokes in past,  and      Past Surgical History:   Procedure Laterality Date   • ESOPHAGOGASTRODUODENOSCOPY N/A 2016    Procedure: ESOPHAGOGASTRODUODENOSCOPY (EGD); Surgeon: Joana Davenport MD;  Location:  GI LAB; Service:    • HIP ARTHROPLASTY Bilateral      Social History   Social History     Substance and Sexual Activity   Alcohol Use Yes     Social History     Substance and Sexual Activity   Drug Use No     E-Cigarette/Vaping   • E-Cigarette Use Never User      E-Cigarette/Vaping Substances   • Nicotine No    • THC No    • CBD No    • Flavoring No    • Other No    • Unknown No      Social History     Tobacco Use   Smoking Status Every Day   • Packs/day: 0.25   • Years: 10.00   • Total pack years: 2.50   • Types: Cigarettes   • Last attempt to quit: 2021   • Years since quittin.6   Smokeless Tobacco Never   Tobacco Comments    1 or 2 a week     Family History:   Family History   Problem Relation Age of Onset   • No Known Problems Mother    • No Known Problems Father        Review of previous medical records was completed.     Meds/Allergies   all current active meds have been reviewed, current meds:   Current Facility-Administered Medications   Medication Dose Route Frequency   • atorvastatin (LIPITOR) tablet 40 mg  40 mg Oral Daily With Dinner   • cefTRIAXone (ROCEPHIN) IVPB (premix in dextrose) 1,000 mg 50 mL  1,000 mg Intravenous Q24H   • clopidogrel (PLAVIX) tablet 75 mg  75 mg Oral Daily   • ferrous sulfate tablet 325 mg  325 mg Oral Daily With Breakfast   • folic acid (FOLVITE) tablet 1 mg  1 mg Oral Daily   • gabapentin (NEURONTIN) capsule 800 mg  800 mg Oral 4x Daily   • heparin (porcine) subcutaneous injection 5,000 Units  5,000 Units Subcutaneous Q8H [address]   • iohexol (OMNIPAQUE) 350 MG/ML injection (SINGLE-DOSE) 100 mL  100 mL Intravenous Once in imaging   • LORazepam (ATIVAN) injection 0.5 mg  0.5 mg Intravenous Once   • melatonin tablet 3 mg  3 mg Oral HS   • nicotine (NICODERM CQ) 21 mg/24 hr TD 24 hr patch 1 patch  1 patch Transdermal Daily   • pantoprazole (PROTONIX) EC tablet 40 mg  40 mg Oral Early Morning   • sodium chloride 0.9 % infusion  50 mL/hr Intravenous Continuous    and PTA meds:   Prior to Admission Medications   Prescriptions Last Dose Informant Patient Reported? Taking?    Aubagio 14 MG TABS  Self No No   Sig: Take 1 tablet (14 mg total) by mouth in the morning   Cholecalciferol 125 MCG (5000 UT) capsule  Self Yes No   Si,000 Units   NIFEdipine ER (ADALAT CC) 60 MG 24 hr tablet  Self Yes No   Sig: TAKE TWO TABLETS BY MOUTH EVERY DAY   atorvastatin (LIPITOR) 40 mg tablet  Self Yes No   clopidogrel (PLAVIX) 75 mg tablet  Self Yes No   Sig: Take 75 mg by mouth daily   cyclobenzaprine (FLEXERIL) 10 mg tablet  Self No No   Sig: Take 1 tablet (10 mg total) by mouth 3 (three) times a day as needed for muscle spasms   ferrous sulfate 325 (65 Fe) mg tablet  Self Yes No   Sig: Take 65 mg by mouth   folic acid (FOLVITE) 1 mg tablet  Self Yes No   gabapentin (NEURONTIN) 600 MG tablet  Self Yes No   Si mg 4 (four) times a day   hydrALAZINE (APRESOLINE) 50 mg tablet  Self Yes No   Sig: Take 50 mg by mouth Three times a day   losartan (COZAAR) 50 mg tablet  Self Yes No   Sig: Take one tablet by mouth once a day   melatonin 3 mg  Self Yes No   Sig: Take 3 mg by mouth   meloxicam (MOBIC) 15 mg tablet  Self Yes No   nitroglycerin (NITROSTAT) 0.4 mg SL tablet  Self Yes No   Sig: PRN omeprazole (PriLOSEC) 20 mg delayed release capsule  Self Yes No   Sig: omeprazole 20 mg capsule,delayed release      Facility-Administered Medications: None       Allergies   Allergen Reactions   • Acetaminophen Swelling   • Aspirin Swelling   • Lisinopril Swelling       Objective   Vitals:Blood pressure 136/87, pulse 93, temperature 98.1 °F (36.7 °C), temperature source Temporal, resp. rate 22, SpO2 96 %. ,There is no height or weight on file to calculate BMI. Intake/Output Summary (Last 24 hours) at 8/22/2023 1345  Last data filed at 8/21/2023 1658  Gross per 24 hour   Intake 50 ml   Output --   Net 50 ml       Invasive Devices: Invasive Devices     Peripheral Intravenous Line  Duration           Peripheral IV 08/21/23 Right Antecubital <1 day                  Physical and neurologic exam performed by attending neurologist:  Physical Exam  Vitals and nursing note reviewed. Constitutional:       General: She is not in acute distress. Appearance: Normal appearance. She is not ill-appearing. HENT:      Head: Normocephalic. Mouth/Throat:      Mouth: Mucous membranes are moist.      Pharynx: Oropharynx is clear. Eyes:      General: No scleral icterus. Right eye: No discharge. Left eye: No discharge. Conjunctiva/sclera: Conjunctivae normal.   Cardiovascular:      Rate and Rhythm: Normal rate. Pulmonary:      Effort: Pulmonary effort is normal. No respiratory distress. Musculoskeletal:         General: Normal range of motion. Cervical back: Normal range of motion. Tenderness present. Skin:     General: Skin is warm and dry. Coloration: Skin is not jaundiced or pale. Neurological:      Mental Status: She is alert and oriented to person, place, and time. Cranial Nerves: Cranial nerves 2-12 are intact.       Coordination: Finger-Nose-Finger Test normal.      Deep Tendon Reflexes:      Reflex Scores:       Bicep reflexes are 1+ on the right side and 1+ on the left side. Brachioradialis reflexes are 1+ on the right side and 1+ on the left side. Patellar reflexes are 1+ on the right side and 1+ on the left side. Achilles reflexes are 1+ on the right side and 1+ on the left side. Psychiatric:         Mood and Affect: Mood normal.         Behavior: Behavior normal.       Neurologic Exam     Mental Status   Oriented to person, place, and time. Level of consciousness: alert  Able to follow commands appropriately. Able to repeat phrases appropriately. Subtle slurring with speech. Cranial Nerves   Cranial nerves II through XII intact. Except for R exotropia and palsy with right eye adduction     Motor Exam   Muscle bulk: normal  Right arm pronator drift: absent  Left arm pronator drift: absent  Full strength throughout bilateral UE/LE except for:  L UE proximal 4-4+/5  L LE (limited due to pain) proximal 3/5     Sensory Exam   Decreased sensation to temperature and light touch in L UE/LE     Gait, Coordination, and Reflexes     Coordination   Finger to nose coordination: normal    Tremor   Resting tremor: absent  Intention tremor: absent    Reflexes   Right brachioradialis: 1+  Left brachioradialis: 1+  Right biceps: 1+  Left biceps: 1+  Right patellar: 1+  Left patellar: 1+  Right achilles: 1+  Left achilles: 1+      Lab Results:   I have personally reviewed pertinent reports.   , CBC:   Results from last 7 days   Lab Units 08/22/23  0414 08/21/23  1545   WBC Thousand/uL 9.83 11.34*   RBC Million/uL 3.98 4.54   HEMOGLOBIN g/dL 11.8 13.4   HEMATOCRIT % 37.1 42.1   MCV fL 93 93   PLATELETS Thousands/uL 268 306   , BMP/CMP:   Results from last 7 days   Lab Units 08/22/23  0414 08/21/23  1545   SODIUM mmol/L 140 140   POTASSIUM mmol/L 4.5 3.9   CHLORIDE mmol/L 108 104   CO2 mmol/L 24 27   BUN mg/dL 22 14   CREATININE mg/dL 1.59* 1.84*   CALCIUM mg/dL 9.6 10.7*   AST U/L  --  21   ALT U/L  --  17   ALK PHOS U/L  --  136*   EGFR ml/min/1.73sq m 34 28   , Vitamin B12:   , HgBA1C:   Results from last 7 days   Lab Units 08/22/23  0414   HEMOGLOBIN A1C % 6.5*   , TSH:   Results from last 7 days   Lab Units 08/21/23  1612   TSH 3RD GENERATON uIU/mL 4.082   , Coagulation:   , Lipid Profile:   Results from last 7 days   Lab Units 08/22/23  0414   HDL mg/dL 43*   LDL CALC mg/dL 117*   TRIGLYCERIDES mg/dL 178*   , Ammonia:   , Urinalysis:   Results from last 7 days   Lab Units 08/21/23  1758   COLOR UA  Light Yellow   CLARITY UA  Turbid   SPEC GRAV UA  1.008   PH UA  6.5   LEUKOCYTES UA  Trace*   NITRITE UA  Negative   GLUCOSE UA mg/dl Negative   KETONES UA mg/dl Negative   BILIRUBIN UA  Negative   BLOOD UA  Negative   , Drug Screen:   , Medication Drug Levels:       Invalid input(s): "CARBAMAZEPINE", "LACOSAMIDE", "OXCARBAZEPINE"     Imaging Studies: I have personally reviewed pertinent reports. EKG, Pathology, and Other Studies: I have personally reviewed pertinent reports.     VTE Prophylaxis: Sequential compression device (Venodyne)  and Heparin    Code Status: Level 1 - Full Code  Advance Directive and Living Will:      Power of :    POLST:

## 2023-08-22 NOTE — ASSESSMENT & PLAN NOTE
- Presented due to hypotension outpatient with BP 80/60  - Goal normotension; avoid hypotension  - Medical management per primary team

## 2023-08-22 NOTE — PHYSICAL THERAPY NOTE
Physical Therapy Evaluation     Patient's Name: Johnathan Rule    Admitting Diagnosis  Hypotension [I95.9]    Problem List  Patient Active Problem List   Diagnosis    Stroke [facility])    Essential hypertension    Abdominal pain    Nausea and/or vomiting    Syncope    Stroke-like symptoms    Bradycardia    Stage 3 chronic kidney disease (HCC)    Chronic kidney disease-mineral and bone disorder    Multiple sclerosis (HCC)    Lumbar radiculopathy    JOVANA (acute kidney injury) ([address])    Adrenal mass, right ([address])    Cardiomegaly    Hypercalcemia    Cervical spondylosis    Cervical radiculopathy    UTI (urinary tract infection)    Fall     Past Medical History  Past Medical History:   Diagnosis Date    Chronic kidney disease     Glaucoma     Hypertension     Knee arthropathy 2023    Left Knee    Migraine     Multiple sclerosis (HCC)     Night muscle spasms     Stroke ([address])     2 strokes in past, 2011 and 2013     Past Surgical History  Past Surgical History:   Procedure Laterality Date    ESOPHAGOGASTRODUODENOSCOPY N/A 8/9/2016    Procedure: ESOPHAGOGASTRODUODENOSCOPY (EGD); Surgeon: Chon Esparza MD;  Location: [facility]; Service:     HIP ARTHROPLASTY Bilateral       08/22/23 0744   PT Last Visit   PT Visit Date 08/22/23   Note Type   Note type Evaluation   Pain Assessment   Pain Assessment Tool 0-10   Pain Score 8   Pain Location/Orientation Location: Head   Pain Radiating Towards frontal   Pain Onset/Description Descriptor: Headache   Hospital Pain Intervention(s) Repositioned; Ambulation/increased activity   Restrictions/Precautions   Weight Bearing Precautions Per Order No   Braces or Orthoses Other (Comment)  (none per patient)   Other Precautions Chair Alarm; Bed Alarm;Multiple lines;Telemetry;Pain; Fall Risk   Home Living   Type of Home Mobile home   Home Layout One level; Able to live on main level with bedroom/bathroom; Performs ADLs on one level;Stairs to enter with rails  (4 DOLORES)   Bathroom Shower/Tub Tub/shower unit Bathroom Toilet Raised   Bathroom Equipment Other (Comment)  (none per patient)   [address] Cane;Walker   Additional Comments Pt ambulates with a cane. Prior Function   Level of [place] Independent with functional mobility; Independent with ADLs; Independent with IADLS   Lives With Other (Comment)  (roommate)   Receives Help From Friend(s); Outpatient therapy  (roommate; PT 2x/week)   IADLs Independent with driving; Independent with meal prep; Independent with medication management   Falls in the last 6 months 1 to 4  (1 fall PTA)   Vocational On disability   General   Family/Caregiver Present No   Cognition   Overall Cognitive Status WFL   Arousal/Participation Alert   Orientation Level Oriented X4   Memory Within functional limits   Following Commands Follows all commands and directions without difficulty   Comments Pt agreeable to PT. Subjective   Subjective "I haven't been out of bed since I came in."   RLE Assessment   RLE Assessment X   Strength RLE   RLE Overall Strength 4-/5   LLE Assessment   LLE Assessment X   Strength LLE   LLE Overall Strength 3/5   Light Touch   RLE Light Touch Grossly intact   LLE Light Touch Impaired   LLE Light Touch Comments diminshed compared to R LE   Bed Mobility   Supine to Sit 4  Minimal assistance   Additional items Assist x 1;HOB elevated; Increased time required;Verbal cues;LE management   Sit to Supine 4  Minimal assistance   Additional items Assist x 1;HOB elevated; Increased time required;Verbal cues;LE management   Transfers   Sit to Stand 4  Minimal assistance   Additional items Assist x 2; Increased time required;Verbal cues   Stand to Sit 4  Minimal assistance   Additional items Assist x 2; Increased time required;Verbal cues   Ambulation/Elevation   Gait pattern Decreased toe off;Decreased heel strike;Decreased hip extension; Excessively slow; Step to;Short stride; Foward flexed;Decreased L stance;Decreased foot clearance; Improper Weight shift  (decreased left foot clearance)   Gait Assistance 3  Moderate assist   Additional items Assist x 2;Verbal cues   Assistive Device Rolling walker;Straight cane  (close chair follow)   Distance 15 feet x 2 trials; seated rest break between each trial and 1-2 standing rest breaks during each trial  (pt ambulated 15 feet x 1 trial with use of cane; pt ambulated 15 feet x 1 trial with use of RW; recommend use of RW for future ambulation trials)   Ambulation/Elevation Additional Comments Pt with intermittent complaints of dizziness when ambulating; resolved with standing rest break   Balance   Static Sitting Fair +   Dynamic Sitting Fair   Static Standing Poor   Dynamic Standing Poor -   Ambulatory Poor -   Endurance Deficit   Endurance Deficit Yes   Endurance Deficit Description decreased activity tolerance   Activity Tolerance   Activity Tolerance Patient limited by fatigue   Medical Staff Made Aware OT Winsome Carpenter  (Co-evaluation performed with OT secondary to complex medical condition of patient and regression of functional status from baseline. PT/OT goals were addressed separately.)   Nurse Made Aware RN made aware of session outcomes   Assessment   Prognosis Good   Problem List Decreased strength;Decreased endurance; Impaired balance;Decreased mobility; Impaired sensation;Pain   Assessment Pt is 58year old female seen for PT evaluation s/p admit to [id number] [place] [address] on 8/21/2023 with Stroke-like symptoms. PT consulted to assess pt's functional mobility and discharge needs. Order placed for PT evaluation and treatment. Comorbidities affecting pt's physical performance at time of assessment include essential hypertension, multiple sclerosis, JOVANA, UTI, and fall. Prior to hospitalization, pt was independent with all functional mobility with a cane. Pt ambulates unrestricted distances on all terrain and elevations. Pt resides with her roommate, in a one level house, with 4 steps to enter. Personal factors affecting pt at time of initial evaluation include stairs to enter home, ambulating with an assistive device, inability to ambulate community distances, inability to navigate level surfaces without external assistance, unable to perform dynamic tasks in the community, limited home support, inability to live alone, positive fall history, difficulty performing ADLs, and inability to perform IADLs. Please find objective findings from PT assessment regarding body systems outlined above with impairments and limitations including weakness, impaired balance, decreased endurance, gait deviations, pain, decreased activity tolerance, decreased functional mobility tolerance, altered sensation, and fall risk. The following objective measures were performed on initial evaluation Barthel Index: 45/100, Modified Sis: 4 (moderate/severe disability) and AM-PAC 6-Clicks: 57/20. Pt's clinical presentation is currently unstable/unpredictable seen in pt's presentation of need for ongoing medical management/monitoring, pt is a fall risk, pt requires use of RW for safe ambulation, and pt requires cues and assist for safety with functional mobility. Pt to benefit from continued PT treatment to address deficits as defined above and maximize pt's level of function and independence with mobility. From a PT standpoint, recommendation at time of discharge would be STR pending pt's progress in order to facilitate return to prior level of function.    Barriers to Discharge Inaccessible home environment;Decreased caregiver support   Goals   STG Expiration Date 09/01/23   Short Term Goal #1 In 10 days: Increase bilateral LE strength 1/2 grade to facilitate independent mobility, Perform all bed mobility tasks modified independent to decrease caregiver burden, Perform all transfers modified independent to improve independence, Ambulate > 150 ft. with least restrictive assistive device modified independent w/o LOB and w/ normalized gait pattern 100% of the time, Navigate 4 stairs modified independent with unilateral handrail to facilitate return to previous living environment and Increase all balance 1/2 grade to decrease risk for falls   Plan   Treatment/Interventions Functional transfer training;LE strengthening/ROM; Elevations; Therapeutic exercise; Endurance training;Patient/family training;Bed mobility;Gait training;Spoke to nursing;OT   PT Frequency 3-5x/wk   Recommendation   PT Discharge Recommendation Post acute rehabilitation services   AM-PAC Basic Mobility Inpatient   Turning in Flat Bed Without Bedrails 3   Lying on Back to Sitting on Edge of Flat Bed Without Bedrails 3   Moving Bed to Chair 1   Standing Up From Chair Using Arms 2   Walk in Room 1   Climb 3-5 Stairs With Railing 1   Basic Mobility Inpatient Raw Score 11   Basic Mobility Standardized Score 30.25   Highest Level Of Mobility   -HLM Goal 4: Move to chair/commode   JH-HLM Achieved 6: Walk 10 steps or more   Modified Sis Scale   Modified [place] Scale 4   Barthel Index   Feeding 10   Bathing 0   Grooming Score 0   Dressing Score 5   Bladder Score 10   Bowels Score 10   Toilet Use Score 5   Transfers (Bed/Chair) Score 5   Mobility (Level Surface) Score 0   Stairs Score 0   Barthel Index Score 45     PT Evaluation Time: 3604-2027    Prachi Raymundo, PT, DPT

## 2023-08-22 NOTE — ASSESSMENT & PLAN NOTE
78-year-old female with prior stroke with residual left-sided weakness in 2016 on Plavix, MS on [place]ag[place], CAD, CKD, hypertension, migraines, tobacco use, history of cocaine use, who presents with strokelike symptoms. Patient initially presented after having a hypotensive episode (80/60) while at her outpatient nephrologist's office. On presentation, patient also reported having dizziness and double vision since the morning of 8/21 along with headache. She also had a fall due to the dizziness. BP on presentation 104/67. It appears that patient has chronic double vision and dizziness but symptoms recently worsened. Unclear etiology at this time- possibly worsening of chronic symptoms in the setting of UTI. MRI brain/MRA head and neck unremarkable for acute changes. Plan:  - Stroke pathway  • Continue home regimen of Plavix 75 mg  • Atorvastatin 40 mg  • Goal normotension; avoid hypotension  • Continue telemetry  • PT/OT/ST  • Frequent neuro checks. Continue to monitor and notify neurology with any changes. - Labs pending: blood cultures, urine culture  - Medical management and supportive care per primary team. Correction of any metabolic or infectious disturbances.   - No further inpatient neurology recommendations at this time. Please call with any questions. Results:  - CT head: No acute intracranial abnormality.  - MRI brain w/wo contrast:  1. No MR evidence of acute ischemia. 2. Stable periventricular signal abnormality could relate to demyelinating disease and/or chronic microangiopathic change. No enhancing lesions.  - MRA head and neck:  1. Normal MRA Brain. 2. Unremarkable MR angiogram of the cervical vasculature. - Echo: EF 70%. No regional wall motion abnormality noted.  Bilateral atrium size normal.  - Labs on presentation: WBC 11.34, creatinine 1.84, alk phos 136, TSH 4.082, UA concerning for UTI, , A1C 6.5

## 2023-08-22 NOTE — OCCUPATIONAL THERAPY NOTE
Occupational Therapy Evaluation        Patient Name: Nena Crum  BGBVX'A Date: 8/22/2023 08/22/23 0719   OT Last Visit   OT Visit Date 08/22/23   Note Type   Note type Evaluation   Pain Assessment   Pain Assessment Tool 0-10   Pain Score 8   Pain Location/Orientation Location: Head   Pain Radiating Towards front of head   Pain Onset/Description Descriptor: Headache   Hospital Pain Intervention(s) Repositioned; Ambulation/increased activity   Multiple Pain Sites Yes   Pain 2   Pain Score 2   ("there is an elephant on my chest")   Restrictions/Precautions   Weight Bearing Precautions Per Order No   Braces or Orthoses Other (Comment)  (none per patient)   Other Precautions Chair Alarm; Bed Alarm;Multiple lines;Telemetry;Pain; Fall Risk   Home Living   Type of [facility] One level;Stairs to enter with rails  (4 DOLORES)   Bathroom Shower/Tub Tub/shower unit   [employer] Raised   Bathroom Equipment Other (Comment)  (none at baseline)   [address]   Additional Comments ambulatory with Cane   Prior Function   Level of [place] Independent with functional mobility; Independent with ADLs; Independent with IADLS   Lives With Other (Comment)  (roommate)   Receives Help From Friend(s); Outpatient therapy  (roommate; PT 2x/week)   IADLs Independent with driving; Independent with meal prep; Independent with medication management   Falls in the last 6 months 1 to 4  (1 fall PTA)   Vocational On disability   Comments OP PT 2x week for neck pain   Lifestyle   Autonomy Patient reports independent  with ADLs/ IADLs. Patient ambulates with a cane, lives in a mobile home/ 1 [address]ES/ with a roommate.    Reciprocal Relationships Supportive friend   Service to Others on disability   General   Family/Caregiver Present No   ADL   Eating Assistance 6  Modified independent   Grooming Assistance 5  Supervision/Setup   UB Bathing Assistance 4  Minimal Assistance   LB Bathing Assistance 2  Maximal Assistance   UB Dressing Assistance 4  Minimal Assistance   LB Dressing Assistance 2  Maximal Assistance   Toileting Assistance  3  Moderate Assistance   Functional Assistance 3  Moderate Assistance   Bed Mobility   Supine to Sit 4  Minimal assistance   Additional items Assist x 1;HOB elevated; Increased time required;Verbal cues;LE management   Sit to Supine 4  Minimal assistance   Additional items Assist x 1;HOB elevated; Increased time required;Verbal cues;LE management   Transfers   Sit to Stand 4  Minimal assistance   Additional items Assist x 2; Increased time required;Verbal cues   Stand to Sit 4  Minimal assistance   Additional items Assist x 2; Increased time required;Verbal cues   Balance   Static Sitting Fair +   Dynamic Sitting Fair   Static Standing Poor   Dynamic Standing Poor -   Activity Tolerance   Activity Tolerance Patient limited by fatigue   Medical Staff Made Aware Co-evaluation performed with PT secondary to complex medical condition of patient and regression of functional status from baseline. Nurse Made Aware RN made aware of session outcomes   RUE Assessment   RUE Assessment WFL   LUE Assessment   LUE Assessment WFL   Hand Function   Gross Motor Coordination Impaired   Fine Motor Coordination Functional   Sensation   Light Touch No apparent deficits  (BUEs)   Proprioception   Proprioception No apparent deficits   Vision-Basic Assessment   Current Vision No visual deficits   Psychosocial   Psychosocial (WDL) WDL   Cognition   Overall Cognitive Status WFL   Arousal/Participation Alert; Responsive; Cooperative;Persistent stimuli required   Attention Within functional limits   Orientation Level Oriented X4   Memory Within functional limits   Following Commands Follows all commands and directions without difficulty   Assessment   Limitation Decreased ADL status; Decreased Safe judgement during ADL;Decreased UE ROM; Decreased endurance;Decreased self-care trans;Decreased high-level ADLs   Prognosis Good   Assessment Patient is a 58 y.o. female seen for OT evaluation s/p admit to [facility] on 8/21/2023 w/Stroke-like symptoms. Commorbidities affecting patient's functional performance at time of assessment include: essential hypertension, multiple sclerosis, JOVANA, UTI, and fall. Orders placed for OT evaluation and treatment. Performed at least two patient identifiers during session including name and wristband. Prior to admission, Patient reports independent  with ADLs/ IADLs. Patient ambulates with a cane, lives in a mobile home/ 1 [address]/ with a roommate. Personal factors affecting patient at time of initial evaluation include: limited caregiver support, steps to enter, limited insight into deficits, difficulty performing ADLs and difficulty performing IADLs. Upon evaluation, patient requires minimal  and moderate assist for UB ADLs, maximal assist for LB ADLs, transfers and functional ambulation in room and bathroom with moderate assist x 2, with the use of Rolling Walker. Occupational performance is affected by the following deficits: decreased functional use of BUEs, degenerative arthritic joint changes, dynamic sit/ stand balance deficit with poor standing tolerance time for self care and functional mobility, decreased activity tolerance, decreased safety awareness and postural control and postural alignment deficit, requiring external assistance to complete transitional movements. Patient to benefit from continued Occupational Therapy treatment while in the hospital to address deficits as defined above and maximize level of functional independence with ADLs and functional mobility. Occupational Performance areas to address include: bathing/ shower, dressing, toilet hygiene, transfer to all surfaces, functional mobility, emergency response, health maintenance, IADLs: safety procedures, IADLs: meal prep/ clean up and Leisure Participation.  From OT standpoint, recommendation at time of d/c would be Short Term Rehab. Plan   Treatment Interventions ADL retraining;Functional transfer training;UE strengthening/ROM; Endurance training;Patient/family training;Equipment evaluation/education; Compensatory technique education;Continued evaluation; Energy conservation; Activityengagement   Goal Expiration Date 09/05/23   OT Frequency 3-5x/wk   Recommendation   OT Discharge Recommendation Post acute rehabilitation services   AM-PAC Daily Activity Inpatient   Lower Body Dressing 2   Bathing 2   Toileting 3   Upper Body Dressing 3   Grooming 3   Eating 4   Daily Activity Raw Score 17   Daily Activity Standardized Score (Calc for Raw Score >=11) 37.26   AM-PAC Applied Cognition Inpatient   Following a Speech/Presentation 4   Understanding Ordinary Conversation 4   Taking Medications 4   Remembering Where Things Are Placed or Put Away 4   Remembering List of 4-5 Errands 4   Taking Care of Complicated Tasks 4   Applied Cognition Raw Score 24   Applied Cognition Standardized Score 62.21   Barthel Index   Feeding 10   Bathing 0   Grooming Score 0   Dressing Score 5   Bladder Score 10   Bowels Score 10   Toilet Use Score 5   Transfers (Bed/Chair) Score 5   Mobility (Level Surface) Score 0   Stairs Score 0   Barthel Index Score 45         1 - Patient will verbalize and demonstrate use of energy conservation/ deep breathing technique and work simplification skills during functional activity with no verbal cues. 2 - Patient will verbalize and demonstrate good body mechanics and joint protection techniques during  ADLs/ IADLs with no verbal cues. 3 - Patient will increase OOB/ sitting tolerance to 2-4 hours per day for increased participation in self care and leisure tasks with no s/s of exertion. 4 - Patient will increase standing tolerance time to 5  minutes with unilateral UE support to complete sink level ADLs@ mod I level.     5 - Patient will increase sitting tolerance at edge of bed to 20 minutes to complete UB ADLs @ set up assist level. 6 - Patient will transfer bed to Chair / toilet at Set up assist level with AD as indicated. 7 - Patient will complete UB ADLs with set up assist.     8 - Patient will complete LB ADLs with min assist with the use of adaptive equipment.      9 - Patient will complete toileting hygiene with set up assist/ supervision for thoroughness    10 - Patient/ Family  will demonstrate competency with UE Home Exercise Program.

## 2023-08-22 NOTE — PROGRESS NOTES
[address]  Progress Note  Name: Eliane Tomlinson  MRN: [id number]  Unit/Bed#: ED 08 I Date of Admission: 8/21/2023   Date of Service: 8/22/2023 I Hospital Day: 0    Assessment/Plan   * Stroke-like symptoms  Assessment & Plan  · Presented with dizziness and double vision  · CT head wo contrast: no acute abnormality  · On stroke pathway: MRI, ECHO, PT OT , neurology, speech  · Allergy to ASA noted, cont statin and plavix  · Tele monitoring  · Neurology consult    [address]  · Reports fall today due to dizziness  · Fall precautions  · PT/OT evaluation  · TSh normal  · Check B12    UTI (urinary tract infection)  Assessment & Plan  · Symptoms : dizziness, no dysuria or frequency or bladder or cva pain. · UA with WBC and innumerable bacteria, though nitrite negative  · Does not meet sepsis criteria  · Cont IV ceftriaxone until urine cultures  · Follow up urine culture    JOVANA (acute kidney injury) ([address])  Assessment & Plan  · Improving  · Creatinine elevated 1.84 on admission (baseline around 1.5 per care everywhere)  · Continue IV fluids  · Monitor creatinine    Multiple sclerosis (HCC)  Assessment & Plan  · Continue teriflunomide  · Will bring from home if needed  · Outpatient Neurology follow up    Essential hypertension  Assessment & Plan  · BP (!) 145/102   Pulse 83   Temp 98.1 °F (36.7 °C) (Temporal)   Resp 20   SpO2 95%   · Elevated pressures  · Cont losartan, nifedipine, hydralazine               VTE Pharmacologic Prophylaxis: VTE Score: 11 High Risk (Score >/= 5) - Pharmacological DVT Prophylaxis Ordered: heparin. Sequential Compression Devices Ordered. Patient Centered Rounds: I performed bedside rounds with nursing staff today. Discussions with Specialists or Other Care Team Provider: Yes, neurology    Education and Discussions with Family / Patient: Discussed with the patient. .     Total Time Spent on Date of Encounter in care of patient: 25 minutes This time was spent on one or more of the following: performing physical exam; counseling and coordination of care; obtaining or reviewing history; documenting in the medical record; reviewing/ordering tests, medications or procedures; communicating with other healthcare professionals and discussing with patient's family/caregivers. Current Length of Stay: 0 day(s)  Current Patient Status: Observation   Certification Statement: The patient will continue to require additional inpatient hospital stay due to MRI, stroke rule out  Discharge Plan: Anticipate discharge in 24-48 hrs to rehab facility. Code Status: Level 1 - Full Code    Subjective:   Seen and examined at bedside  Complains of headaches  Dizziness has improved    Objective:     Vitals:   Temp (24hrs), Av.1 °F (36.7 °C), Min:98.1 °F (36.7 °C), Max:98.1 °F (36.7 °C)    Temp:  [98.1 °F (36.7 °C)] 98.1 °F (36.7 °C)  HR:  [69-92] 87  Resp:  [16-37] 22  BP: (104-190)/() 136/87  SpO2:  [91 %-100 %] 96 %  There is no height or weight on file to calculate BMI. Input and Output Summary (last 24 hours):      Intake/Output Summary (Last 24 hours) at 2023 1156  Last data filed at 2023 1658  Gross per 24 hour   Intake 50 ml   Output --   Net 50 ml       Physical Exam:   Physical Exam  S1 S2 audible, regular  Lungs clear to auscultation bilaterally,   dizziness has improved  Abdomen nontender nondistended bowel sounds audible all 4 quadrants  No new focal neurodeficit      Additional Data:     Labs:  Results from last 7 days   Lab Units 23  0414 23  1545   WBC Thousand/uL 9.83 11.34*   HEMOGLOBIN g/dL 11.8 13.4   HEMATOCRIT % 37.1 42.1   PLATELETS Thousands/uL 268 306   LYMPHO PCT %  --  26   MONO PCT %  --  4   EOS PCT %  --  5     Results from last 7 days   Lab Units 23  0414 23  1545   SODIUM mmol/L 140 140   POTASSIUM mmol/L 4.5 3.9   CHLORIDE mmol/L 108 104   CO2 mmol/L 24 27   BUN mg/dL 22 14   CREATININE mg/dL 1.59* 1.84* ANION GAP mmol/L 8 9   CALCIUM mg/dL 9.6 10.7*   ALBUMIN g/dL  --  4.9   TOTAL BILIRUBIN mg/dL  --  0.35   ALK PHOS U/L  --  136*   ALT U/L  --  17   AST U/L  --  21   GLUCOSE RANDOM mg/dL 116 103             Results from last 7 days   Lab Units 08/22/23  0414   HEMOGLOBIN A1C % 6.5*     Results from last 7 days   Lab Units 08/21/23  1923 08/21/23  1703   LACTIC ACID mmol/L  --  1.4   PROCALCITONIN ng/ml 0.08  --        Lines/Drains:  Invasive Devices     Peripheral Intravenous Line  Duration           Peripheral IV 08/21/23 Right Antecubital <1 day                  Telemetry:  Telemetry Orders (From admission, onward)             24 Hour Telemetry Monitoring  Continuous x 24 Hours (Telem)        Question:  Reason for 24 Hour Telemetry  Answer:  TIA/Suspected CVA/ Confirmed CVA                 Telemetry Reviewed: Normal Sinus Rhythm  Indication for Continued Telemetry Use: Acute CVA             Imaging: Reviewed radiology reports from this admission including: CT head    Recent Cultures (last 7 days):   Results from last 7 days   Lab Units 08/21/23  1924 08/21/23  1921   BLOOD CULTURE  Received in Microbiology Lab. Culture in Progress. Received in Microbiology Lab. Culture in Progress.        Last 24 Hours Medication List:   Current Facility-Administered Medications   Medication Dose Route Frequency Provider Last Rate   • atorvastatin  40 mg Oral Daily With [employer], PA-C     • cefTRIAXone  1,000 mg Intravenous Q24H Kaiden Best PA-C     • clopidogrel  75 mg Oral Daily Emilia Howard PA-C     • ferrous sulfate  325 mg Oral Daily With Breakfast Emilia Howard PA-C     • folic acid  1 mg Oral Daily Emilia Howard PA-C     • gabapentin  800 mg Oral 4x Daily An K Long, CRNP     • heparin (porcine)  5,000 Units Subcutaneous Q8H [address] Emilia Howard PA-C     • iohexol  100 mL Intravenous Once in imaging Kaiden Best PA-C     • magnesium sulfate  2 g Intravenous Once An K Long, CRNP 2 g (08/22/23 1139)   • melatonin  3 mg Oral HS Emilia Howard PA-C     • nicotine  1 patch Transdermal Daily Emilia Howard PA-C     • pantoprazole  40 mg Oral Early Morning Emilia Howard PA-C     • sodium chloride  50 mL/hr Intravenous Continuous Steven Peña PA-C 50 mL/hr (08/21/23 4870)        Today, Patient Was Seen By: Yamilka Medeiros MD    **Please Note: This note may have been constructed using a voice recognition system. **

## 2023-08-22 NOTE — ASSESSMENT & PLAN NOTE
· Improving  · Creatinine elevated 1.84 on admission (baseline around 1.5 per care everywhere)  · Continue IV fluids  · Monitor creatinine

## 2023-08-22 NOTE — ASSESSMENT & PLAN NOTE
· Symptoms : dizziness, no dysuria or frequency or bladder or cva pain.    · UA with WBC and innumerable bacteria, though nitrite negative  · Does not meet sepsis criteria  · Cont IV ceftriaxone until urine cultures  · Follow up urine culture

## 2023-08-22 NOTE — CASE MANAGEMENT
Case Management Progress Note    Patient name Karime Lane  Location ED 08/ED 08 MRN [id number]  : 1960 Date 2023       LOS (days): 0  Geometric Mean LOS (GMLOS) (days):   Days to GMLOS:        OBJECTIVE:        Current admission status: Observation  Preferred Pharmacy:   [facility] # [address] [facility], [address] [facility]  [address] [place] [address]  Phone: [phone] Fax: [address] [place] - [address] [address]  Phone: [phone] Fax: [phone]    Primary Care Provider: Mona Agrawal MD    Primary Insurance: [facility]  Secondary Insurance:     PROGRESS NOTE:    Met with patient at bedside in ED to discuss therapy recs for rehab. Patient declines rehab. Patient wants to return home and continue her OP therapy at [facility].  Updated provided to Dr Caleb Trinh

## 2023-08-22 NOTE — ASSESSMENT & PLAN NOTE
· Reports fall today due to dizziness  · Fall precautions  · PT/OT evaluation  · TSh normal  · Check B12

## 2023-08-22 NOTE — ASSESSMENT & PLAN NOTE
· Reported to be hypotensive in Nephrology office today with SBP 70s  · Normotensive since arrival to the ED, /78  · Hold home anti-hypertensive regimen per stroke protocol  · Monitor vitals per routine

## 2023-08-23 ENCOUNTER — APPOINTMENT (INPATIENT)
Dept: MRI IMAGING | Facility: HOSPITAL | Age: 63
DRG: 054 | End: 2023-08-23
Payer: COMMERCIAL

## 2023-08-23 PROBLEM — R51.9 INTRACTABLE HEADACHE: Status: ACTIVE | Noted: 2023-08-23

## 2023-08-23 LAB
ANION GAP SERPL CALCULATED.3IONS-SCNC: 5 MMOL/L
BASOPHILS # BLD AUTO: 0.03 THOUSANDS/ÂΜL (ref 0–0.1)
BASOPHILS NFR BLD AUTO: 1 % (ref 0–1)
BUN SERPL-MCNC: 22 MG/DL (ref 5–25)
CALCIUM SERPL-MCNC: 9.4 MG/DL (ref 8.4–10.2)
CHLORIDE SERPL-SCNC: 108 MMOL/L (ref 96–108)
CO2 SERPL-SCNC: 27 MMOL/L (ref 21–32)
CREAT SERPL-MCNC: 1.28 MG/DL (ref 0.6–1.3)
EOSINOPHIL # BLD AUTO: 0.58 THOUSAND/ÂΜL (ref 0–0.61)
EOSINOPHIL NFR BLD AUTO: 9 % (ref 0–6)
ERYTHROCYTE [DISTWIDTH] IN BLOOD BY AUTOMATED COUNT: 14.6 % (ref 11.6–15.1)
GFR SERPL CREATININE-BSD FRML MDRD: 44 ML/MIN/1.73SQ M
GLUCOSE P FAST SERPL-MCNC: 94 MG/DL (ref 65–99)
GLUCOSE SERPL-MCNC: 94 MG/DL (ref 65–140)
HCT VFR BLD AUTO: 36 % (ref 34.8–46.1)
HGB BLD-MCNC: 11.1 G/DL (ref 11.5–15.4)
IMM GRANULOCYTES # BLD AUTO: 0.02 THOUSAND/UL (ref 0–0.2)
IMM GRANULOCYTES NFR BLD AUTO: 0 % (ref 0–2)
LYMPHOCYTES # BLD AUTO: 1.74 THOUSANDS/ÂΜL (ref 0.6–4.47)
LYMPHOCYTES NFR BLD AUTO: 26 % (ref 14–44)
MCH RBC QN AUTO: 28.8 PG (ref 26.8–34.3)
MCHC RBC AUTO-ENTMCNC: 30.8 G/DL (ref 31.4–37.4)
MCV RBC AUTO: 93 FL (ref 82–98)
MONOCYTES # BLD AUTO: 0.57 THOUSAND/ÂΜL (ref 0.17–1.22)
MONOCYTES NFR BLD AUTO: 9 % (ref 4–12)
NEUTROPHILS # BLD AUTO: 3.71 THOUSANDS/ÂΜL (ref 1.85–7.62)
NEUTS SEG NFR BLD AUTO: 55 % (ref 43–75)
NRBC BLD AUTO-RTO: 0 /100 WBCS
PLATELET # BLD AUTO: 229 THOUSANDS/UL (ref 149–390)
PMV BLD AUTO: 11.1 FL (ref 8.9–12.7)
POTASSIUM SERPL-SCNC: 4.4 MMOL/L (ref 3.5–5.3)
RBC # BLD AUTO: 3.86 MILLION/UL (ref 3.81–5.12)
SODIUM SERPL-SCNC: 140 MMOL/L (ref 135–147)
VIT B12 SERPL-MCNC: 386 PG/ML (ref 180–914)
WBC # BLD AUTO: 6.65 THOUSAND/UL (ref 4.31–10.16)

## 2023-08-23 PROCEDURE — 85025 COMPLETE CBC W/AUTO DIFF WBC: CPT | Performed by: FAMILY MEDICINE

## 2023-08-23 PROCEDURE — 70553 MRI BRAIN STEM W/O & W/DYE: CPT

## 2023-08-23 PROCEDURE — 99233 SBSQ HOSP IP/OBS HIGH 50: CPT | Performed by: FAMILY MEDICINE

## 2023-08-23 PROCEDURE — 80048 BASIC METABOLIC PNL TOTAL CA: CPT | Performed by: FAMILY MEDICINE

## 2023-08-23 PROCEDURE — A9585 GADOBUTROL INJECTION: HCPCS | Performed by: INTERNAL MEDICINE

## 2023-08-23 PROCEDURE — 70544 MR ANGIOGRAPHY HEAD W/O DYE: CPT

## 2023-08-23 PROCEDURE — 97116 GAIT TRAINING THERAPY: CPT

## 2023-08-23 PROCEDURE — 70549 MR ANGIOGRAPH NECK W/O&W/DYE: CPT

## 2023-08-23 PROCEDURE — 97110 THERAPEUTIC EXERCISES: CPT

## 2023-08-23 PROCEDURE — 82607 VITAMIN B-12: CPT | Performed by: FAMILY MEDICINE

## 2023-08-23 RX ORDER — NIFEDIPINE 30 MG/1
120 TABLET, EXTENDED RELEASE ORAL DAILY
Status: DISCONTINUED | OUTPATIENT
Start: 2023-08-24 | End: 2023-08-24 | Stop reason: HOSPADM

## 2023-08-23 RX ORDER — LOSARTAN POTASSIUM 50 MG/1
50 TABLET ORAL DAILY
Status: DISCONTINUED | OUTPATIENT
Start: 2023-08-24 | End: 2023-08-24 | Stop reason: HOSPADM

## 2023-08-23 RX ORDER — LORAZEPAM 2 MG/ML
0.5 INJECTION INTRAMUSCULAR ONCE
Status: DISCONTINUED | OUTPATIENT
Start: 2023-08-23 | End: 2023-08-23

## 2023-08-23 RX ORDER — LORAZEPAM 2 MG/ML
0.5 INJECTION INTRAMUSCULAR ONCE
Status: COMPLETED | OUTPATIENT
Start: 2023-08-23 | End: 2023-08-23

## 2023-08-23 RX ORDER — MAGNESIUM SULFATE HEPTAHYDRATE 40 MG/ML
2 INJECTION, SOLUTION INTRAVENOUS ONCE
Status: COMPLETED | OUTPATIENT
Start: 2023-08-23 | End: 2023-08-23

## 2023-08-23 RX ORDER — METOCLOPRAMIDE HYDROCHLORIDE 5 MG/ML
10 INJECTION INTRAMUSCULAR; INTRAVENOUS ONCE
Status: COMPLETED | OUTPATIENT
Start: 2023-08-23 | End: 2023-08-23

## 2023-08-23 RX ORDER — HYDRALAZINE HYDROCHLORIDE 25 MG/1
50 TABLET, FILM COATED ORAL EVERY 8 HOURS SCHEDULED
Status: DISCONTINUED | OUTPATIENT
Start: 2023-08-23 | End: 2023-08-24

## 2023-08-23 RX ORDER — GADOBUTROL 604.72 MG/ML
9 INJECTION INTRAVENOUS
Status: COMPLETED | OUTPATIENT
Start: 2023-08-23 | End: 2023-08-23

## 2023-08-23 RX ADMIN — Medication 3 MG: at 21:06

## 2023-08-23 RX ADMIN — GABAPENTIN 800 MG: 400 CAPSULE ORAL at 05:42

## 2023-08-23 RX ADMIN — METOCLOPRAMIDE 10 MG: 5 INJECTION, SOLUTION INTRAMUSCULAR; INTRAVENOUS at 13:31

## 2023-08-23 RX ADMIN — PANTOPRAZOLE SODIUM 40 MG: 40 TABLET, DELAYED RELEASE ORAL at 05:42

## 2023-08-23 RX ADMIN — HEPARIN SODIUM 5000 UNITS: 5000 INJECTION INTRAVENOUS; SUBCUTANEOUS at 21:06

## 2023-08-23 RX ADMIN — MAGNESIUM SULFATE HEPTAHYDRATE 2 G: 40 INJECTION, SOLUTION INTRAVENOUS at 13:29

## 2023-08-23 RX ADMIN — GABAPENTIN 800 MG: 400 CAPSULE ORAL at 18:47

## 2023-08-23 RX ADMIN — HEPARIN SODIUM 5000 UNITS: 5000 INJECTION INTRAVENOUS; SUBCUTANEOUS at 05:42

## 2023-08-23 RX ADMIN — LORAZEPAM 0.5 MG: 2 INJECTION INTRAMUSCULAR; INTRAVENOUS at 16:42

## 2023-08-23 RX ADMIN — FOLIC ACID 1 MG: 1 TABLET ORAL at 09:44

## 2023-08-23 RX ADMIN — SODIUM CHLORIDE 50 ML/HR: 0.9 INJECTION, SOLUTION INTRAVENOUS at 13:28

## 2023-08-23 RX ADMIN — GABAPENTIN 800 MG: 400 CAPSULE ORAL at 13:03

## 2023-08-23 RX ADMIN — ATORVASTATIN CALCIUM 40 MG: 40 TABLET, FILM COATED ORAL at 18:47

## 2023-08-23 RX ADMIN — CEFTRIAXONE 1000 MG: 1 INJECTION, SOLUTION INTRAVENOUS at 19:58

## 2023-08-23 RX ADMIN — GADOBUTROL 9 ML: 604.72 INJECTION INTRAVENOUS at 18:57

## 2023-08-23 RX ADMIN — LORAZEPAM 0.5 MG: 2 INJECTION INTRAMUSCULAR; INTRAVENOUS at 17:18

## 2023-08-23 RX ADMIN — FERROUS SULFATE TAB 325 MG (65 MG ELEMENTAL FE) 325 MG: 325 (65 FE) TAB at 09:44

## 2023-08-23 RX ADMIN — CLOPIDOGREL BISULFATE 75 MG: 75 TABLET ORAL at 09:44

## 2023-08-23 RX ADMIN — HYDRALAZINE HYDROCHLORIDE 50 MG: 25 TABLET ORAL at 21:06

## 2023-08-23 RX ADMIN — HEPARIN SODIUM 5000 UNITS: 5000 INJECTION INTRAVENOUS; SUBCUTANEOUS at 13:34

## 2023-08-23 NOTE — ASSESSMENT & PLAN NOTE
· Presented with dizziness and double vision  · CT head wo contrast: no acute abnormality  · On stroke pathway  · MRI pending  · Echo reviewed shows concentric hypertrophy severe to moderate 70% hyperdynamic  · Allergy to ASA noted, cont statin and plavix  · Tele monitoring  · Neurology consult

## 2023-08-23 NOTE — PROGRESS NOTES
[address]  Progress Note  Name: Sharon Hernandez  MRN: [id number]  Unit/Bed#: -01 I Date of Admission: 8/21/2023   Date of Service: 8/23/2023 I Hospital Day: 0    Assessment/Plan   * Stroke-like symptoms  Assessment & Plan  · Presented with dizziness and double vision  · CT head wo contrast: no acute abnormality  · On stroke pathway  · MRI pending  · Echo reviewed shows concentric hypertrophy severe to moderate 70% hyperdynamic  · Allergy to ASA noted, cont statin and plavix  · Tele monitoring  · Neurology consult    Intractable headache  Assessment & Plan  · No history of migraines  · Patient reacted well to low-dose of Reglan and magnesium yesterday  · discussed with neurology, repeat dose ordered  · MRI pending    Fall  Assessment & Plan  · Reports fall today due to dizziness  · Fall precautions  · PT/OT evaluation  · TSh normal  · Check B12    UTI (urinary tract infection)  Assessment & Plan  · UA negative for nitrites and trace leuks, innumerable bacteria  · Will treat even though currently has no dysuria as pt presented with dizziness, which can be caused by infection    JOVANA (acute kidney injury) ([address])  Assessment & Plan  · Resolved now  · Creatinine elevated 1.84 on admission (baseline around 1.5 per care everywhere)  · Continue IV fluids  · Monitor creatinine    Multiple sclerosis (HCC)  Assessment & Plan  · Continue teriflunomide  · Will bring from home if needed  · MRI  Brain pending  · Outpatient Neurology follow up    Essential hypertension  Assessment & Plan  /99 (BP Location: Left arm)   Pulse 69   Temp (!) 97.4 °F (36.3 °C) (Oral)   Resp 16   Ht 5' 11" (1.803 m)   Wt 90.7 kg (200 lb)   SpO2 95%   BMI 27.89 kg/m²   · Achieve normotension, avoid hypotension  · Cont losartan, nifedipine, hydralazine               VTE Pharmacologic Prophylaxis: VTE Score: 11 High Risk (Score >/= 5) - Pharmacological DVT Prophylaxis Ordered: heparin.  Sequential Compression Devices Ordered. Patient Centered Rounds: I performed bedside rounds with nursing staff today. Discussions with Specialists or Other Care Team Provider: yes [email or web address]    Education and Discussions with Family / Patient: Patient declined call to . Total Time Spent on Date of Encounter in care of patient: 25 minutes This time was spent on one or more of the following: performing physical exam; counseling and coordination of care; obtaining or reviewing history; documenting in the medical record; reviewing/ordering tests, medications or procedures; communicating with other healthcare professionals and discussing with patient's family/caregivers. Current Length of Stay: 0 day(s)  Current Patient Status: Observation   Certification Statement: The patient will continue to require additional inpatient hospital stay due to intractable headache, MRI  Discharge Plan: Anticipate discharge tomorrow to home. with [facility],[id number]    Code Status: Level 1 - Full Code    Subjective:   Seen and examined at bedside  C/o 8/10 generalized headache  Associated lightheadedness and dizziness  No cp sob nvd cough or fever    Objective:     Vitals:   Temp (24hrs), Av.8 °F (36.6 °C), Min:97.4 °F (36.3 °C), Max:98.2 °F (36.8 °C)    Temp:  [97.4 °F (36.3 °C)-98.2 °F (36.8 °C)] 97.4 °F (36.3 °C)  HR:  [63-93] 69  Resp:  [16-22] 16  BP: (128-150)/(81-99) 150/99  SpO2:  [92 %-98 %] 95 %  Body mass index is 27.89 kg/m². Input and Output Summary (last 24 hours):      Intake/Output Summary (Last 24 hours) at 2023 1228  Last data filed at 2023  Gross per 24 hour   Intake 1000 ml   Output --   Net 1000 ml       Physical Exam:   Physical Exam General- Awake, alert and oriented x 3, looks comfortable  HEENT- Normocephalic, atraumatic, oral mucosa- moist  Neck- Supple, No carotid bruit, no JVD  CVS- Normal S1/ S2, Regular rate and rhythm, No murmur, No edema  Respiratory system- B/L clear breath sounds, no wheezing  Abdomen- Soft, Non distended, no tenderness, Bowel sound- present 4 quads  Genitourinary- No suprapubic tenderness, No CVA tenderness  Skin- No new bruise or rash  Musculoskeletal- No gross deformity  Psych- No acute psychosis  CNS- left sided motor and sensory weakness noted,  CN II- XII grossly intact      Additional Data:     Labs:  Results from last 7 days   Lab Units 23  0456   WBC Thousand/uL 6.65   HEMOGLOBIN g/dL 11.1*   HEMATOCRIT % 36.0   PLATELETS Thousands/uL 229   NEUTROS PCT % 55   LYMPHS PCT % 26   MONOS PCT % 9   EOS PCT % 9*     Results from last 7 days   Lab Units 23  0456 23  0414 23  1545   SODIUM mmol/L 140   < > 140   POTASSIUM mmol/L 4.4   < > 3.9   CHLORIDE mmol/L 108   < > 104   CO2 mmol/L 27   < > 27   BUN mg/dL 22   < > 14   CREATININE mg/dL 1.28   < > 1.84*   ANION GAP mmol/L 5   < > 9   CALCIUM mg/dL 9.4   < > 10.7*   ALBUMIN g/dL  --   --  4.9   TOTAL BILIRUBIN mg/dL  --   --  0.35   ALK PHOS U/L  --   --  136*   ALT U/L  --   --  17   AST U/L  --   --  21   GLUCOSE RANDOM mg/dL 94   < > 103    < > = values in this interval not displayed. Results from last 7 days   Lab Units 23  0414   HEMOGLOBIN A1C % 6.5*     Results from last 7 days   Lab Units 23  1923 23  1703   LACTIC ACID mmol/L  --  1.4   PROCALCITONIN ng/ml 0.08  --        Lines/Drains:  Invasive Devices     Peripheral Intravenous Line  Duration           Peripheral IV 23 Right Antecubital 1 day                  Telemetry:  Telemetry Orders (From admission, onward)             24 Hour Telemetry Monitoring  Continuous x 24 Hours (Telem)           Question:  Reason for 24 Hour Telemetry  Answer:  TIA/Suspected CVA/ Confirmed CVA                 Telemetry Reviewed: Normal Sinus Rhythm  Indication for Continued Telemetry Use: Acute CVA             Imaging: No pertinent imaging reviewed.     Recent Cultures (last 7 days):   Results from last 7 days   Lab Units 23 08/21/23 1921   BLOOD CULTURE  No Growth at 24 hrs. No Growth at 24 hrs. Last 24 Hours Medication List:   Current Facility-Administered Medications   Medication Dose Route Frequency Provider Last Rate   • atorvastatin  40 mg Oral Daily With Dinner Lily Fajardo PA-C     • cefTRIAXone  1,000 mg Intravenous Q24H Lily Fajardo PA-C 1,000 mg (08/22/23 1827)   • clopidogrel  75 mg Oral Daily Emilia Howard PA-C     • ferrous sulfate  325 mg Oral Daily With Breakfast Emilia Howard PA-C     • folic acid  1 mg Oral Daily Emilia Howard PA-C     • gabapentin  800 mg Oral 4x Daily An MAIKOL Kaur     • heparin (porcine)  5,000 Units Subcutaneous Q8H [address] Emilia Howard PA-C     • iohexol  100 mL Intravenous Once in imaging Lily Fajardo PA-C     • LORazepam  0.5 mg Intravenous Once Kailash James MD     • magnesium sulfate  2 g Intravenous Once Kailash James MD     • melatonin  3 mg Oral HS Emilia Howard PA-C     • metoclopramide  10 mg Intravenous Once Kailash James MD     • nicotine  1 patch Transdermal Daily Emilia Howard PA-C     • pantoprazole  40 mg Oral Early Morning Emilia Howard PA-C     • sodium chloride  50 mL/hr Intravenous Continuous Lily Fajardo PA-C 50 mL/hr (08/22/23 2026)        Today, Patient Was Seen By: Kailash James MD    **Please Note: This note may have been constructed using a voice recognition system. **

## 2023-08-23 NOTE — ASSESSMENT & PLAN NOTE
· Resolved now  · Creatinine elevated 1.84 on admission (baseline around 1.5 per care everywhere)  · Continue IV fluids  · Monitor creatinine

## 2023-08-23 NOTE — PHYSICAL THERAPY NOTE
Physical Therapy Treatment Note    Patient Name: Sintia Tello    Diagnosis: Emphysematous cystitis [N30.80]  Dizziness [R42]  Hypotension [I95.9]  Acute on chronic renal insufficiency [N28.9, N18.9]  Hypotensive episode [I95.9]  Stroke-like symptoms [R29.90]  Fall, initial encounter [W19. XXXA]  Double vision with both eyes open [H53.2]  Headache [R51.9]     08/23/23 1335   PT Last Visit   PT Visit Date 08/23/23   Note Type   Note Type Treatment   Pain Assessment   Pain Assessment Tool 0-10   Pain Score 8   Pain Location/Orientation Location: Head   Pain Onset/Description Onset: Ongoing; Descriptor: Headache   Hospital Pain Intervention(s) Repositioned; Ambulation/increased activity  (RN aware)   Restrictions/Precautions   Weight Bearing Precautions Per Order No   Other Precautions Chair Alarm; Bed Alarm;Multiple lines; Fall Risk;Pain;Visual impairment  (pt report of double vision)   General   Chart Reviewed Yes   Response to Previous Treatment Patient with no complaints from previous session. Family/Caregiver Present Yes  (pt's roommate arrived during session)   Cognition   Overall Cognitive Status WFL   Arousal/Participation Alert; Responsive; Cooperative   Attention Within functional limits   Orientation Level Oriented X4   Memory Within functional limits   Following Commands Follows all commands and directions without difficulty   Comments Pt agreeable to PT. Subjective   Subjective "I'm still dizzy, but I want to try and walk."   Bed Mobility   Supine to Sit 6  Modified independent   Additional items HOB elevated; Bedrails; Increased time required;Verbal cues   Transfers   Sit to Stand 4  Minimal assistance   Additional items Assist x 1; Increased time required;Verbal cues   Stand to Sit 4  Minimal assistance   Additional items Assist x 1; Increased time required;Verbal cues   Additional Comments Pt with signifcant forward flexed trunk with sit to stand transfer   Ambulation/Elevation   Gait pattern Decreased toe off;Decreased heel strike;Decreased hip extension; Excessively slow; Step to;Short stride; Foward flexed; Shuffling;Decreased L stance;Decreased foot clearance  (decreased left foot clearance; increased left knee flexion during stanc phase)   Gait Assistance 3  Moderate assist   Additional items Assist x 1;Verbal cues   Assistive Device Rolling walker   Distance 30 feet   Balance   Static Sitting Fair +   Dynamic Sitting Fair   Static Standing Poor +   Dynamic Standing Poor   Ambulatory Poor   Endurance Deficit   Endurance Deficit Yes   Endurance Deficit Description decreased activity tolerance   Activity Tolerance   Activity Tolerance Patient tolerated treatment well   Nurse Made Aware JANA Bryson made aware of session outcomes   Exercises   Hip Flexion Sitting;10 reps;AROM; Bilateral   Hip Abduction Sitting;10 reps;AROM; Bilateral  (long sitting)   Knee AROM Long Arc Quad Sitting;20 reps;AROM; Bilateral   Assessment   Prognosis Good   Problem List Decreased strength;Decreased endurance; Impaired balance;Decreased mobility; Impaired vision; Impaired sensation;Pain   Assessment Chart reviewed. Patient was received supine in bed in NAD and agreeable to PT session. Today's PT treatment session consisted of therapeutic activity for facilitation of transitional movements and safe performance of correct technique for bed mobility and sit to stand transfers, therapeutic exercise to increase lower extremity muscle strength, and gait training to promote safe and functional ambulation on level surfaces. In comparison to the previous session the patient has made progress as evident by ability to perform all functional mobility with assist of one. Pt was able to ambulate an increased distance with use of RW and without a chair follow. When ambulating pt exhibits decreased left foot clearance, a forward flexed trunk, decreased genet, decreased heel strike, and increased left knee flexion during stance phase.  Pt tolerated all therapeutic exercise well without complaints. Pt with intermittent complaints of persistent dizziness, not resolved with a seated rest break. Overall, patient tolerated today's session well and continues to be making progress towards achieving her STG's. Patient's prognosis for achieving their STG's is good as evident by pt's motivation. PT intervention continues to be appropriate as the patient continues to be limited by pain, decreased lower extremity strength, impaired balance, decreased endurance, gait deviations, and decreased functional mobility. Continue to recommend STR. PT to continue to see patient in order to address the deficits listed above and provide interventions consistent with the POC in order to achieve STG's and optimize the patient's independence with functional mobility. Barriers to Discharge Inaccessible home environment   Goals   STG Expiration Date 09/01/23   PT Treatment Day 1   Plan   Treatment/Interventions Functional transfer training;LE strengthening/ROM; Elevations; Therapeutic exercise; Endurance training;Patient/family training;Bed mobility;Gait training;Spoke to nursing;OT;Family   Progress Progressing toward goals   PT Frequency 3-5x/wk   Recommendation   PT Discharge Recommendation Post acute rehabilitation services   AM-PAC Basic Mobility Inpatient   Turning in Flat Bed Without Bedrails 4   Lying on Back to Sitting on Edge of Flat Bed Without Bedrails 4   Moving Bed to Chair 2   Standing Up From Chair Using Arms 3   Walk in Room 2   Climb 3-5 Stairs With Railing 1   Basic Mobility Inpatient Raw Score 16   Basic Mobility Standardized Score 38.32   Highest Level Of Mobility   JH-HLM Goal 5: Stand one or more mins   JH-HLM Achieved 7: Walk 25 feet or more   Education   Education Provided Mobility training;Home exercise program;Assistive device   Patient Demonstrates acceptance/verbal understanding;Reinforcement needed   End of Consult   Patient Position at End of Consult Bedside chair;Bed/Chair alarm activated; All needs within reach  (RN made aware)     Cristian Huntley, PT, DPT    Time of PT treatment session: [id number]  23 minutes

## 2023-08-23 NOTE — ASSESSMENT & PLAN NOTE
· No history of migraines  · Patient reacted well to low-dose of Reglan and magnesium yesterday  · discussed with neurology, repeat dose ordered  · MRI pending

## 2023-08-23 NOTE — PLAN OF CARE
Problem: Potential for Falls  Goal: Patient will remain free of falls  Description: INTERVENTIONS:  - Educate patient/family on patient safety including physical limitations  - Instruct patient to call for assistance with activity   - Consult OT/PT to assist with strengthening/mobility   - Keep Call bell within reach  - Keep bed low and locked with side rails adjusted as appropriate  - Keep care items and personal belongings within reach  - Initiate and maintain comfort rounds  - Make Fall Risk Sign visible to staff  - Offer Toileting every 3 Hours, in advance of need  - Initiate/Maintain bed alarm  - Obtain necessary fall risk management equipment:   - Apply yellow socks and bracelet for high fall risk patients  - Consider moving patient to room near nurses station  Outcome: Progressing     Problem: SAFETY ADULT  Goal: Patient will remain free of falls  Description: INTERVENTIONS:  - Educate patient/family on patient safety including physical limitations  - Instruct patient to call for assistance with activity   - Consult OT/PT to assist with strengthening/mobility   - Keep Call bell within reach  - Keep bed low and locked with side rails adjusted as appropriate  - Keep care items and personal belongings within reach  - Initiate and maintain comfort rounds  - Make Fall Risk Sign visible to staff  - Offer Toileting every 3 Hours, in advance of need  - Initiate/Maintain bed alarm  - Obtain necessary fall risk management equipment:   - Apply yellow socks and bracelet for high fall risk patients  - Consider moving patient to room near nurses station  Outcome: Progressing  Goal: Maintain or return to baseline ADL function  Description: INTERVENTIONS:  -  Assess patient's ability to carry out ADLs; assess patient's baseline for ADL function and identify physical deficits which impact ability to perform ADLs (bathing, care of mouth/teeth, toileting, grooming, dressing, etc.)  - Assess/evaluate cause of self-care deficits   - Assess range of motion  - Assess patient's mobility; develop plan if impaired  - Assess patient's need for assistive devices and provide as appropriate  - Encourage maximum independence but intervene and supervise when necessary  - Involve family in performance of ADLs  - Assess for home care needs following discharge   - Consider OT consult to assist with ADL evaluation and planning for discharge  - Provide patient education as appropriate  Outcome: Progressing  Goal: Maintains/Returns to pre admission functional level  Description: INTERVENTIONS:  - Perform BMAT or MOVE assessment daily.   - Set and communicate daily mobility goal to care team and patient/family/caregiver. - Collaborate with rehabilitation services on mobility goals if consulted  - Perform Range of Motion 3 times a day. - Reposition patient every 2 hours.   - Dangle patient 3 times a day  - Stand patient 3 times a day  - Ambulate patient 3 times a day  - Out of bed to chair 3 times a day   - Out of bed for meals 3 times a day  - Out of bed for toileting  - Record patient progress and toleration of activity level   Outcome: Progressing     Problem: DISCHARGE PLANNING  Goal: Discharge to home or other facility with appropriate resources  Description: INTERVENTIONS:  - Identify barriers to discharge w/patient and caregiver  - Arrange for needed discharge resources and transportation as appropriate  - Identify discharge learning needs (meds, wound care, etc.)  - Arrange for interpretive services to assist at discharge as needed  - Refer to Case Management Department for coordinating discharge planning if the patient needs post-hospital services based on physician/advanced practitioner order or complex needs related to functional status, cognitive ability, or social support system  Outcome: Progressing     Problem: Knowledge Deficit  Goal: Patient/family/caregiver demonstrates understanding of disease process, treatment plan, medications, and discharge instructions  Description: Complete learning assessment and assess knowledge base. Interventions:  - Provide teaching at level of understanding  - Provide teaching via preferred learning methods  Outcome: Progressing     Problem: Neurological Deficit  Goal: Neurological status is stable or improving  Description: Interventions:  - Monitor and assess patient's level of consciousness, motor function, sensory function, and level of assistance needed for ADLs. - Monitor and report changes from baseline. Collaborate with interdisciplinary team to initiate plan and implement interventions as ordered. - Provide and maintain a safe environment. - Consider seizure precautions. - Consider fall precautions. - Consider aspiration precautions. - Consider bleeding precautions. Outcome: Progressing     Problem: Activity Intolerance/Impaired Mobility  Goal: Mobility/activity is maintained at optimum level for patient  Description: Interventions:  - Assess and monitor patient  barriers to mobility and need for assistive/adaptive devices. - Assess patient's emotional response to limitations. - Collaborate with interdisciplinary team and initiate plans and interventions as ordered. - Encourage independent activity per ability.  - Maintain proper body alignment. - Perform active/passive rom as tolerated/ordered. - Plan activities to conserve energy.  - Turn patient as appropriate  Outcome: Progressing     Problem: Communication Impairment  Goal: Ability to express needs and understand communication  Description: Assess patient's communication skills and ability to understand information. Patient will demonstrate use of effective communication techniques, alternative methods of communication and understanding even if not able to speak. - Encourage communication and provide alternate methods of communication as needed.   - Collaborate with case management/ for discharge needs.  - Include patient/family/caregiver in decisions related to communication. Outcome: Progressing     Problem: Potential for Aspiration  Goal: Non-ventilated patient's risk of aspiration is minimized  Description: Assess and monitor vital signs, respiratory status, and labs (WBC). Monitor for signs of aspiration (tachypnea, cough, rales, wheezing, cyanosis, fever). - Assess and monitor patient's ability to swallow. - Place patient up in chair to eat if possible. - HOB up at 90 degrees to eat if unable to get patient up into chair.  - Supervise patient during oral intake. - Instruct patient/ family to take small bites. - Instruct patient/ family to take small single sips when taking liquids. - Follow patient-specific strategies generated by speech pathologist.  Outcome: Progressing  Goal: Ventilated patient's risk of aspiration is minimized  Description: Assess and monitor vital signs, respiratory status, airway cuff pressure, and labs (WBC). Monitor for signs of aspiration (tachypnea, cough, rales, wheezing, cyanosis, fever). - Elevate head of bed 30 degrees if patient has tube feeding.  - Monitor tube feeding.   Outcome: Progressing     Problem: MOBILITY - ADULT  Goal: Maintain or return to baseline ADL function  Description: INTERVENTIONS:  -  Assess patient's ability to carry out ADLs; assess patient's baseline for ADL function and identify physical deficits which impact ability to perform ADLs (bathing, care of mouth/teeth, toileting, grooming, dressing, etc.)  - Assess/evaluate cause of self-care deficits   - Assess range of motion  - Assess patient's mobility; develop plan if impaired  - Assess patient's need for assistive devices and provide as appropriate  - Encourage maximum independence but intervene and supervise when necessary  - Involve family in performance of ADLs  - Assess for home care needs following discharge   - Consider OT consult to assist with ADL evaluation and planning for discharge  - Provide patient education as appropriate  Outcome: Progressing  Goal: Maintains/Returns to pre admission functional level  Description: INTERVENTIONS:  - Perform BMAT or MOVE assessment daily.   - Set and communicate daily mobility goal to care team and patient/family/caregiver. - Collaborate with rehabilitation services on mobility goals if consulted  - Perform Range of Motion 3 times a day. - Reposition patient every 2 hours.   - Dangle patient 3 times a day  - Stand patient 3 times a day  - Ambulate patient 3 times a day  - Out of bed to chair 3 times a day   - Out of bed for meals 3 times a day  - Out of bed for toileting  - Record patient progress and toleration of activity level   Outcome: Progressing

## 2023-08-23 NOTE — PLAN OF CARE
Problem: Potential for Falls  Goal: Patient will remain free of falls  Description: INTERVENTIONS:  - Educate patient/family on patient safety including physical limitations  - Instruct patient to call for assistance with activity   - Consult OT/PT to assist with strengthening/mobility   - Keep Call bell within reach  - Keep bed low and locked with side rails adjusted as appropriate  - Keep care items and personal belongings within reach  - Initiate and maintain comfort rounds  - Make Fall Risk Sign visible to staff  - Offer Toileting every  Hours, in advance of need  - Initiate/Maintain alarm  - Obtain necessary fall risk management equipment:   - Apply yellow socks and bracelet for high fall risk patients  - Consider moving patient to room near nurses station  Outcome: Progressing     Problem: SAFETY ADULT  Goal: Patient will remain free of falls  Description: INTERVENTIONS:  - Educate patient/family on patient safety including physical limitations  - Instruct patient to call for assistance with activity   - Consult OT/PT to assist with strengthening/mobility   - Keep Call bell within reach  - Keep bed low and locked with side rails adjusted as appropriate  - Keep care items and personal belongings within reach  - Initiate and maintain comfort rounds  - Make Fall Risk Sign visible to staff  - Offer Toileting every  Hours, in advance of need  - Initiate/Maintain alarm  - Obtain necessary fall risk management equipment:   - Apply yellow socks and bracelet for high fall risk patients  - Consider moving patient to room near nurses station  Outcome: Progressing  Goal: Maintain or return to baseline ADL function  Description: INTERVENTIONS:  -  Assess patient's ability to carry out ADLs; assess patient's baseline for ADL function and identify physical deficits which impact ability to perform ADLs (bathing, care of mouth/teeth, toileting, grooming, dressing, etc.)  - Assess/evaluate cause of self-care deficits   - Assess range of motion  - Assess patient's mobility; develop plan if impaired  - Assess patient's need for assistive devices and provide as appropriate  - Encourage maximum independence but intervene and supervise when necessary  - Involve family in performance of ADLs  - Assess for home care needs following discharge   - Consider OT consult to assist with ADL evaluation and planning for discharge  - Provide patient education as appropriate  Outcome: Progressing  Goal: Maintains/Returns to pre admission functional level  Description: INTERVENTIONS:  - Perform BMAT or MOVE assessment daily.   - Set and communicate daily mobility goal to care team and patient/family/caregiver. - Collaborate with rehabilitation services on mobility goals if consulted  - Perform Range of Motion  times a day. - Reposition patient every  hours.   - Dangle patient  times a day  - Stand patient  times a day  - Ambulate patient  times a day  - Out of bed to chair  times a day   - Out of bed for meals  times a day  - Out of bed for toileting  - Record patient progress and toleration of activity level   Outcome: Progressing     Problem: DISCHARGE PLANNING  Goal: Discharge to home or other facility with appropriate resources  Description: INTERVENTIONS:  - Identify barriers to discharge w/patient and caregiver  - Arrange for needed discharge resources and transportation as appropriate  - Identify discharge learning needs (meds, wound care, etc.)  - Arrange for interpretive services to assist at discharge as needed  - Refer to Case Management Department for coordinating discharge planning if the patient needs post-hospital services based on physician/advanced practitioner order or complex needs related to functional status, cognitive ability, or social support system  Outcome: Progressing     Problem: Knowledge Deficit  Goal: Patient/family/caregiver demonstrates understanding of disease process, treatment plan, medications, and discharge instructions  Description: Complete learning assessment and assess knowledge base. Interventions:  - Provide teaching at level of understanding  - Provide teaching via preferred learning methods  Outcome: Progressing     Problem: Neurological Deficit  Goal: Neurological status is stable or improving  Description: Interventions:  - Monitor and assess patient's level of consciousness, motor function, sensory function, and level of assistance needed for ADLs. - Monitor and report changes from baseline. Collaborate with interdisciplinary team to initiate plan and implement interventions as ordered. - Provide and maintain a safe environment. - Consider seizure precautions. - Consider fall precautions. - Consider aspiration precautions. - Consider bleeding precautions. Outcome: Progressing     Problem: Activity Intolerance/Impaired Mobility  Goal: Mobility/activity is maintained at optimum level for patient  Description: Interventions:  - Assess and monitor patient  barriers to mobility and need for assistive/adaptive devices. - Assess patient's emotional response to limitations. - Collaborate with interdisciplinary team and initiate plans and interventions as ordered. - Encourage independent activity per ability.  - Maintain proper body alignment. - Perform active/passive rom as tolerated/ordered. - Plan activities to conserve energy.  - Turn patient as appropriate  Outcome: Progressing     Problem: Communication Impairment  Goal: Ability to express needs and understand communication  Description: Assess patient's communication skills and ability to understand information. Patient will demonstrate use of effective communication techniques, alternative methods of communication and understanding even if not able to speak. - Encourage communication and provide alternate methods of communication as needed. - Collaborate with case management/ for discharge needs.   - Include patient/family/caregiver in decisions related to communication. Outcome: Progressing     Problem: Potential for Aspiration  Goal: Non-ventilated patient's risk of aspiration is minimized  Description: Assess and monitor vital signs, respiratory status, and labs (WBC). Monitor for signs of aspiration (tachypnea, cough, rales, wheezing, cyanosis, fever). - Assess and monitor patient's ability to swallow. - Place patient up in chair to eat if possible. - HOB up at 90 degrees to eat if unable to get patient up into chair.  - Supervise patient during oral intake. - Instruct patient/ family to take small bites. - Instruct patient/ family to take small single sips when taking liquids. - Follow patient-specific strategies generated by speech pathologist.  Outcome: Progressing  Goal: Ventilated patient's risk of aspiration is minimized  Description: Assess and monitor vital signs, respiratory status, airway cuff pressure, and labs (WBC). Monitor for signs of aspiration (tachypnea, cough, rales, wheezing, cyanosis, fever). - Elevate head of bed 30 degrees if patient has tube feeding.  - Monitor tube feeding.   Outcome: Progressing     Problem: MOBILITY - ADULT  Goal: Maintain or return to baseline ADL function  Description: INTERVENTIONS:  -  Assess patient's ability to carry out ADLs; assess patient's baseline for ADL function and identify physical deficits which impact ability to perform ADLs (bathing, care of mouth/teeth, toileting, grooming, dressing, etc.)  - Assess/evaluate cause of self-care deficits   - Assess range of motion  - Assess patient's mobility; develop plan if impaired  - Assess patient's need for assistive devices and provide as appropriate  - Encourage maximum independence but intervene and supervise when necessary  - Involve family in performance of ADLs  - Assess for home care needs following discharge   - Consider OT consult to assist with ADL evaluation and planning for discharge  - Provide patient education as appropriate  Outcome: Progressing  Goal: Maintains/Returns to pre admission functional level  Description: INTERVENTIONS:  - Perform BMAT or MOVE assessment daily.   - Set and communicate daily mobility goal to care team and patient/family/caregiver. - Collaborate with rehabilitation services on mobility goals if consulted  - Perform Range of Motion  times a day. - Reposition patient every  hours.   - Dangle patient times a day  - Stand patient  times a day  - Ambulate patient times a day  - Out of bed to chair  times a day   - Out of bed for meals  times a day  - Out of bed for toileting  - Record patient progress and toleration of activity level   Outcome: Progressing

## 2023-08-23 NOTE — PLAN OF CARE
Problem: PHYSICAL THERAPY ADULT  Goal: Performs mobility at highest level of function for planned discharge setting. See evaluation for individualized goals. Description: Treatment/Interventions: Functional transfer training, LE strengthening/ROM, Elevations, Therapeutic exercise, Endurance training, Patient/family training, Bed mobility, Gait training, Spoke to nursing, OT          See flowsheet documentation for full assessment, interventions and recommendations. Outcome: Progressing  Note: Prognosis: Good  Problem List: Decreased strength, Decreased endurance, Impaired balance, Decreased mobility, Impaired vision, Impaired sensation, Pain  Assessment: Chart reviewed. Patient was received supine in bed in NAD and agreeable to PT session. Today's PT treatment session consisted of therapeutic activity for facilitation of transitional movements and safe performance of correct technique for bed mobility and sit to stand transfers, therapeutic exercise to increase lower extremity muscle strength, and gait training to promote safe and functional ambulation on level surfaces. In comparison to the previous session the patient has made progress as evident by ability to perform all functional mobility with assist of one. Pt was able to ambulate an increased distance with use of RW and without a chair follow. When ambulating pt exhibits decreased left foot clearance, a forward flexed trunk, decreased genet, decreased heel strike, and increased left knee flexion during stance phase. Pt tolerated all therapeutic exercise well without complaints. Pt with intermittent complaints of persistent dizziness, not resolved with a seated rest break. Overall, patient tolerated today's session well and continues to be making progress towards achieving her STG's. Patient's prognosis for achieving their STG's is good as evident by pt's motivation.  PT intervention continues to be appropriate as the patient continues to be limited by pain, decreased lower extremity strength, impaired balance, decreased endurance, gait deviations, and decreased functional mobility. Continue to recommend STR. PT to continue to see patient in order to address the deficits listed above and provide interventions consistent with the POC in order to achieve STG's and optimize the patient's independence with functional mobility. Barriers to Discharge: Inaccessible home environment     PT Discharge Recommendation: Post acute rehabilitation services    See flowsheet documentation for full assessment.

## 2023-08-23 NOTE — ASSESSMENT & PLAN NOTE
· UA negative for nitrites and trace leuks, innumerable bacteria  · Will treat even though currently has no dysuria as pt presented with dizziness, which can be caused by infection

## 2023-08-23 NOTE — ASSESSMENT & PLAN NOTE
/99 (BP Location: Left arm)   Pulse 69   Temp (!) 97.4 °F (36.3 °C) (Oral)   Resp 16   Ht 5' 11" (1.803 m)   Wt 90.7 kg (200 lb)   SpO2 95%   BMI 27.89 kg/m²   · Achieve normotension, avoid hypotension  · Cont losartan, nifedipine, hydralazine

## 2023-08-23 NOTE — ASSESSMENT & PLAN NOTE
· Continue teriflunomide  · Will bring from home if needed  · MRI  Brain pending  · Outpatient Neurology follow up

## 2023-08-24 ENCOUNTER — TELEPHONE (OUTPATIENT)
Dept: NEPHROLOGY | Facility: CLINIC | Age: 63
End: 2023-08-24

## 2023-08-24 VITALS
OXYGEN SATURATION: 95 % | HEART RATE: 74 BPM | SYSTOLIC BLOOD PRESSURE: 157 MMHG | DIASTOLIC BLOOD PRESSURE: 101 MMHG | RESPIRATION RATE: 18 BRPM | BODY MASS INDEX: 28.46 KG/M2 | HEIGHT: 71 IN | WEIGHT: 203.26 LBS | TEMPERATURE: 97.7 F

## 2023-08-24 LAB
ANION GAP SERPL CALCULATED.3IONS-SCNC: 5 MMOL/L
BASOPHILS # BLD AUTO: 0.03 THOUSANDS/ÂΜL (ref 0–0.1)
BASOPHILS NFR BLD AUTO: 0 % (ref 0–1)
BUN SERPL-MCNC: 20 MG/DL (ref 5–25)
CALCIUM SERPL-MCNC: 9.2 MG/DL (ref 8.4–10.2)
CHLORIDE SERPL-SCNC: 109 MMOL/L (ref 96–108)
CO2 SERPL-SCNC: 24 MMOL/L (ref 21–32)
CREAT SERPL-MCNC: 1.11 MG/DL (ref 0.6–1.3)
EOSINOPHIL # BLD AUTO: 0.65 THOUSAND/ÂΜL (ref 0–0.61)
EOSINOPHIL NFR BLD AUTO: 9 % (ref 0–6)
ERYTHROCYTE [DISTWIDTH] IN BLOOD BY AUTOMATED COUNT: 14.4 % (ref 11.6–15.1)
GFR SERPL CREATININE-BSD FRML MDRD: 52 ML/MIN/1.73SQ M
GLUCOSE SERPL-MCNC: 91 MG/DL (ref 65–140)
HCT VFR BLD AUTO: 35.1 % (ref 34.8–46.1)
HGB BLD-MCNC: 11.1 G/DL (ref 11.5–15.4)
IMM GRANULOCYTES # BLD AUTO: 0.02 THOUSAND/UL (ref 0–0.2)
IMM GRANULOCYTES NFR BLD AUTO: 0 % (ref 0–2)
LYMPHOCYTES # BLD AUTO: 1.82 THOUSANDS/ÂΜL (ref 0.6–4.47)
LYMPHOCYTES NFR BLD AUTO: 26 % (ref 14–44)
MAGNESIUM SERPL-MCNC: 2.3 MG/DL (ref 1.9–2.7)
MCH RBC QN AUTO: 29.1 PG (ref 26.8–34.3)
MCHC RBC AUTO-ENTMCNC: 31.6 G/DL (ref 31.4–37.4)
MCV RBC AUTO: 92 FL (ref 82–98)
MONOCYTES # BLD AUTO: 0.55 THOUSAND/ÂΜL (ref 0.17–1.22)
MONOCYTES NFR BLD AUTO: 8 % (ref 4–12)
NEUTROPHILS # BLD AUTO: 4 THOUSANDS/ÂΜL (ref 1.85–7.62)
NEUTS SEG NFR BLD AUTO: 57 % (ref 43–75)
NRBC BLD AUTO-RTO: 0 /100 WBCS
PLATELET # BLD AUTO: 244 THOUSANDS/UL (ref 149–390)
PMV BLD AUTO: 11.2 FL (ref 8.9–12.7)
POTASSIUM SERPL-SCNC: 4.1 MMOL/L (ref 3.5–5.3)
RBC # BLD AUTO: 3.82 MILLION/UL (ref 3.81–5.12)
SODIUM SERPL-SCNC: 138 MMOL/L (ref 135–147)
WBC # BLD AUTO: 7.07 THOUSAND/UL (ref 4.31–10.16)

## 2023-08-24 PROCEDURE — 83735 ASSAY OF MAGNESIUM: CPT | Performed by: FAMILY MEDICINE

## 2023-08-24 PROCEDURE — 85025 COMPLETE CBC W/AUTO DIFF WBC: CPT | Performed by: FAMILY MEDICINE

## 2023-08-24 PROCEDURE — 99239 HOSP IP/OBS DSCHRG MGMT >30: CPT | Performed by: FAMILY MEDICINE

## 2023-08-24 PROCEDURE — 80048 BASIC METABOLIC PNL TOTAL CA: CPT | Performed by: FAMILY MEDICINE

## 2023-08-24 PROCEDURE — 99214 OFFICE O/P EST MOD 30 MIN: CPT | Performed by: STUDENT IN AN ORGANIZED HEALTH CARE EDUCATION/TRAINING PROGRAM

## 2023-08-24 RX ORDER — CALCIUM CARBONATE/VITAMIN D3 500-10/5ML
1 LIQUID (ML) ORAL DAILY
Qty: 30 CAPSULE | Refills: 0 | Status: SHIPPED | OUTPATIENT
Start: 2023-08-24

## 2023-08-24 RX ORDER — METOCLOPRAMIDE HYDROCHLORIDE 5 MG/ML
10 INJECTION INTRAMUSCULAR; INTRAVENOUS ONCE
Status: DISCONTINUED | OUTPATIENT
Start: 2023-08-24 | End: 2023-08-24 | Stop reason: HOSPADM

## 2023-08-24 RX ORDER — CEPHALEXIN 500 MG/1
500 CAPSULE ORAL EVERY 8 HOURS SCHEDULED
Qty: 9 CAPSULE | Refills: 0 | Status: SHIPPED | OUTPATIENT
Start: 2023-08-24 | End: 2023-08-27

## 2023-08-24 RX ORDER — MAGNESIUM SULFATE HEPTAHYDRATE 40 MG/ML
2 INJECTION, SOLUTION INTRAVENOUS ONCE
Status: COMPLETED | OUTPATIENT
Start: 2023-08-24 | End: 2023-08-24

## 2023-08-24 RX ORDER — HYDRALAZINE HYDROCHLORIDE 25 MG/1
75 TABLET, FILM COATED ORAL EVERY 8 HOURS SCHEDULED
Status: DISCONTINUED | OUTPATIENT
Start: 2023-08-24 | End: 2023-08-24 | Stop reason: HOSPADM

## 2023-08-24 RX ADMIN — CLOPIDOGREL BISULFATE 75 MG: 75 TABLET ORAL at 10:09

## 2023-08-24 RX ADMIN — NICOTINE 1 PATCH: 21 PATCH, EXTENDED RELEASE TRANSDERMAL at 10:10

## 2023-08-24 RX ADMIN — GABAPENTIN 800 MG: 400 CAPSULE ORAL at 00:44

## 2023-08-24 RX ADMIN — HYDRALAZINE HYDROCHLORIDE 50 MG: 25 TABLET ORAL at 06:12

## 2023-08-24 RX ADMIN — HEPARIN SODIUM 5000 UNITS: 5000 INJECTION INTRAVENOUS; SUBCUTANEOUS at 06:12

## 2023-08-24 RX ADMIN — PANTOPRAZOLE SODIUM 40 MG: 40 TABLET, DELAYED RELEASE ORAL at 06:12

## 2023-08-24 RX ADMIN — MAGNESIUM SULFATE HEPTAHYDRATE 2 G: 40 INJECTION, SOLUTION INTRAVENOUS at 10:21

## 2023-08-24 RX ADMIN — GABAPENTIN 800 MG: 400 CAPSULE ORAL at 06:12

## 2023-08-24 RX ADMIN — LOSARTAN POTASSIUM 50 MG: 50 TABLET, FILM COATED ORAL at 10:09

## 2023-08-24 RX ADMIN — NIFEDIPINE 120 MG: 30 TABLET, FILM COATED, EXTENDED RELEASE ORAL at 10:09

## 2023-08-24 RX ADMIN — FOLIC ACID 1 MG: 1 TABLET ORAL at 10:09

## 2023-08-24 RX ADMIN — FERROUS SULFATE TAB 325 MG (65 MG ELEMENTAL FE) 325 MG: 325 (65 FE) TAB at 10:09

## 2023-08-24 NOTE — ASSESSMENT & PLAN NOTE
· Improved, appears to be tension headache. Unable to take tylenol due to allergy. Can take nsaids. No history of migraines. Giving mag ox at DC for headaces.  Will need OP neuro followup  · Patient reacted well to low-dose of Reglan and magnesium yesterday  · discussed with neurology, repeat dose ordered  · MRI no CVA

## 2023-08-24 NOTE — ASSESSMENT & PLAN NOTE
· Presented with dizziness and double vision  · CT head wo contrast: no acute abnormality  · On stroke pathway  · MRI: no CVA, MRA no abnormality  · Echo reviewed shows concentric hypertrophy severe to moderate 70% hyperdynamic  · Allergy to ASA noted, cont statin and plavix  · Tele monitoring  · Neurology consult

## 2023-08-24 NOTE — ASSESSMENT & PLAN NOTE
· Reports fall today due to dizziness  · Fall precautions  · PT/OT evaluation  · TSh normal  · Check B12- 386, low normal, will benefit from supplementation.  initiate

## 2023-08-24 NOTE — ASSESSMENT & PLAN NOTE
· Resolved now  · Creatinine elevated 1.84 on admission (baseline around 1.5 per care everywhere)  · DC fluid if taking PO  · Monitor creatinine

## 2023-08-24 NOTE — ASSESSMENT & PLAN NOTE
· Continue teriflunomide  · Will bring from home if needed  · MRI  Brain shows stable demyelinating changes  · Outpatient Neurology follow up

## 2023-08-24 NOTE — PROGRESS NOTES
Progress Note - Neurology   Juan Pablo Lomeli 58 y.o. female [id number]  Unit/Bed#: /-01    Assessment:    * Stroke-like symptoms  Assessment & Plan  48-year-old female with prior stroke with residual left-sided weakness in 2016 on Plavix, MS on Aubagio, CAD, CKD, hypertension, migraines, tobacco use, history of cocaine use, who presents with strokelike symptoms. Patient initially presented after having a hypotensive episode (80/60) while at her outpatient nephrologist's office. On presentation, patient also reported having dizziness and double vision since the morning of 8/21 along with headache. She also had a fall due to the dizziness. BP on presentation 104/67. It appears that patient has chronic double vision and dizziness but symptoms recently worsened. Unclear etiology at this time- possibly worsening of chronic symptoms in the setting of UTI. MRI brain/MRA head and neck unremarkable for acute changes. Plan:  - Stroke pathway  • Continue home regimen of Plavix 75 mg  • Atorvastatin 40 mg  • Goal normotension; avoid hypotension  • Continue telemetry  • PT/OT/ST  • Frequent neuro checks. Continue to monitor and notify neurology with any changes. - Labs pending: blood cultures, urine culture  - Medical management and supportive care per primary team. Correction of any metabolic or infectious disturbances.   - No further inpatient neurology recommendations at this time. Please call with any questions. Results:  - CT head: No acute intracranial abnormality.  - MRI brain w/wo contrast:  1. No MR evidence of acute ischemia. 2. Stable periventricular signal abnormality could relate to demyelinating disease and/or chronic microangiopathic change. No enhancing lesions.  - MRA head and neck:  1. Normal MRA Brain. 2. Unremarkable MR angiogram of the cervical vasculature. - Echo: EF 70%. No regional wall motion abnormality noted.  Bilateral atrium size normal.  - Labs on presentation: WBC 11.34, creatinine 1.84, alk phos 136, TSH 4.082, UA concerning for UTI, , A1C 6.5    Intractable headache  Assessment & Plan  - Unclear etiology at this time, possibly tension headache?  - MRI brain without acute changes  - Patient reports improvement in headache with prior meds given. Will give another dose of magnesium sulfate 2 g IV and Reglan 10 mg IV x 1  - May consider magnesium oxide and riboflavin outpatient for headache management  - Monitor neuro exam; notify with any changes    UTI (urinary tract infection)  Assessment & Plan  - UA concerning for UTI  - Blood cultures and urine culture pending  - Currently on Rocephin  - Medical management per primary team    JOVANA (acute kidney injury) ([address])  Assessment & Plan  - Creatinine on presentation 1.84  - Medical management per primary team    Multiple sclerosis ([address])  Assessment & Plan  - On Aubagio  - Follows with Dr. Alexandro Ornelas outpatient  - See imaging noted above    Essential hypertension  Assessment & Plan  - Presented due to hypotension outpatient with BP 80/60  - Goal normotension; avoid hypotension  - Medical management per primary team       Zahra Long will need follow up in at the next regular appointment with multiple sclerosis attending/AP. She will not require outpatient neurological testing. Patient has follow up scheduled with Dr. Alexandro Ornelas on 9/6/23. Case and treatment plan reviewed with attending neurologist, Dr. Kee Rodriguez. Please see attending attestation for any further recommendations. Subjective:   Patient seen and evaluated at the bedside with attending neurologist. Patient states she still has a pounding headache but is better than before. She has never had a headache like this before. She always has dizziness and double vision. She normally uses a cane to ambulate.         Past Medical History:   Diagnosis Date   • Chronic kidney disease    • Glaucoma    • Hypertension    • Knee arthropathy 2023    Left Knee   • Migraine    • Multiple sclerosis ([facility])    • Night muscle spasms    • Stroke [facility])     2 strokes in past,  and      Past Surgical History:   Procedure Laterality Date   • ESOPHAGOGASTRODUODENOSCOPY N/A 2016    Procedure: ESOPHAGOGASTRODUODENOSCOPY (EGD); Surgeon: Connie Orozco MD;  Location:  GI LAB; Service:    • HIP ARTHROPLASTY Bilateral      Family History   Problem Relation Age of Onset   • No Known Problems Mother    • No Known Problems Father      Social History     Socioeconomic History   • Marital status: /Civil Union     Spouse name: None   • Number of children: None   • Years of education: None   • Highest education level: None   Occupational History   • None   Tobacco Use   • Smoking status: Every Day     Packs/day: 0.25     Years: 10.00     Total pack years: 2.50     Types: Cigarettes     Last attempt to quit: 2021     Years since quittin.6   • Smokeless tobacco: Never   • Tobacco comments:     1 or 2 a week   Vaping Use   • Vaping Use: Never used   Substance and Sexual Activity   • Alcohol use: Yes   • Drug use: No   • Sexual activity: Not Currently     Partners: Male   Other Topics Concern   • None   Social History Narrative   • None     Social Determinants of Health     Financial Resource Strain: Not on file   Food Insecurity: No Food Insecurity (2023)    Hunger Vital Sign    • Worried About Running Out of Food in the Last Year: Never true    • Ran Out of Food in the Last Year: Never true   Transportation Needs: No Transportation Needs (2023)    PRAPARE - Transportation    • Lack of Transportation (Medical): No    • Lack of Transportation (Non-Medical):  No   Physical Activity: Not on file   Stress: Not on file   Social Connections: Not on file   Intimate Partner Violence: Not on file   Housing Stability: Low Risk  (2023)    Housing Stability Vital Sign    • Unable to Pay for Housing in the Last Year: No    • Number of Places Lived in the Last Year: 1    • Unstable Housing in the Last Year: No         Medications: All current active meds have been reviewed and current meds:  Scheduled Meds:  Current Facility-Administered Medications   Medication Dose Route Frequency Provider Last Rate   • atorvastatin  40 mg Oral Daily With Dinner Ila Cruz PA-C     • cefTRIAXone  1,000 mg Intravenous Q24H Ila Cruz PA-C 1,000 mg (08/23/23 1958)   • clopidogrel  75 mg Oral Daily Emilia Howard PA-C     • ferrous sulfate  325 mg Oral Daily With Breakfast Emilia Howard PA-C     • folic acid  1 mg Oral Daily Emilia Howard PA-C     • gabapentin  800 mg Oral 4x Daily An K Long, CRNP     • heparin (porcine)  5,000 Units Subcutaneous Q8H [facility] Emilia Howard PA-C     • hydrALAZINE  75 mg Oral Q8H [facility] Malaika Cohen MD     • iohexol  100 mL Intravenous Once in imaging Ila Cruz PA-C     • losartan  50 mg Oral Daily Renetta Frederick PA-C     • magnesium sulfate  2 g Intravenous Once An K Long, CRNP 2 g (08/24/23 1021)   • melatonin  3 mg Oral HS Emilia Howard PA-C     • metoclopramide  10 mg Intravenous Once An K Long, CRNP     • nicotine  1 patch Transdermal Daily Emilia Howard PA-C     • NIFEdipine  120 mg Oral Daily Renetta Frederick PA-C     • pantoprazole  40 mg Oral Early Morning Emilia Howard PA-C     • sodium chloride  50 mL/hr Intravenous Continuous Emilia Howard PA-C 50 mL/hr (08/23/23 1328)     Continuous Infusions:sodium chloride, 50 mL/hr, Last Rate: 50 mL/hr (08/23/23 1328)      PRN Meds:.•  iohexol       ROS:   A 12 system ROS was completed. Other than the above mentioned complaints in the HPI and those commented on below, all remaining systems were negative. Vitals:   BP (!) 157/101   Pulse 74   Temp 97.7 °F (36.5 °C)   Resp 18   Ht 5' 11" (1.803 m)   Wt 92.2 kg (203 lb 4.2 oz)   SpO2 95%   BMI 28.35 kg/m²       Physical and neurologic exam performed by attending neurologist:   Physical Exam  Vitals and nursing note reviewed.    Constitutional:       General: She is not in acute distress. Appearance: Normal appearance. She is not ill-appearing. HENT:      Head: Normocephalic. Mouth/Throat:      Mouth: Mucous membranes are moist.      Pharynx: Oropharynx is clear. Eyes:      General: No scleral icterus. Right eye: No discharge. Left eye: No discharge. Conjunctiva/sclera: Conjunctivae normal.   Cardiovascular:      Rate and Rhythm: Normal rate. Pulmonary:      Effort: Pulmonary effort is normal. No respiratory distress. Musculoskeletal:         General: Normal range of motion. Cervical back: Normal range of motion. Skin:     General: Skin is warm and dry. Coloration: Skin is not jaundiced or pale. Neurological:      Mental Status: She is alert and oriented to person, place, and time. Cranial Nerves: Cranial nerves 2-12 are intact. Coordination: Finger-Nose-Finger Test normal.   Psychiatric:         Mood and Affect: Mood normal.         Behavior: Behavior normal.       Neurologic Exam     Mental Status   Oriented to person, place, and time. Level of consciousness: alert  Able to follow commands appropriately. No aphasia or dysarthria noted. Cranial Nerves   Cranial nerves II through XII intact. Except for: R exotropia, R eye adduction palsy     Motor Exam   Muscle bulk: normal  Full strength throughout bilateral UE/LE except for:  L UE proximal 4-4+/5  L LE proximal 3/5     Gait, Coordination, and Reflexes     Coordination   Finger to nose coordination: normal    Tremor   Resting tremor: absent  Intention tremor: absent      Labs: I have personally reviewed pertinent reports.    Recent Results (from the past 24 hour(s))   CBC and differential    Collection Time: 08/24/23  4:51 AM   Result Value Ref Range    WBC 7.07 4.31 - 10.16 Thousand/uL    RBC 3.82 3.81 - 5.12 Million/uL    Hemoglobin 11.1 (L) 11.5 - 15.4 g/dL    Hematocrit 35.1 34.8 - 46.1 %    MCV 92 82 - 98 fL    MCH 29.1 26.8 - 34.3 pg    MCHC 31.6 31.4 - 37.4 g/dL RDW 14.4 11.6 - 15.1 %    MPV 11.2 8.9 - 12.7 fL    Platelets 274 399 - 021 Thousands/uL    nRBC 0 /100 WBCs    Neutrophils Relative 57 43 - 75 %    Immat GRANS % 0 0 - 2 %    Lymphocytes Relative 26 14 - 44 %    Monocytes Relative 8 4 - 12 %    Eosinophils Relative 9 (H) 0 - 6 %    Basophils Relative 0 0 - 1 %    Neutrophils Absolute 4.00 1.85 - 7.62 Thousands/µL    Immature Grans Absolute 0.02 0.00 - 0.20 Thousand/uL    Lymphocytes Absolute 1.82 0.60 - 4.47 Thousands/µL    Monocytes Absolute 0.55 0.17 - 1.22 Thousand/µL    Eosinophils Absolute 0.65 (H) 0.00 - 0.61 Thousand/µL    Basophils Absolute 0.03 0.00 - 0.10 Thousands/µL   Basic metabolic panel    Collection Time: 08/24/23  4:51 AM   Result Value Ref Range    Sodium 138 135 - 147 mmol/L    Potassium 4.1 3.5 - 5.3 mmol/L    Chloride 109 (H) 96 - 108 mmol/L    CO2 24 21 - 32 mmol/L    ANION GAP 5 mmol/L    BUN 20 5 - 25 mg/dL    Creatinine 1.11 0.60 - 1.30 mg/dL    Glucose 91 65 - 140 mg/dL    Calcium 9.2 8.4 - 10.2 mg/dL    eGFR 52 ml/min/1.73sq m   Magnesium    Collection Time: 08/24/23  4:51 AM   Result Value Ref Range    Magnesium 2.3 1.9 - 2.7 mg/dL       Imaging: I have personally reviewed pertinent imaging in PACS, and I have personally reviewed PACS reports. EKG, Pathology, and Other Studies: I have personally reviewed pertinent reports. VTE Prophylaxis: Sequential compression device (Venodyne)  and Heparin        Total time spent today 32 minutes. Greater than 50% of total time was spent with the patient and / or family counseling and / or coordination of care. A description of the counseling / coordination of care: Patient seen and evaluated. Case reviewed with attending neurologist. Chart thoroughly reviewed including imaging and labs. Coordination of care with primary team. Discussion of imaging, medications, and follow up with patient.

## 2023-08-24 NOTE — UTILIZATION REVIEW
NOTIFICATION OF INPATIENT ADMISSION   AUTHORIZATION REQUEST   SERVICING FACILITY:   [facility] [address]  Tax ID: [id number]  NPI: [id number] ATTENDING PROVIDER:  Attending Name and NPI#: Romain Day Md [[id number]]  Address: Darrell Peralta, [address] [place] [address]  Phone: [phone]     ADMISSION INFORMATION:  Place of Service: Inpatient [address] [facility]  Place of Service Code: 21  Inpatient Admission Date/Time: 8/23/23 12:32 PM  Discharge Date/Time: No discharge date for patient encounter. Admitting Diagnosis Code/Description:  Emphysematous cystitis [N30.80]  Dizziness [R42]  Hypotension [I95.9]  Acute on chronic renal insufficiency [N28.9, N18.9]  Hypotensive episode [I95.9]  Stroke-like symptoms [R29.90]  Fall, initial encounter [W19. XXXA]  Double vision with both eyes open [H53.2]  Headache [R51.9]     UTILIZATION REVIEW CONTACT:  Rusty Stallings Utilization   Network Utilization Review Department  Phone: [phone]  Fax [phone]  Email: [email or web address]  Contact for approvals/pending authorizations, clinical reviews, and discharge. PHYSICIAN ADVISORY SERVICES:  Medical Necessity Denial & [id number] Review  Phone: [phone]  Fax: [phone]  Email: [email or web address]

## 2023-08-24 NOTE — ASSESSMENT & PLAN NOTE
- Unclear etiology at this time, possibly tension headache?  - MRI brain without acute changes  - Patient reports improvement in headache with prior meds given.  Will give another dose of magnesium sulfate 2 g IV and Reglan 10 mg IV x 1  - May consider magnesium oxide and riboflavin outpatient for headache management  - Monitor neuro exam; notify with any changes

## 2023-08-24 NOTE — ASSESSMENT & PLAN NOTE
BP (!) 157/101   Pulse 74   Temp 97.7 °F (36.5 °C)   Resp 18   Ht 5' 11" (1.803 m)   Wt 92.2 kg (203 lb 4.2 oz)   SpO2 95%   BMI 28.35 kg/m²   · Achieve normotension, avoid hypotension  · Increase hydralazine to 75mg PO TID from 50mg  · Cont losartan, nifedipine, hydralazine

## 2023-08-24 NOTE — CASE MANAGEMENT
Case Management Discharge Planning Note    Patient name Allyssa Keller  Location [address] [facility] [place] 212/-01 MRN [id number]  : 1960 Date 2023       Current Admission Date: 2023  Current Admission Diagnosis:Stroke-like symptoms   Patient Active Problem List    Diagnosis Date Noted   • Intractable headache 2023   • UTI (urinary tract infection) 2023   • Fall 2023   • Cervical spondylosis 2023   • Cervical radiculopathy 2023   • Hypercalcemia 2023   • Stage 3 chronic kidney disease ([address]) 2021   • Chronic kidney disease-mineral and bone disorder 2021   • JOVANA (acute kidney injury) ([address]) 2021   • Lumbar radiculopathy 2020   • Multiple sclerosis ([address]) 2017   • Bradycardia 2016   • Stroke ([address]) 2016   • Essential hypertension 2016   • Abdominal pain 2016   • Nausea and/or vomiting 2016   • Syncope 2016   • Stroke-like symptoms 2016   • Adrenal mass, right ([address]) 10/06/2015   • Cardiomegaly 2015      LOS (days): 1  Geometric Mean LOS (GMLOS) (days):   Days to GMLOS:     OBJECTIVE:  Risk of Unplanned Readmission Score: 10.89         Current admission status: Inpatient   Preferred Pharmacy:   [facility] [address], [address]  [address] [place] [address]  Phone: [phone] Fax: [phone], [address]  [address]  Phone: [phone] Fax: [phone]    Primary Care Provider: Obinna Landeros MD    Primary Insurance: Светлана Angela MA [facility]  Secondary Insurance:     DISCHARGE DETAILS:    Discharge planning discussed with[de-identified] Patient  Freedom of Choice: Yes  Comments - Freedom of Choice: CM attempted to meet with patient at bedside to review DC plan and IMM. Patient reported that she is on the commode at this time and asked CM to come back.   CM contacted family/caregiver?: No- see comments (Patient declined.)  Were Treatment Team discharge recommendations reviewed with patient/caregiver?: Yes  Did patient/caregiver verbalize understanding of patient care needs?: Yes  Were patient/caregiver advised of the risks associated with not following Treatment Team discharge recommendations?: Yes    [address]         Is the patient interested in [address] at discharge?: No    DME Referral Provided  Referral made for DME?: No    Other Referral/Resources/Interventions Provided:  Interventions: None Indicated    Would you like to participate in our [address] [address] service program?  : No - Declined    Treatment Team Recommendation: Short Term Rehab  Discharge Destination Plan[de-identified] Home  Transport at Discharge : Family    IMM Given (Date):: 08/24/23  IMM Given to[de-identified] Patient (CM left a copy of the IMM at patient's bedside. Patient agreeable to review it.  IMM placed in medical records.)

## 2023-08-24 NOTE — DISCHARGE SUMMARY
[address]  Discharge- Sintia Tello 1960, 58 y.o. female MRN: [id number]  Unit/Bed#: -Alejandro Encounter: [id number]  Primary Care Provider: Dylan Landry MD   Date and time admitted to hospital: 8/21/2023  3:29 PM    * Stroke-like symptoms  Assessment & Plan  · Presented with dizziness and double vision  · CT head wo contrast: no acute abnormality  · On stroke pathway  · MRI: no CVA, MRA no abnormality  · Echo reviewed shows concentric hypertrophy severe to moderate 70% hyperdynamic  · Allergy to ASA noted, cont statin and plavix  · Tele monitoring  · Neurology consult    Intractable headache  Assessment & Plan  · Improved, appears to be tension headache. Unable to take tylenol due to allergy. Can take nsaids. No history of migraines. Giving mag ox at DC for headaces. Will need OP neuro followup  · Patient reacted well to low-dose of Reglan and magnesium yesterday  · discussed with neurology, repeat dose ordered  · MRI no CVA    Fall  Assessment & Plan  · Reports fall today due to dizziness  · Fall precautions  · PT/OT evaluation  · TSh normal  · Check B12- 386, low normal, will benefit from supplementation.  initiate    UTI (urinary tract infection)  Assessment & Plan  · UA negative for nitrites and trace leuks, innumerable bacteria  · Will treat even though currently has no dysuria as pt presented with dizziness, which can be caused by infection    JOVANA (acute kidney injury) ([address])  Assessment & Plan  · Resolved now  · Creatinine elevated 1.84 on admission (baseline around 1.5 per care everywhere)  · DC fluid if taking PO  · Monitor creatinine    Multiple sclerosis (HCC)  Assessment & Plan  · Continue teriflunomide  · Will bring from home if needed  · MRI  Brain shows stable demyelinating changes  · Outpatient Neurology follow up    Essential hypertension  Assessment & Plan  BP (!) 157/101   Pulse 74   Temp 97.7 °F (36.5 °C)   Resp 18   Ht 5' 11" (1.803 m)   Wt 92.2 kg (203 lb 4.2 oz)   SpO2 95%   BMI 28.35 kg/m²   · Achieve normotension, avoid hypotension  · Increase hydralazine to 75mg PO TID from 50mg  · Cont losartan, nifedipine, hydralazine        Medical Problems     Resolved Problems  Date Reviewed: 8/24/2023   None       Discharging Physician / Practitioner: Alden Curry MD  PCP: Ivonne Gray MD  Admission Date:   Admission Orders (From admission, onward)     Ordered        08/23/23 1232  Inpatient Admission  Once            08/21/23 2029  Place in Observation  Once                      Discharge Date: 08/24/23    Consultations During Hospital Stay:  · IP CONSULT TO NEUROLOGY    Procedures Performed:   · MRI BRAIN  · MRA CAROTIDS WO AND W CONTRAST  · MRA HEAD WO CONTRAST    Significant Findings / Test Results:   · NO CVA    Incidental Findings:   · none    Test Results Pending at Discharge (will require follow up):   · none     Outpatient Tests Requested:  · none    Complications:  none    Reason for Admission: Strokelike symptoms    Hospital Course:   Johnathan Knowles is a 58 y.o. female patient who originally presented to the hospital on 8/21/2023 due to strokelike symptoms. Patient underwent neurology work-up. Patient has a history of MS. MRIs were done as mentioned above. No stroke. Patient also had underlying headaches which improved with Reglan and magnesium cocktail. Patient has no history of migraines. This appears to be like tension headaches. Patient unable to take Tylenol or ibuprofen secondary to Tylenol allergy and CKD. Increasing hydralazine to 75 mg at discharge. Cleared by neurology for discharge with outpatient follow-up    Please see above list of diagnoses and related plan for additional information.      Condition at Discharge: stable    Discharge Day Visit / Exam:   Subjective:  " I am feeling much better"  Vitals: Blood Pressure: (!) 157/101 (08/24/23 0748)  Pulse: 74 (08/24/23 0748)  Temperature: 97.7 °F (36.5 °C) (08/24/23 0748)  Temp Source: Oral (08/24/23 0257)  Respirations: 18 (08/24/23 0257)  Height: 5' 11" (180.3 cm) (08/22/23 1150)  Weight - Scale: 92.2 kg (203 lb 4.2 oz) (08/24/23 0600)  SpO2: 95 % (08/24/23 0748)  Exam:   Physical Exam General- Awake, alert and oriented x 3, looks comfortable  HEENT- Normocephalic, atraumatic, oral mucosa- moist  Neck- Supple, No carotid bruit, no JVD  CVS- Normal S1/ S2, Regular rate and rhythm, No murmur, No edema  Respiratory system- B/L clear breath sounds, no wheezing  Abdomen- Soft, Non distended, no tenderness, Bowel sound- present 4 quads  Genitourinary- No suprapubic tenderness, No CVA tenderness  Skin- No new bruise or rash  Musculoskeletal- No gross deformity  Psych- No acute psychosis  CNS- CN II- XII grossly intact, No acute focal neurologic deficit noted      Discussion with Family: Patient declined call to . Discharge instructions/Information to patient and family:   See after visit summary for information provided to patient and family. Provisions for Follow-Up Care:  See after visit summary for information related to follow-up care and any pertinent home health orders. Disposition:   Home    Planned Readmission: no     Discharge Statement:  I spent 45 minutes discharging the patient. This time was spent on the day of discharge. I had direct contact with the patient on the day of discharge. Greater than 50% of the total time was spent examining patient, answering all patient questions, arranging and discussing plan of care with patient as well as directly providing post-discharge instructions. Additional time then spent on discharge activities. Discharge Medications:  See after visit summary for reconciled discharge medications provided to patient and/or family.       **Please Note: This note may have been constructed using a voice recognition system**

## 2023-08-24 NOTE — DISCHARGE INSTR - AVS FIRST PAGE
No stroke on MRI  Take magnesium oxide daily for headache  You have been cleared for PT OT   Follow up with your neurologist  PCP in 1 week

## 2023-08-24 NOTE — TELEPHONE ENCOUNTER
Good Afternoon,    Dr. Atkinson Spikes patient stopped by the office so say thank you very much for everything.

## 2023-08-25 NOTE — UTILIZATION REVIEW
NOTIFICATION OF ADMISSION DISCHARGE   This is a Notification of Discharge from [facility]. Please be advised that this patient has been discharge from our facility. Below you will find the admission and discharge date and time including the patient’s disposition. UTILIZATION REVIEW CONTACT:  Jaida Patel  Utilization   Network Utilization Review Department  Phone: [phone] x carefully listen to the prompts. All voicemails are confidential.  Email: [email or web address]     ADMISSION INFORMATION  PRESENTATION DATE: 8/21/2023  3:29 PM  OBERVATION ADMISSION DATE: 08/21/23  INPATIENT ADMISSION DATE: 8/23/23 12:32 PM   DISCHARGE DATE: 8/24/2023  5:06 PM   DISPOSITION:Home/Self Care    IMPORTANT INFORMATION:  Send all requests for admission clinical reviews, approved or denied determinations and any other requests to dedicated fax number below belonging to the campus where the patient is receiving treatment.  List of dedicated fax numbers:  [place] DENIALS (Administrative/Medical Necessity) [phone] [address] [facility] (Maternity/NICU/Pediatrics) [phone]   [facility] [phone]   [facility] [phone] [address] [phone] 401 [facility] [phone]   [facility] [phone]   [address] [place] [address] 608 [facility] [phone] 506 [facility] [phone] [address] [facility] [phone] 27[address] [facility] [address] [phone]

## 2023-08-27 LAB
BACTERIA BLD CULT: NORMAL
BACTERIA BLD CULT: NORMAL

## 2023-09-06 ENCOUNTER — TELEPHONE (OUTPATIENT)
Dept: NEUROLOGY | Facility: CLINIC | Age: 63
End: 2023-09-06

## 2023-09-06 ENCOUNTER — OFFICE VISIT (OUTPATIENT)
Dept: NEUROLOGY | Facility: CLINIC | Age: 63
End: 2023-09-06
Payer: COMMERCIAL

## 2023-09-06 VITALS — SYSTOLIC BLOOD PRESSURE: 100 MMHG | DIASTOLIC BLOOD PRESSURE: 60 MMHG | HEART RATE: 91 BPM

## 2023-09-06 DIAGNOSIS — G35 MULTIPLE SCLEROSIS (HCC): Primary | ICD-10-CM

## 2023-09-06 DIAGNOSIS — H81.13 BENIGN PAROXYSMAL POSITIONAL VERTIGO DUE TO BILATERAL VESTIBULAR DISORDER: ICD-10-CM

## 2023-09-06 DIAGNOSIS — I67.1 ANEURYSM, CEREBRAL: ICD-10-CM

## 2023-09-06 PROCEDURE — 99214 OFFICE O/P EST MOD 30 MIN: CPT | Performed by: PSYCHIATRY & NEUROLOGY

## 2023-09-06 RX ORDER — MECLIZINE HYDROCHLORIDE 25 MG/1
25 TABLET ORAL 3 TIMES DAILY PRN
Qty: 30 TABLET | Refills: 0 | Status: SHIPPED | OUTPATIENT
Start: 2023-09-06 | End: 2023-09-11

## 2023-09-06 RX ORDER — MECLIZINE HYDROCHLORIDE 25 MG/1
25 TABLET ORAL 3 TIMES DAILY PRN
Qty: 30 TABLET | Refills: 0 | Status: SHIPPED | OUTPATIENT
Start: 2023-09-06 | End: 2023-09-06 | Stop reason: SDUPTHER

## 2023-09-06 RX ORDER — RENAGEL 800 MG/1
14 TABLET ORAL DAILY
Qty: 30 TABLET | Refills: 6 | Status: SHIPPED | OUTPATIENT
Start: 2023-09-06

## 2023-09-06 RX ORDER — ROSUVASTATIN CALCIUM 40 MG/1
TABLET, COATED ORAL
COMMUNITY
Start: 2023-08-29

## 2023-09-06 NOTE — PROGRESS NOTES
Zahra Long is a 58 y.o. female returns for update history of MS and dizziness    Assessment:  1. Multiple sclerosis ([address])    2. Benign paroxysmal positional vertigo due to bilateral vestibular disorder        Plan:  Trial of Antivert  Vestibular therapy  Continue Aubagio  Follow-up 6 months    Discussion:  Juliann Villanueva denies any new MS type symptoms. She was admitted to the hospital last May with some increasing left-sided weakness however MRI failed to demonstrate findings consistent with stroke or MS relapse. She remains on Plavix and statin therapy. She also remains on Aubagio. In that time she was found to have a small 2 mm aneurysm and follow-up imaging will be ordered at the time of her next follow-up. She has been having issues with positional vertigo with positive Hallpike maneuver bilaterally. I have prescribed Antivert and ordered vestibular therapy. I will see her back in follow-up in 6 months      Subjective:    TONE Villanueva returns in follow-up today. She denies any new MS type symptoms in the recent past.  She was admitted this past May for some left-sided weakness as well as chest pain. Work-up was negative for stroke or changes consistent with acute MS attack and her symptoms resolved. She remains on Plavix and statin therapy. She also continues to take Aubagio although she states she ran out of it a couple of weeks ago and is requesting a refill. Her main concern today is dizziness. She states every time she gets up the room starts to spin. She states she was given a new pair of prism lenses but it has not improved her double vision. She is going back to see her ophthalmologist in the near future. She also finds that when she tries to walk she gets short of breath and anticipates discussing this with her primary doctor in the near future.   During her evaluation in the hospital she had a CTA done which demonstrated a possible small 2 mm aneurysm in the carotid distribution      Past Medical History:   Diagnosis Date   • Chronic kidney disease    • Glaucoma    • Hypertension    • Knee arthropathy 2023    Left Knee   • Migraine    • Multiple sclerosis (HCC)    • Night muscle spasms    • Stroke ([address])     2 strokes in past, 2011 and 2013       Family History:  Family History   Problem Relation Age of Onset   • No Known Problems Mother    • No Known Problems Father        Past Surgical History:  Past Surgical History:   Procedure Laterality Date   • ESOPHAGOGASTRODUODENOSCOPY N/A 8/9/2016    Procedure: ESOPHAGOGASTRODUODENOSCOPY (EGD); Surgeon: Chon Esparza MD;  Location: [facility]; Service:    • HIP ARTHROPLASTY Bilateral        Social History:   reports that she has been smoking cigarettes. She has a 2.50 pack-year smoking history. She has never used smokeless tobacco. She reports current alcohol use. She reports that she does not use drugs.     Allergies:  Acetaminophen, Aspirin, and Lisinopril      Current Outpatient Medications:   •  atorvastatin (LIPITOR) 40 mg tablet, , Disp: , Rfl:   •  Aubagio 14 MG TABS, Take 1 tablet (14 mg total) by mouth in the morning, Disp: 30 tablet, Rfl: 6  •  Cholecalciferol 125 MCG (5000 UT) capsule, 5,000 Units, Disp: , Rfl:   •  clopidogrel (PLAVIX) 75 mg tablet, Take 75 mg by mouth daily, Disp: , Rfl:   •  cyclobenzaprine (FLEXERIL) 10 mg tablet, Take 1 tablet (10 mg total) by mouth 3 (three) times a day as needed for muscle spasms, Disp: 60 tablet, Rfl: 0  •  ferrous sulfate 325 (65 Fe) mg tablet, Take 65 mg by mouth, Disp: , Rfl:   •  folic acid (FOLVITE) 1 mg tablet, , Disp: , Rfl:   •  gabapentin (NEURONTIN) 600 MG tablet, 800 mg 4 (four) times a day, Disp: , Rfl:   •  hydrALAZINE (APRESOLINE) 50 mg tablet, Take 50 mg by mouth Three times a day, Disp: , Rfl:   •  losartan (COZAAR) 50 mg tablet, Take one tablet by mouth once a day, Disp: , Rfl:   •  Magnesium Oxide 400 MG CAPS, Take 1 tablet (400 mg total) by mouth in the morning, Disp: 30 capsule, Rfl: 0  • meclizine (ANTIVERT) 25 mg tablet, Take 1 tablet (25 mg total) by mouth 3 (three) times a day as needed for dizziness, Disp: 30 tablet, Rfl: 0  •  meloxicam (MOBIC) 15 mg tablet, , Disp: , Rfl:   •  NIFEdipine ER (ADALAT CC) 60 MG 24 hr tablet, TAKE TWO TABLETS BY MOUTH EVERY DAY, Disp: , Rfl:   •  nitroglycerin (NITROSTAT) 0.4 mg SL tablet, PRN, Disp: , Rfl:   •  omeprazole (PriLOSEC) 20 mg delayed release capsule, omeprazole 20 mg capsule,delayed release, Disp: , Rfl:   •  melatonin 3 mg, Take 3 mg by mouth (Patient not taking: Reported on 9/6/2023), Disp: , Rfl:   •  rosuvastatin (CRESTOR) 40 MG tablet, , Disp: , Rfl:     I have reviewed the past medical, social and family history, current medications, allergies, vitals, review of systems and updated this information as appropriate today     Objective:    Vitals:  Blood pressure 100/60, pulse 91. Physical Exam    Neurological Exam  GENERAL: Well-developed well-nourished woman in no acute distress  HEENT/NECK: Head is atraumatic normocephalic, neck is supple  NEUROLOGIC:  Mental Status: Awake and alert without aphasia  Cranial Nerves: Extraocular movements demonstrated a right exotropia. Face is symmetrical  Coordination: Gait is stable with a straight cane  Hallpike maneuver produced nystagmus with head down to either side    ROS:    Review of Systems   Constitutional: Negative for appetite change, fatigue and fever. HENT: Negative. Negative for hearing loss, tinnitus, trouble swallowing and voice change. Eyes: Negative. Negative for photophobia, pain and visual disturbance. Respiratory: Negative. Negative for shortness of breath. Cardiovascular: Negative. Negative for palpitations. Gastrointestinal: Negative. Negative for nausea and vomiting. Endocrine: Negative. Negative for cold intolerance. Genitourinary: Negative. Negative for dysuria, frequency and urgency.    Musculoskeletal: Negative for back pain, gait problem, myalgias and neck pain.   Skin: Negative. Negative for rash. Allergic/Immunologic: Negative. Neurological: Negative. Negative for dizziness, tremors, seizures, syncope, facial asymmetry, speech difficulty, weakness, light-headedness, numbness and headaches. Hematological: Negative. Does not bruise/bleed easily. Psychiatric/Behavioral: Negative. Negative for confusion, hallucinations and sleep disturbance.

## 2023-09-06 NOTE — TELEPHONE ENCOUNTER
Patient would like medication Meclizine be sent to [facility] at [facility], [facility], [facility] [address] [facility]. Patient stated [facility] is not her pharmacy. She is currently in the waiting room waiting for confirmation.

## 2023-09-11 DIAGNOSIS — H81.13 BENIGN PAROXYSMAL POSITIONAL VERTIGO DUE TO BILATERAL VESTIBULAR DISORDER: ICD-10-CM

## 2023-09-11 RX ORDER — MECLIZINE HYDROCHLORIDE 25 MG/1
TABLET ORAL
Qty: 30 TABLET | Refills: 0 | Status: SHIPPED | OUTPATIENT
Start: 2023-09-11

## 2023-10-09 DIAGNOSIS — H81.13 BENIGN PAROXYSMAL POSITIONAL VERTIGO DUE TO BILATERAL VESTIBULAR DISORDER: ICD-10-CM

## 2023-10-09 RX ORDER — MECLIZINE HYDROCHLORIDE 25 MG/1
TABLET ORAL
Qty: 30 TABLET | Refills: 0 | Status: SHIPPED | OUTPATIENT
Start: 2023-10-09 | End: 2023-10-19

## 2023-10-19 DIAGNOSIS — H81.13 BENIGN PAROXYSMAL POSITIONAL VERTIGO DUE TO BILATERAL VESTIBULAR DISORDER: ICD-10-CM

## 2023-10-19 RX ORDER — MECLIZINE HYDROCHLORIDE 25 MG/1
TABLET ORAL
Qty: 30 TABLET | Refills: 0 | Status: SHIPPED | OUTPATIENT
Start: 2023-10-19

## 2023-10-20 PROBLEM — N39.0 UTI (URINARY TRACT INFECTION): Status: RESOLVED | Noted: 2023-08-21 | Resolved: 2023-10-20

## 2023-10-31 DIAGNOSIS — H81.13 BENIGN PAROXYSMAL POSITIONAL VERTIGO DUE TO BILATERAL VESTIBULAR DISORDER: ICD-10-CM

## 2023-10-31 RX ORDER — MECLIZINE HYDROCHLORIDE 25 MG/1
TABLET ORAL
Qty: 30 TABLET | Refills: 3 | Status: SHIPPED | OUTPATIENT
Start: 2023-10-31

## 2023-11-24 ENCOUNTER — TELEPHONE (OUTPATIENT)
Age: 63
End: 2023-11-24

## 2023-11-24 NOTE — TELEPHONE ENCOUNTER
Caller: Usama Jain    Doctor: dr Andrea Waterman     Reason for call: Please schedule pt for an OVS with dr Andrea Waterman on 12/08 at 11:45.      Call back#: [phone]

## 2023-11-29 ENCOUNTER — TELEPHONE (OUTPATIENT)
Dept: NEUROLOGY | Facility: CLINIC | Age: 63
End: 2023-11-29

## 2023-11-29 NOTE — TELEPHONE ENCOUNTER
Patient is here now stating she was referred by her primary doctor, today, to schedule an earlier appointment with you due to recent findings of a nodule on her kidney and dizziness and headaches she's suffering with for approximately 1 - 1.5 month. Is she able to be seen today in the open slots for 2 and 2:20? Please let us know as patient is currently here in the office.

## 2023-11-30 NOTE — TELEPHONE ENCOUNTER
Pt left a VM requesting a call back. Called pt back. She is aware of the new appointment time, and it works well for her.

## 2023-11-30 NOTE — TELEPHONE ENCOUNTER
Per provider request, message left for patient to notify of appt time change. Requested call back if the new time of 8:20am did not work.

## 2023-12-04 NOTE — TELEPHONE ENCOUNTER
Called and LVM for patient regarding confirmation for upcoming 12/06 appt w/ Dr. Gricelda Jimenez. Provided patient w/ appt time, date, and location.

## 2023-12-06 ENCOUNTER — OFFICE VISIT (OUTPATIENT)
Dept: NEUROLOGY | Facility: CLINIC | Age: 63
End: 2023-12-06
Payer: COMMERCIAL

## 2023-12-06 VITALS
DIASTOLIC BLOOD PRESSURE: 88 MMHG | SYSTOLIC BLOOD PRESSURE: 120 MMHG | WEIGHT: 204 LBS | HEIGHT: 71 IN | HEART RATE: 88 BPM | BODY MASS INDEX: 28.56 KG/M2

## 2023-12-06 DIAGNOSIS — R55 SYNCOPE: Primary | ICD-10-CM

## 2023-12-06 DIAGNOSIS — F40.240 CLAUSTROPHOBIA: ICD-10-CM

## 2023-12-06 DIAGNOSIS — H81.13 BENIGN PAROXYSMAL POSITIONAL VERTIGO DUE TO BILATERAL VESTIBULAR DISORDER: ICD-10-CM

## 2023-12-06 DIAGNOSIS — G35 MULTIPLE SCLEROSIS (HCC): ICD-10-CM

## 2023-12-06 DIAGNOSIS — R42 DIZZINESS AND GIDDINESS: ICD-10-CM

## 2023-12-06 PROCEDURE — 99214 OFFICE O/P EST MOD 30 MIN: CPT | Performed by: PSYCHIATRY & NEUROLOGY

## 2023-12-06 RX ORDER — LORAZEPAM 1 MG/1
TABLET ORAL
Qty: 3 TABLET | Refills: 0 | Status: SHIPPED | OUTPATIENT
Start: 2023-12-06

## 2023-12-06 NOTE — PROGRESS NOTES
Venecia Monroe is a 58 y.o. female with a history of MS who presents today with a syncopal episode    Assessment:  1. Syncope    2. Multiple sclerosis ([address])    3. Benign paroxysmal positional vertigo due to bilateral vestibular disorder    4. Claustrophobia    5. Dizziness and giddiness        Plan:  MRI brain with and without contrast  Keep follow-up appointment in March    Discussion:  Leroy Ochoa reports a syncopal episode couple of weeks ago which sounds more like orthostatic hypotension. She had a CAT scan of her head done and EEG which were unremarkable. She continues to report symptoms of dizziness will obtain brain MRI. If her symptoms persist would recommend following up with primary doctor or cardiology as she may have more of an orthostatic hypotension. I will see her back in follow-up at her scheduled appointment in March      Subjective:    HPI  Leroy Ochoa returns in follow-up today. She reports that since she was here last in September she has been going to vestibular therapy. She states that has helped to reduce her dizziness but still has some dizziness. She takes Antivert for relief. She states that around Thanksgiving she had a different episode. She states she had to go to the bathroom. She states she went to the bathroom and then got up and was going to her bedroom. She states she became very lightheaded and subsequently passed out. Her roommate called 911 and she was taken to [facility]. She had various CAT scans done including a CAT scan of her head which was unremarkable. She also had an EEG done that was normal.  She was recently seen by her cardiologist and her primary doctor. She states that she continues to have episodes of lightheadedness when she stands up. She has not had any further episodes of syncope. She denies any tongue biting or incontinence associated with her syncope that occurred a few weeks ago.   She states when she passed out she did bump her head on the left side and her shoulder. During her recent hospitalization in August she had an MRA of her head and neck done which was unremarkable for significant stenosis      Past Medical History:   Diagnosis Date    Chronic kidney disease     Glaucoma     Hypertension     Knee arthropathy 2023    Left Knee    Migraine     Multiple sclerosis (HCC)     Night muscle spasms     Stroke ([address])     2 strokes in past, 2011 and 2013       Family History:  Family History   Problem Relation Age of Onset    No Known Problems Mother     No Known Problems Father        Past Surgical History:  Past Surgical History:   Procedure Laterality Date    ESOPHAGOGASTRODUODENOSCOPY N/A 8/9/2016    Procedure: ESOPHAGOGASTRODUODENOSCOPY (EGD); Surgeon: Jonathan Flores MD;  Location: [facility]; Service:     HIP ARTHROPLASTY Bilateral        Social History:   reports that she has been smoking cigarettes. She has a 2.50 pack-year smoking history. She has never used smokeless tobacco. She reports current alcohol use. She reports that she does not use drugs.     Allergies:  Acetaminophen, Aspirin, Ibuprofen, and Lisinopril      Current Outpatient Medications:     atorvastatin (LIPITOR) 40 mg tablet, Take 40 mg by mouth daily, Disp: , Rfl:     Aubagio 14 MG TABS, Take 1 tablet (14 mg total) by mouth in the morning, Disp: 30 tablet, Rfl: 6    Cholecalciferol 125 MCG (5000 UT) capsule, Take 5,000 Units by mouth daily, Disp: , Rfl:     clopidogrel (PLAVIX) 75 mg tablet, Take 75 mg by mouth daily, Disp: , Rfl:     cyclobenzaprine (FLEXERIL) 10 mg tablet, Take 1 tablet (10 mg total) by mouth 3 (three) times a day as needed for muscle spasms, Disp: 60 tablet, Rfl: 0    ferrous sulfate 325 (65 Fe) mg tablet, Take 65 mg by mouth in the morning, Disp: , Rfl:     folic acid (FOLVITE) 1 mg tablet, Take 1 mg by mouth daily, Disp: , Rfl:     gabapentin (NEURONTIN) 800 mg tablet, Take 800 mg by mouth 4 (four) times a day, Disp: , Rfl:     hydrALAZINE (APRESOLINE) 50 mg tablet, Take 50 mg by mouth Three times a day, Disp: , Rfl:     LORazepam (ATIVAN) 1 mg tablet, Two p.o. 1 hour before MRI, may repeat 1 after 1 hour p.r.n., Disp: 3 tablet, Rfl: 0    losartan (COZAAR) 50 mg tablet, Take one tablet by mouth once a day, Disp: , Rfl:     Magnesium Oxide 400 MG CAPS, Take 1 tablet (400 mg total) by mouth in the morning, Disp: 30 capsule, Rfl: 0    meclizine (ANTIVERT) 25 mg tablet, TAKE ONE TABLET BY MOUTH THREE TIMES DAILY AS NEEDED for dizziness, Disp: 30 tablet, Rfl: 3    melatonin 3 mg, Take 3 mg by mouth daily at bedtime, Disp: , Rfl:     NIFEdipine ER (ADALAT CC) 60 MG 24 hr tablet, TAKE TWO TABLETS BY MOUTH EVERY DAY, Disp: , Rfl:     nitroglycerin (NITROSTAT) 0.4 mg SL tablet, PRN, Disp: , Rfl:     omeprazole (PriLOSEC) 20 mg delayed release capsule, omeprazole 20 mg capsule,delayed release, Disp: , Rfl:     rosuvastatin (CRESTOR) 40 MG tablet, Take 40 mg by mouth daily, Disp: , Rfl:     meloxicam (MOBIC) 15 mg tablet, , Disp: , Rfl:     I have reviewed the past medical, social and family history, current medications, allergies, vitals, review of systems and updated this information as appropriate today     Objective:    Vitals:  Blood pressure 120/88, pulse 88, height 5' 11" (1.803 m), weight 92.5 kg (204 lb). Physical Exam    Neurological Exam  GENERAL: Well-developed well-nourished woman in no acute distress  HEENT/NECK: Head is atraumatic normocephalic, neck is supple  CARDIOVASCULAR: No carotid bruit  NEUROLOGIC:  Mental Status: Awake and alert without aphasia  Cranial Nerves: Extraocular movements are full. Face is symmetrical  Motor: No drift is noted on arm extension  Coordination: Finger-to-nose testing is performed accurately. Romberg is negative. She ambulates with an antalgic gait pattern favoring the right leg utilizing a straight cane  Reflexes: Hypoactive throughout      ROS:    Review of Systems   Constitutional: Negative. HENT: Negative.      Eyes: Positive for visual disturbance. Respiratory: Negative. Cardiovascular: Negative. Gastrointestinal: Negative. Endocrine: Negative. Genitourinary: Negative. Musculoskeletal: Negative. Skin: Negative. Allergic/Immunologic: Negative. Neurological: Negative. Hematological: Negative. Psychiatric/Behavioral: Negative.

## 2023-12-07 NOTE — H&P (VIEW-ONLY)
Pain Medicine Follow-Up Note    Assessment:  1. Cervical spondylosis    2. Cervical radiculopathy    3. Myofascial pain syndrome        Plan:  Orders Placed This Encounter   Procedures    Durable Medical Equipment     1 PNEUMATIC CERVICAL TRACTION COLLAR       New Medications Ordered This Visit   Medications    traMADol (Ultram) 50 mg tablet     Sig: Take 1 tablet (50 mg total) by mouth 2 (two) times a day as needed for severe pain     Dispense:  45 tablet     Refill:  0    methocarbamol (ROBAXIN) 500 mg tablet     Sig: Take 1 tablet (500 mg total) by mouth 2 (two) times a day as needed for muscle spasms     Dispense:  30 tablet     Refill:  0       My impressions and treatment recommendations were discussed in detail with the patient who verbalized understanding and had no further questions. This is a 58-year-old female returns to office with severe bilateral axial neck pain, left greater than right secondary to cervical spondylosis. She last underwent C7-T1 cervical epidural steroid injection in May 2023 with 1 month of notable relief. She was supposed to be scheduled for medial branch block after this, however this was not done. We will schedule her for left C3-5 MBB #1 followed by right C3-5 MBB #1. Followed by radiofrequency ablation if greater than 80% relief. In the interim, I will order tramadol 50 mg twice daily as needed to take for increased neck pain. Will also order Robaxin 500 mg twice daily as needed for muscle spasm. We will additionally order a cervical traction collar for her as well. [place] Prescription Drug Monitoring Program report was reviewed and was appropriate     Complete risks and benefits including bleeding, infection, tissue reaction, nerve injury and allergic reaction were discussed. The approach was demonstrated using models and literature was provided. Verbal and written consent was obtained. Discharge instructions were provided.  I personally saw and examined the patient and I agree with the above discussed plan of care. History of Present Illness:    Katya Jose is a 58 y.o. female who presents to [address] [facility] for interval re-evaluation of the above stated pain complaints. The patient has a past medical and chronic pain history as outlined in the assessment section. She was last seen on 2023 for left C7-T1 cervical epidural steroid injection. She reports 4 weeks of notable relief. Returns today with bilateral neck pain. It is present all day. Constant, dull/aching, throbbing, numbness, pins-and-needles. .        Other than as stated above, the patient denies any interval changes in medications, medical condition, mental condition, symptoms, or allergies since the last office visit. Review of Systems:    Review of Systems   Respiratory:  Positive for shortness of breath. Gastrointestinal:  Positive for nausea and vomiting. Musculoskeletal:  Positive for gait problem. Neurological:  Positive for dizziness. Psychiatric/Behavioral:  Positive for decreased concentration. Past Medical History:   Diagnosis Date    Chronic kidney disease     Glaucoma     Hypertension     Knee arthropathy     Left Knee    Migraine     Multiple sclerosis ([address])     Night muscle spasms     Stroke ([address])     2 strokes in past,  and        Past Surgical History:   Procedure Laterality Date    ESOPHAGOGASTRODUODENOSCOPY N/A 2016    Procedure: ESOPHAGOGASTRODUODENOSCOPY (EGD); Surgeon: Mason Cifuentes MD;  Location: [facility];   Service:     HIP ARTHROPLASTY Bilateral        Family History   Problem Relation Age of Onset    No Known Problems Mother     No Known Problems Father        Social History     Occupational History    Not on file   Tobacco Use    Smoking status: Every Day     Packs/day: 0.25     Years: 10.00     Total pack years: 2.50     Types: Cigarettes     Last attempt to quit: 2021     Years since quittin.9 Smokeless tobacco: Never    Tobacco comments:     1 or 2 a week   Vaping Use    Vaping Use: Never used   Substance and Sexual Activity    Alcohol use: Yes     Comment: Rare    Drug use: No    Sexual activity: Not Currently     Partners: Male         Current Outpatient Medications:     atorvastatin (LIPITOR) 40 mg tablet, Take 40 mg by mouth daily, Disp: , Rfl:     Aubagio 14 MG TABS, Take 1 tablet (14 mg total) by mouth in the morning, Disp: 30 tablet, Rfl: 6    clopidogrel (PLAVIX) 75 mg tablet, Take 75 mg by mouth daily, Disp: , Rfl:     ferrous sulfate 325 (65 Fe) mg tablet, Take 65 mg by mouth in the morning, Disp: , Rfl:     folic acid (FOLVITE) 1 mg tablet, Take 1 mg by mouth daily, Disp: , Rfl:     gabapentin (NEURONTIN) 800 mg tablet, Take 800 mg by mouth 4 (four) times a day, Disp: , Rfl:     hydrALAZINE (APRESOLINE) 50 mg tablet, Take 50 mg by mouth Three times a day, Disp: , Rfl:     LORazepam (ATIVAN) 1 mg tablet, Two p.o. 1 hour before MRI, may repeat 1 after 1 hour p.r.n., Disp: 3 tablet, Rfl: 0    losartan (COZAAR) 50 mg tablet, Take one tablet by mouth once a day, Disp: , Rfl:     Magnesium Oxide 400 MG CAPS, Take 1 tablet (400 mg total) by mouth in the morning, Disp: 30 capsule, Rfl: 0    meclizine (ANTIVERT) 25 mg tablet, TAKE ONE TABLET BY MOUTH THREE TIMES DAILY AS NEEDED for dizziness, Disp: 30 tablet, Rfl: 3    methocarbamol (ROBAXIN) 500 mg tablet, Take 1 tablet (500 mg total) by mouth 2 (two) times a day as needed for muscle spasms, Disp: 30 tablet, Rfl: 0    NIFEdipine ER (ADALAT CC) 60 MG 24 hr tablet, TAKE TWO TABLETS BY MOUTH EVERY DAY, Disp: , Rfl:     nitroglycerin (NITROSTAT) 0.4 mg SL tablet, PRN, Disp: , Rfl:     omeprazole (PriLOSEC) 20 mg delayed release capsule, omeprazole 20 mg capsule,delayed release, Disp: , Rfl:     rosuvastatin (CRESTOR) 40 MG tablet, Take 40 mg by mouth daily, Disp: , Rfl:     traMADol (Ultram) 50 mg tablet, Take 1 tablet (50 mg total) by mouth 2 (two) times a day as needed for severe pain, Disp: 45 tablet, Rfl: 0    Cholecalciferol 125 MCG (5000 UT) capsule, Take 5,000 Units by mouth daily (Patient not taking: Reported on 12/8/2023), Disp: , Rfl:     melatonin 3 mg, Take 3 mg by mouth daily at bedtime (Patient not taking: Reported on 12/8/2023), Disp: , Rfl:     meloxicam (MOBIC) 15 mg tablet, , Disp: , Rfl:     Allergies   Allergen Reactions    Acetaminophen Swelling    Aspirin Swelling    Ibuprofen Angioedema and Swelling     Ok if taken with omeprazole    Lisinopril Swelling       Physical Exam:    Ht 5' 11" (1.803 m)   Wt 94.3 kg (208 lb)   BMI 29.01 kg/m²     Constitutional:normal, well developed, well nourished, alert, in no distress and non-toxic and no overt pain behavior.   Eyes:anicteric  HEENT:grossly intact  Neck:supple, symmetric, trachea midline and no masses   Pulmonary:even and unlabored  Cardiovascular:No edema or pitting edema present  Skin:Normal without rashes or lesions and well hydrated  Psychiatric:Mood and affect appropriate  Neurologic:Cranial Nerves II-XII grossly intact  Musculoskeletal:normal      Imaging  No orders to display         Orders Placed This Encounter   Procedures    Durable Medical Equipment

## 2023-12-07 NOTE — PROGRESS NOTES
Pain Medicine Follow-Up Note    Assessment:  1. Cervical spondylosis    2. Cervical radiculopathy    3. Myofascial pain syndrome        Plan:  Orders Placed This Encounter   Procedures    Durable Medical Equipment     1 PNEUMATIC CERVICAL TRACTION COLLAR       New Medications Ordered This Visit   Medications    traMADol (Ultram) 50 mg tablet     Sig: Take 1 tablet (50 mg total) by mouth 2 (two) times a day as needed for severe pain     Dispense:  45 tablet     Refill:  0    methocarbamol (ROBAXIN) 500 mg tablet     Sig: Take 1 tablet (500 mg total) by mouth 2 (two) times a day as needed for muscle spasms     Dispense:  30 tablet     Refill:  0       My impressions and treatment recommendations were discussed in detail with the patient who verbalized understanding and had no further questions. This is a 50-year-old female returns to office with severe bilateral axial neck pain, left greater than right secondary to cervical spondylosis. She last underwent C7-T1 cervical epidural steroid injection in May 2023 with 1 month of notable relief. She was supposed to be scheduled for medial branch block after this, however this was not done. We will schedule her for left C3-5 MBB #1 followed by right C3-5 MBB #1. Followed by radiofrequency ablation if greater than 80% relief. In the interim, I will order tramadol 50 mg twice daily as needed to take for increased neck pain. Will also order Robaxin 500 mg twice daily as needed for muscle spasm. We will additionally order a cervical traction collar for her as well. [place] Prescription Drug Monitoring Program report was reviewed and was appropriate     Complete risks and benefits including bleeding, infection, tissue reaction, nerve injury and allergic reaction were discussed. The approach was demonstrated using models and literature was provided. Verbal and written consent was obtained. Discharge instructions were provided.  I personally saw and examined the patient and I agree with the above discussed plan of care. History of Present Illness:    Justine Bynum is a 58 y.o. female who presents to [facility] for interval re-evaluation of the above stated pain complaints. The patient has a past medical and chronic pain history as outlined in the assessment section. She was last seen on 2023 for left C7-T1 cervical epidural steroid injection. She reports 4 weeks of notable relief. Returns today with bilateral neck pain. It is present all day. Constant, dull/aching, throbbing, numbness, pins-and-needles. .        Other than as stated above, the patient denies any interval changes in medications, medical condition, mental condition, symptoms, or allergies since the last office visit. Review of Systems:    Review of Systems   Respiratory:  Positive for shortness of breath. Gastrointestinal:  Positive for nausea and vomiting. Musculoskeletal:  Positive for gait problem. Neurological:  Positive for dizziness. Psychiatric/Behavioral:  Positive for decreased concentration. Past Medical History:   Diagnosis Date    Chronic kidney disease     Glaucoma     Hypertension     Knee arthropathy     Left Knee    Migraine     Multiple sclerosis ([address])     Night muscle spasms     Stroke ([address])     2 strokes in past,  and        Past Surgical History:   Procedure Laterality Date    ESOPHAGOGASTRODUODENOSCOPY N/A 2016    Procedure: ESOPHAGOGASTRODUODENOSCOPY (EGD); Surgeon: Ming Posada MD;  Location: [facility];   Service:     HIP ARTHROPLASTY Bilateral        Family History   Problem Relation Age of Onset    No Known Problems Mother     No Known Problems Father        Social History     Occupational History    Not on file   Tobacco Use    Smoking status: Every Day     Packs/day: 0.25     Years: 10.00     Total pack years: 2.50     Types: Cigarettes     Last attempt to quit: 2021     Years since quittin.9 Smokeless tobacco: Never    Tobacco comments:     1 or 2 a week   Vaping Use    Vaping Use: Never used   Substance and Sexual Activity    Alcohol use: Yes     Comment: Rare    Drug use: No    Sexual activity: Not Currently     Partners: Male         Current Outpatient Medications:     atorvastatin (LIPITOR) 40 mg tablet, Take 40 mg by mouth daily, Disp: , Rfl:     Aubagio 14 MG TABS, Take 1 tablet (14 mg total) by mouth in the morning, Disp: 30 tablet, Rfl: 6    clopidogrel (PLAVIX) 75 mg tablet, Take 75 mg by mouth daily, Disp: , Rfl:     ferrous sulfate 325 (65 Fe) mg tablet, Take 65 mg by mouth in the morning, Disp: , Rfl:     folic acid (FOLVITE) 1 mg tablet, Take 1 mg by mouth daily, Disp: , Rfl:     gabapentin (NEURONTIN) 800 mg tablet, Take 800 mg by mouth 4 (four) times a day, Disp: , Rfl:     hydrALAZINE (APRESOLINE) 50 mg tablet, Take 50 mg by mouth Three times a day, Disp: , Rfl:     LORazepam (ATIVAN) 1 mg tablet, Two p.o. 1 hour before MRI, may repeat 1 after 1 hour p.r.n., Disp: 3 tablet, Rfl: 0    losartan (COZAAR) 50 mg tablet, Take one tablet by mouth once a day, Disp: , Rfl:     Magnesium Oxide 400 MG CAPS, Take 1 tablet (400 mg total) by mouth in the morning, Disp: 30 capsule, Rfl: 0    meclizine (ANTIVERT) 25 mg tablet, TAKE ONE TABLET BY MOUTH THREE TIMES DAILY AS NEEDED for dizziness, Disp: 30 tablet, Rfl: 3    methocarbamol (ROBAXIN) 500 mg tablet, Take 1 tablet (500 mg total) by mouth 2 (two) times a day as needed for muscle spasms, Disp: 30 tablet, Rfl: 0    NIFEdipine ER (ADALAT CC) 60 MG 24 hr tablet, TAKE TWO TABLETS BY MOUTH EVERY DAY, Disp: , Rfl:     nitroglycerin (NITROSTAT) 0.4 mg SL tablet, PRN, Disp: , Rfl:     omeprazole (PriLOSEC) 20 mg delayed release capsule, omeprazole 20 mg capsule,delayed release, Disp: , Rfl:     rosuvastatin (CRESTOR) 40 MG tablet, Take 40 mg by mouth daily, Disp: , Rfl:     traMADol (Ultram) 50 mg tablet, Take 1 tablet (50 mg total) by mouth 2 (two) times a day as needed for severe pain, Disp: 45 tablet, Rfl: 0    Cholecalciferol 125 MCG (5000 UT) capsule, Take 5,000 Units by mouth daily (Patient not taking: Reported on 12/8/2023), Disp: , Rfl:     melatonin 3 mg, Take 3 mg by mouth daily at bedtime (Patient not taking: Reported on 12/8/2023), Disp: , Rfl:     meloxicam (MOBIC) 15 mg tablet, , Disp: , Rfl:     Allergies   Allergen Reactions    Acetaminophen Swelling    Aspirin Swelling    Ibuprofen Angioedema and Swelling     Ok if taken with omeprazole    Lisinopril Swelling       Physical Exam:    Ht 5' 11" (1.803 m)   Wt 94.3 kg (208 lb)   BMI 29.01 kg/m²     Constitutional:normal, well developed, well nourished, alert, in no distress and non-toxic and no overt pain behavior.   Eyes:anicteric  HEENT:grossly intact  Neck:supple, symmetric, trachea midline and no masses   Pulmonary:even and unlabored  Cardiovascular:No edema or pitting edema present  Skin:Normal without rashes or lesions and well hydrated  Psychiatric:Mood and affect appropriate  Neurologic:Cranial Nerves II-XII grossly intact  Musculoskeletal:normal      Imaging  No orders to display         Orders Placed This Encounter   Procedures    Durable Medical Equipment

## 2023-12-08 ENCOUNTER — OFFICE VISIT (OUTPATIENT)
Dept: PAIN MEDICINE | Facility: CLINIC | Age: 63
End: 2023-12-08
Payer: COMMERCIAL

## 2023-12-08 ENCOUNTER — TELEPHONE (OUTPATIENT)
Dept: RADIOLOGY | Facility: CLINIC | Age: 63
End: 2023-12-08

## 2023-12-08 VITALS — HEIGHT: 71 IN | WEIGHT: 208 LBS | BODY MASS INDEX: 29.12 KG/M2

## 2023-12-08 DIAGNOSIS — M79.18 MYOFASCIAL PAIN SYNDROME: ICD-10-CM

## 2023-12-08 DIAGNOSIS — M47.812 CERVICAL SPONDYLOSIS: Primary | ICD-10-CM

## 2023-12-08 DIAGNOSIS — M54.12 CERVICAL RADICULOPATHY: ICD-10-CM

## 2023-12-08 PROCEDURE — 99214 OFFICE O/P EST MOD 30 MIN: CPT | Performed by: STUDENT IN AN ORGANIZED HEALTH CARE EDUCATION/TRAINING PROGRAM

## 2023-12-08 RX ORDER — METHOCARBAMOL 500 MG/1
500 TABLET, FILM COATED ORAL 2 TIMES DAILY PRN
Qty: 30 TABLET | Refills: 0 | Status: SHIPPED | OUTPATIENT
Start: 2023-12-08

## 2023-12-08 RX ORDER — TRAMADOL HYDROCHLORIDE 50 MG/1
50 TABLET ORAL 2 TIMES DAILY PRN
Qty: 45 TABLET | Refills: 0 | Status: SHIPPED | OUTPATIENT
Start: 2023-12-08

## 2023-12-08 NOTE — TELEPHONE ENCOUNTER
----- Message from Kaitlin Iraheta MD sent at 12/8/2023 12:15 PM EST -----  Hi everyone. This patient was ordered cervical MBB back in the summertime. She was never scheduled for this. Patient needs to be scheduled for this and double booked if possible. Not sure where the communication fell through. Will start left side first. Thanks.

## 2023-12-11 ENCOUNTER — TELEPHONE (OUTPATIENT)
Dept: RADIOLOGY | Facility: CLINIC | Age: 63
End: 2023-12-11

## 2023-12-11 ENCOUNTER — DOCUMENTATION (OUTPATIENT)
Dept: PAIN MEDICINE | Facility: CLINIC | Age: 63
End: 2023-12-11

## 2023-12-11 NOTE — TELEPHONE ENCOUNTER
Received message from [employer] team below.    " I am working on [employer] for [employer] for tomorrow. Looks like Deneen's note says to start with Left but she on schedule for Right. Can you change that? "    Please change 12/12 procedure to left sided.  Order is in epic

## 2023-12-12 ENCOUNTER — HOSPITAL ENCOUNTER (OUTPATIENT)
Dept: RADIOLOGY | Facility: CLINIC | Age: 63
Discharge: HOME | End: 2023-12-12
Payer: COMMERCIAL

## 2023-12-12 VITALS
SYSTOLIC BLOOD PRESSURE: 99 MMHG | HEART RATE: 78 BPM | DIASTOLIC BLOOD PRESSURE: 70 MMHG | OXYGEN SATURATION: 95 % | TEMPERATURE: 99.4 F | RESPIRATION RATE: 18 BRPM

## 2023-12-12 DIAGNOSIS — M47.812 CERVICAL SPONDYLOSIS: ICD-10-CM

## 2023-12-12 PROCEDURE — 64491 INJ PARAVERT F JNT C/T 2 LEV: CPT | Performed by: STUDENT IN AN ORGANIZED HEALTH CARE EDUCATION/TRAINING PROGRAM

## 2023-12-12 PROCEDURE — 64490 INJ PARAVERT F JNT C/T 1 LEV: CPT | Performed by: STUDENT IN AN ORGANIZED HEALTH CARE EDUCATION/TRAINING PROGRAM

## 2023-12-12 RX ORDER — BUPIVACAINE HCL/PF 2.5 MG/ML
1.5 VIAL (ML) INJECTION ONCE
Status: COMPLETED | OUTPATIENT
Start: 2023-12-12 | End: 2023-12-12

## 2023-12-12 RX ADMIN — Medication 1.5 ML: at 13:56

## 2023-12-12 NOTE — DISCHARGE INSTR - LAB

## 2023-12-12 NOTE — INTERVAL H&P NOTE
Update: (This section must be completed if the H&P was completed greater than 24 hrs to procedure or admission)    H&P reviewed. After examining the patient, I find no changed to the H&P since it had been written. Patient re-evaluated.  Accept as history and physical.    William Villa MD/December 12, 2023/1:48 PM

## 2023-12-15 ENCOUNTER — TELEPHONE (OUTPATIENT)
Dept: RADIOLOGY | Facility: CLINIC | Age: 63
End: 2023-12-15

## 2023-12-20 ENCOUNTER — HOSPITAL ENCOUNTER (OUTPATIENT)
Dept: MRI IMAGING | Facility: HOSPITAL | Age: 63
Discharge: HOME | End: 2023-12-20
Attending: PSYCHIATRY & NEUROLOGY
Payer: COMMERCIAL

## 2023-12-20 DIAGNOSIS — R55 SYNCOPE: ICD-10-CM

## 2023-12-20 DIAGNOSIS — G35 MULTIPLE SCLEROSIS (HCC): ICD-10-CM

## 2023-12-20 DIAGNOSIS — H81.13 BENIGN PAROXYSMAL POSITIONAL VERTIGO DUE TO BILATERAL VESTIBULAR DISORDER: ICD-10-CM

## 2023-12-20 PROCEDURE — 70553 MRI BRAIN STEM W/O & W/DYE: CPT

## 2023-12-20 PROCEDURE — G1004 CDSM NDSC: HCPCS

## 2023-12-20 PROCEDURE — A9585 GADOBUTROL INJECTION: HCPCS | Performed by: PSYCHIATRY & NEUROLOGY

## 2023-12-20 RX ORDER — GADOBUTROL 604.72 MG/ML
9 INJECTION INTRAVENOUS
Status: COMPLETED | OUTPATIENT
Start: 2023-12-20 | End: 2023-12-20

## 2023-12-20 RX ADMIN — GADOBUTROL 9 ML: 604.72 INJECTION INTRAVENOUS at 08:18

## 2023-12-27 ENCOUNTER — DOCUMENTATION (OUTPATIENT)
Dept: PAIN MEDICINE | Facility: CLINIC | Age: 63
End: 2023-12-27

## 2024-01-11 ENCOUNTER — TELEPHONE (OUTPATIENT)
Dept: PAIN MEDICINE | Facility: CLINIC | Age: 64
End: 2024-01-11

## 2024-01-11 DIAGNOSIS — M47.812 CERVICAL SPONDYLOSIS: Primary | ICD-10-CM

## 2024-01-11 NOTE — TELEPHONE ENCOUNTER
Caller: Molly  Doctor/office: Deneen  CB#: [phone]    % of improvement: 2%  Pain Scale (1-10): 9/10    Called to update us on her pain level from procedure on 12/12    What is next step?

## 2024-01-15 NOTE — TELEPHONE ENCOUNTER
LISA for pt to call back [phone] to schedule the procedure with Dr. Brothers.    Pt called back and the call was transferred by CS but the call dropped.  I tried to call pt back two times but she did not answer.  I left another message for her.

## 2024-01-15 NOTE — TELEPHONE ENCOUNTER
Caller: patient    Doctor: trace    Reason for call: calling back, stated missed a call    Call back#:      Paramedian Forehead Flap Text: A decision was made to reconstruct the defect utilizing an interpolation axial flap and a staged reconstruction.  A telfa template was made of the defect.  This telfa template was then used to outline the paramedian forehead pedicle flap.  The donor area for the pedicle flap was then injected with anesthesia.  The flap was excised through the skin and subcutaneous tissue down to the layer of the underlying musculature.  The pedicle flap was carefully excised within this deep plane to maintain its blood supply.  The edges of the donor site were undermined.   The donor site was closed in a primary fashion.  The pedicle was then rotated into position and sutured.  Once the tube was sutured into place, adequate blood supply was confirmed with blanching and refill.  The pedicle was then wrapped with xeroform gauze and dressed appropriately with a telfa and gauze bandage to ensure continued blood supply and protect the attached pedicle.

## 2024-01-15 NOTE — TELEPHONE ENCOUNTER
[facility] EMERGENCY DEPARTMENT ENCOUNTER    12/20/19 9:56 AM        CHIEF COMPLAINT     Chief Complaint   Patient presents with   • Derm Problem       HPI  Mikel Hurley is a 32 year old male with no PMH who presents to the ED with complaint of facial rash.  Patient states that he went [facility] two days ago where he got his hair trimmed.  He states that he noticed yesterday that he was developing a rash along his hairline and going onto his forehead and eyebrow region.  He states that he took a Benadryl that he bought over-the-counter, however it has not been providing relief.  Patient states that when he woke this morning, the rash was much worse and included swelling of the forehead and temple region.  He otherwise denies any tongue or lip swelling, trouble swallowing or breathing, nausea, vomiting, rash elsewhere.  He denies history of similar reactions.  He denies use of any hair dyes or other new products.      ALLERGIES    ALLERGIES:  No Known Allergies    CURRENT MEDICATIONS    No current facility-administered medications for this encounter.      Current Outpatient Medications   Medication Sig Dispense Refill   • [START ON 12/21/2019] predniSONE (DELTASONE) 20 MG tablet Take 2 tablets by mouth daily for 3 days. Do not start before December 21, 2019. 6 tablet 0   • DiazePAM (VALIUM PO)      • carisoprodol (SOMA) 350 MG tablet Take 1 tablet by mouth nightly as needed for Muscle spasms. 30 tablet 0       PAST MEDICAL HISTORY    No past medical history on file.    SURGICAL HISTORY    No past surgical history on file.    SOCIAL HISTORY    Social History     Tobacco Use   • Smoking status: Light Tobacco Smoker     Types: Cigarettes   • Smokeless tobacco: Never Used   • Tobacco comment: \" Smokes Very Little\"       FAMILY HISTORY    No family history on file.    REVIEW OF SYSTEMS      Constitutional:  Denies fever, chills, change in appetite or activity    Respiratory:  Denies cough, shortness of  Pt returning call and transferred to .    breath or wheezing.   Cardiovascular:  Denies chest pain or edema.  GI:  Denies abdominal pain, nausea, vomiting or diarrhea.   Integument: Reports rash.  Neurologic:  Denies focal weakness or sensory changes.       PHYSICAL EXAM    Vitals:    12/20/19 0921   BP: 119/77   Pulse: 70   Resp: 20   Temp: 97.6 °F (36.4 °C)   TempSrc: Oral   SpO2: 100%   Weight: 81.6 kg   Height: 5' 7\" (1.702 m)       *Vital signs and nursing notes personally reviewed  Constitutional:  Well developed, well nourished. Well-appearing. No acute distress, non-toxic appearance.    Eyes:  PERRL, conjunctivae normal.   HENT:  Head is atraumatic. Skin of the forehead and right temple region with numerous flesh-colored pinpoint papules, no vesicles and no pustules, no drainage.  There is associated facial edema at the rash including the forehead and right temple and bilateral eyebrows.   External ears without lesions. Nose midline. Oropharynx moist. No lip or tongue swelling. No drooling.  Neck: Normal range of motion. No tenderness. Supple. No palpable lymph nodes   Respiratory:  No respiratory distress, normal breath sounds. No crackles. No wheezing.   Cardiovascular:  Normal rate, normal rhythm. No murmurs, gallops, or rubs.   GI:  Soft, nondistended. Normal bowel sounds, nontender. No appreciable hepatomegaly, splenomegaly, mass, rebound or guarding.   Integument:  Well hydrated. SEE HENT FOR RASH.  Lymph: No appreciable lymphadenopathy.  Neurologic:  Alert & oriented x 3. GCS 15.  Psychiatric:  Speech and behavior appropriate.             RADIOLOGY    No orders to display         LABS    No results found for this visit on 12/20/19.      ED MEDICATIONS      ED Medication Orders (From admission, onward)    Ordered Start     Status Ordering Provider    12/20/19 0956 12/20/19 0957  predniSONE (DELTASONE) tablet 60 mg  ONCE      Last MAR action:  Given MARIA ESTHER CORREIA              ED COURSE & MEDICAL DECISION MAKING      Patient presenting to the  emergency department with facial rash after recently getting his hair trimmed at the [facility], rash appears to be flesh-colored pinpoint papules, no vesicles and no pustules, no drainage.  There is associated facial edema at the rash including the forehead and right temple and bilateral eyebrows.  There is no lip or tongue swelling, trouble swallowing or breathing, nausea or vomiting, or further sign of significant allergic reaction or anaphylaxis.  Question reaction from possible [facility] equipment/tools.  Advised patient that given significant rash with edema, will plan to treat with prednisone.  Patient also taking over-the-counter Benadryl, encouraged him to use that as well.  Advised that can make him drowsy, so should take cautiously during the day.  Discussed supportive measures, follow-up, as well as warning signs and reasons to return.  Patient receptive.  Will discharge at this time.      Impression:  The primary encounter diagnosis was Dermatitis of face. A diagnosis of Facial swelling was also pertinent to this visit.    Follow Up:  your PCP      Follow-up with your PCP for continued evaluation and care following this ED visit.    [facility]  [address] [place] [id number]  [phone]    Return to ED with worsening rash, fevers, vomiting, troubles swallowing or breathing, lip or tongue swelling.       Discharge Medication List as of 12/20/2019 10:15 AM      START taking these medications    Details   predniSONE (DELTASONE) 20 MG tablet Take 2 tablets by mouth daily for 3 days. Do not start before December 21, 2019.Eprescribe, Disp-6 tablet, R-0             Pt is discharged in stable condition.      Attending physician: Dr. Reaves  Physician Assistant: RACHELLE Roy PA-C  12/20/19 0693

## 2024-01-17 NOTE — TELEPHONE ENCOUNTER
Tried calling patient to schedule procedure. Call goes straight to voicemail. Voicemail was left.

## 2024-01-18 ENCOUNTER — TELEPHONE (OUTPATIENT)
Dept: PAIN MEDICINE | Facility: CLINIC | Age: 64
End: 2024-01-18

## 2024-01-18 ENCOUNTER — TELEPHONE (OUTPATIENT)
Dept: RADIOLOGY | Facility: CLINIC | Age: 64
End: 2024-01-18

## 2024-01-18 NOTE — TELEPHONE ENCOUNTER
Pt was scheduled while in the office. All procedure instructions were reviewed. As per pt, she is not diabetic, she is on Eliquis. Pt is aware about not taking  PRN pain meds 6hrs prior and 6-8 hrs after procedure.

## 2024-01-31 ENCOUNTER — TELEPHONE (OUTPATIENT)
Dept: GASTROENTEROLOGY | Facility: CLINIC | Age: 64
End: 2024-01-31

## 2024-01-31 NOTE — TELEPHONE ENCOUNTER
Left message for patient to call and schedule an appointment for gastro reflux ref from Dr Nielson

## 2024-02-07 ENCOUNTER — TELEPHONE (OUTPATIENT)
Dept: NEUROLOGY | Facility: CLINIC | Age: 64
End: 2024-02-07

## 2024-02-07 NOTE — TELEPHONE ENCOUNTER
Spoke to patient, she said she already has a gastro dr that she sees, did not want to schedule here

## 2024-02-27 ENCOUNTER — HOSPITAL ENCOUNTER (OUTPATIENT)
Dept: RADIOLOGY | Facility: CLINIC | Age: 64
Discharge: HOME | End: 2024-02-27
Payer: COMMERCIAL

## 2024-02-27 VITALS
TEMPERATURE: 97.5 F | SYSTOLIC BLOOD PRESSURE: 151 MMHG | HEART RATE: 48 BPM | RESPIRATION RATE: 20 BRPM | DIASTOLIC BLOOD PRESSURE: 104 MMHG | OXYGEN SATURATION: 94 %

## 2024-02-27 DIAGNOSIS — M47.812 CERVICAL SPONDYLOSIS: ICD-10-CM

## 2024-02-27 PROCEDURE — 64490 INJ PARAVERT F JNT C/T 1 LEV: CPT | Performed by: STUDENT IN AN ORGANIZED HEALTH CARE EDUCATION/TRAINING PROGRAM

## 2024-02-27 PROCEDURE — 64491 INJ PARAVERT F JNT C/T 2 LEV: CPT | Performed by: STUDENT IN AN ORGANIZED HEALTH CARE EDUCATION/TRAINING PROGRAM

## 2024-02-27 RX ORDER — BUPIVACAINE HYDROCHLORIDE 7.5 MG/ML
1.5 INJECTION, SOLUTION EPIDURAL; RETROBULBAR ONCE
Status: COMPLETED | OUTPATIENT
Start: 2024-02-27 | End: 2024-02-27

## 2024-02-27 RX ADMIN — BUPIVACAINE HYDROCHLORIDE 1.5 ML: 7.5 INJECTION, SOLUTION EPIDURAL; RETROBULBAR at 09:27

## 2024-02-27 NOTE — H&P
History of Present Illness: The patient is a 63 y.o. female who presents with complaints of left neck pain    Past Medical History:   Diagnosis Date    Chronic kidney disease     Glaucoma     Hypertension     Knee arthropathy 2023    Left Knee    Migraine     Multiple sclerosis (HCC)     Night muscle spasms     Stroke (HCC)     2 strokes in past, 2011 and 2013       Past Surgical History:   Procedure Laterality Date    ESOPHAGOGASTRODUODENOSCOPY N/A 8/9/2016    Procedure: ESOPHAGOGASTRODUODENOSCOPY (EGD);  Surgeon: Kana Whatley MD;  Location: [facility];  Service:     HIP ARTHROPLASTY Bilateral          Current Outpatient Medications:     atorvastatin (LIPITOR) 40 mg tablet, Take 40 mg by mouth daily, Disp: , Rfl:     Aubagio 14 MG TABS, Take 1 tablet (14 mg total) by mouth in the morning, Disp: 30 tablet, Rfl: 6    Cholecalciferol 125 MCG (5000 UT) capsule, Take 5,000 Units by mouth daily (Patient not taking: Reported on 12/8/2023), Disp: , Rfl:     clopidogrel (PLAVIX) 75 mg tablet, Take 75 mg by mouth daily, Disp: , Rfl:     ferrous sulfate 325 (65 Fe) mg tablet, Take 65 mg by mouth in the morning, Disp: , Rfl:     folic acid (FOLVITE) 1 mg tablet, Take 1 mg by mouth daily, Disp: , Rfl:     gabapentin (NEURONTIN) 800 mg tablet, Take 800 mg by mouth 4 (four) times a day, Disp: , Rfl:     hydrALAZINE (APRESOLINE) 50 mg tablet, Take 50 mg by mouth Three times a day, Disp: , Rfl:     LORazepam (ATIVAN) 1 mg tablet, Two p.o. 1 hour before MRI, may repeat 1 after 1 hour p.r.n., Disp: 3 tablet, Rfl: 0    losartan (COZAAR) 50 mg tablet, Take one tablet by mouth once a day, Disp: , Rfl:     Magnesium Oxide 400 MG CAPS, Take 1 tablet (400 mg total) by mouth in the morning, Disp: 30 capsule, Rfl: 0    meclizine (ANTIVERT) 25 mg tablet, TAKE ONE TABLET BY MOUTH THREE TIMES DAILY AS NEEDED for dizziness, Disp: 30 tablet, Rfl: 3    meloxicam (MOBIC) 15 mg tablet, , Disp: , Rfl:     methocarbamol (ROBAXIN) 500 mg tablet, Take 1  tablet (500 mg total) by mouth 2 (two) times a day as needed for muscle spasms, Disp: 30 tablet, Rfl: 0    NIFEdipine ER (ADALAT CC) 60 MG 24 hr tablet, TAKE TWO TABLETS BY MOUTH EVERY DAY, Disp: , Rfl:     nitroglycerin (NITROSTAT) 0.4 mg SL tablet, PRN, Disp: , Rfl:     omeprazole (PriLOSEC) 20 mg delayed release capsule, omeprazole 20 mg capsule,delayed release, Disp: , Rfl:     rosuvastatin (CRESTOR) 40 MG tablet, Take 40 mg by mouth daily, Disp: , Rfl:     traMADol (Ultram) 50 mg tablet, Take 1 tablet (50 mg total) by mouth 2 (two) times a day as needed for severe pain, Disp: 45 tablet, Rfl: 0    Allergies   Allergen Reactions    Acetaminophen Swelling    Aspirin Swelling    Ibuprofen Angioedema and Swelling     Ok if taken with omeprazole    Lisinopril Swelling       Physical Exam:   Vitals:    02/27/24 0902   Pulse: (!) 49   Resp: 20   Temp: 97.5 °F (36.4 °C)   SpO2: 97%     General: Awake, Alert, Oriented x 3, Mood and affect appropriate  Respiratory: Respirations even and unlabored  Cardiovascular: Peripheral pulses intact; no edema  Musculoskeletal Exam: ttp left cervical region    ASA Score: 3    Patient/Chart Verification  Patient ID Verified: Verbal  ID Band Applied: No  Consents Confirmed: To be obtained in the Pre-Procedure area  H&P( within 30 days) Verified: To be obtained in the Pre-Procedure area  Interval H&P(within 24 hr) Complete (required for Outpatients and Surgery Admit only): To be obtained in the Pre-Procedure area  Allergies Reviewed: Yes  Anticoag/NSAID held?: No (on eliquis)  Currently on antibiotics?: No  Pregnancy denied?: NA    Assessment:   1. Cervical spondylosis        Plan: left C3-5 MBB #2

## 2024-02-27 NOTE — DISCHARGE INSTR - LAB

## 2024-02-29 ENCOUNTER — DOCUMENTATION (OUTPATIENT)
Dept: PAIN MEDICINE | Facility: CLINIC | Age: 64
End: 2024-02-29

## 2024-02-29 ENCOUNTER — TELEPHONE (OUTPATIENT)
Dept: RADIOLOGY | Facility: CLINIC | Age: 64
End: 2024-02-29

## 2024-02-29 DIAGNOSIS — M47.812 FACET ARTHROPATHY, CERVICAL: Primary | ICD-10-CM

## 2024-02-29 NOTE — TELEPHONE ENCOUNTER
Please order RFA    S/P Left C3, C4, C5 MBB #2    Pain diary received   Pt reports % relief starting at hour 4 through hour 12    Per protocol, ok for RFA

## 2024-03-05 ENCOUNTER — TELEPHONE (OUTPATIENT)
Dept: RADIOLOGY | Facility: CLINIC | Age: 64
End: 2024-03-05

## 2024-03-12 ENCOUNTER — TELEPHONE (OUTPATIENT)
Dept: RADIOLOGY | Facility: CLINIC | Age: 64
End: 2024-03-12

## 2024-03-12 NOTE — TELEPHONE ENCOUNTER
Called pt to schedule procedure with Dr. Brothers.  Unable to leave a message as the mailbox was full.  
Caller: Molly    Doctor: dr woodward    Reason for call: pt calling to schedule her procedure. Her vm doesn't work.     Call back#: [phone]  
Caller: Patient     Doctor: Deneen     Reason for call: Returning call to schedule     Call back#: [phone]     
Caller: Patient    Doctor: Deneen    Reason for call: Returning call. Surgery Coordinator unavailable at the time of call    Call back#: [phone]  
Scheduled pt for 4/9  
Tried to call pt to schedule RFA -- no answer at time of call and pt's vm is full   
Tried to call pt to schedule RFA, no answer and voicemail was full.    Called pts ER contact -- he will let pt know the office has been trying to reach her.   
Statement Selected

## 2024-03-12 NOTE — TELEPHONE ENCOUNTER
Scheduled pt for RFA on 4/9/24.    No med holds needed for procedure.    Instructions given over phone, pt does not have myc acct.    Have you completed PT/HEP/Chiro in the past 6 months for dedicated area? no  If yes, how long did you complete?  What was the frequency?  Did it provide relief?  If no, reason therapy was not completed?

## 2024-03-19 ENCOUNTER — TELEPHONE (OUTPATIENT)
Dept: NEUROLOGY | Facility: CLINIC | Age: 64
End: 2024-03-19

## 2024-03-26 NOTE — TELEPHONE ENCOUNTER
Attempted to confirm 3/27/24 appointment with Dr Mcintyre.  Unable to do so since the mailbox is full.

## 2024-03-27 ENCOUNTER — OFFICE VISIT (OUTPATIENT)
Dept: NEUROLOGY | Facility: CLINIC | Age: 64
End: 2024-03-27
Payer: COMMERCIAL

## 2024-03-27 VITALS
WEIGHT: 207.6 LBS | DIASTOLIC BLOOD PRESSURE: 78 MMHG | SYSTOLIC BLOOD PRESSURE: 140 MMHG | HEIGHT: 71 IN | BODY MASS INDEX: 29.06 KG/M2 | HEART RATE: 76 BPM

## 2024-03-27 DIAGNOSIS — G35 MULTIPLE SCLEROSIS (HCC): Primary | ICD-10-CM

## 2024-03-27 DIAGNOSIS — Z86.73 HISTORY OF LACUNAR CEREBROVASCULAR ACCIDENT (CVA): ICD-10-CM

## 2024-03-27 DIAGNOSIS — R55 SYNCOPE: ICD-10-CM

## 2024-03-27 PROCEDURE — 99213 OFFICE O/P EST LOW 20 MIN: CPT | Performed by: PSYCHIATRY & NEUROLOGY

## 2024-03-27 RX ORDER — CYCLOBENZAPRINE HCL 10 MG
TABLET ORAL
COMMUNITY
Start: 2024-01-16

## 2024-03-27 RX ORDER — TRAZODONE HYDROCHLORIDE 100 MG/1
TABLET ORAL
COMMUNITY
Start: 2024-03-16

## 2024-03-27 RX ORDER — METOPROLOL SUCCINATE 50 MG
TABLET, EXTENDED RELEASE 24 HR ORAL
COMMUNITY
Start: 2024-01-15 | End: 2025-01-15

## 2024-03-27 RX ORDER — APIXABAN 5 MG/1
TABLET, FILM COATED ORAL
COMMUNITY
Start: 2024-01-15 | End: 2025-01-15

## 2024-03-27 RX ORDER — CHLORTHALIDONE 25 MG/1
25 TABLET ORAL DAILY
COMMUNITY
Start: 2024-02-29 | End: 2025-02-28

## 2024-03-27 RX ORDER — CIPROFLOXACIN 500 MG/1
TABLET, FILM COATED ORAL
COMMUNITY
Start: 2024-02-04

## 2024-03-27 RX ORDER — FLUTICASONE PROPIONATE 50 MCG
SPRAY, SUSPENSION (ML) NASAL
COMMUNITY
Start: 2024-03-13

## 2024-03-27 NOTE — PROGRESS NOTES
Molly Villafana is a 63 y.o. female returns in follow-up with history of MS and syncope    Assessment:  1. Multiple sclerosis (HCC)    2. Syncope    3. History of lacunar cerebrovascular accident (CVA)        Plan:  Continue Aubagio  Follow-up 6 months    Discussion:  Isatu has been stable from the standpoint of her MS and has not had any further stroke symptoms.  She continues to report issues of syncope which I suspect are cardiovascular etiology.  No clear neurologic etiology to her symptoms.  I will see her back in follow-up in 6 months      Subjective:    HPI  Molly returns in follow-up today.  She denies any MS symptoms.  She has not had any stroke symptoms.  She continues report episodes of syncope that typically occur when she is standing.  She states the last time this occurred was Friday.  She states she was in her kitchen suddenly she states she noted flashing lights in her vision and the next thing she knows she was on the floor.  She has been worked up from a neurologic standpoint with no clear etiology including a normal EEG.  Recent brain MRI demonstrates microangiopathic ischemic disease versus demyelinating disease.  CTAs in the past have not demonstrated any evidence of significant flow void issues.  She has had some issues with bradycardia and reports that recently her loop recorder demonstrated some atrial fibrillation.  She has not had a tilt table test done.  She continues report chronic pain in her shoulder and knee as well as her neck and is being seen by orthopedics and pain management to control these      Past Medical History:   Diagnosis Date    Chronic kidney disease     Glaucoma     Hypertension     Knee arthropathy 2023    Left Knee    Migraine     Multiple sclerosis (HCC)     Night muscle spasms     Stroke (HCC)     2 strokes in past, 2011 and 2013       Family History:  Family History   Problem Relation Age of Onset    No Known Problems Mother     No Known Problems Father        Past  Surgical History:  Past Surgical History:   Procedure Laterality Date    ESOPHAGOGASTRODUODENOSCOPY N/A 8/9/2016    Procedure: ESOPHAGOGASTRODUODENOSCOPY (EGD);  Surgeon: Kana Whatley MD;  Location: [facility];  Service:     HIP ARTHROPLASTY Bilateral        Social History:   reports that she has been smoking cigarettes. She started smoking about 13 years ago. She has a 2.5 pack-year smoking history. She has never used smokeless tobacco. She reports current alcohol use. She reports that she does not use drugs.    Allergies:  Acetaminophen, Aspirin, Ibuprofen, and Lisinopril      Current Outpatient Medications:     atorvastatin (LIPITOR) 40 mg tablet, Take 40 mg by mouth daily, Disp: , Rfl:     Aubagio 14 MG TABS, Take 1 tablet (14 mg total) by mouth in the morning, Disp: 30 tablet, Rfl: 6    chlorthalidone 25 mg tablet, Take 25 mg by mouth daily, Disp: , Rfl:     ciprofloxacin (CIPRO) 500 mg tablet, , Disp: , Rfl:     cyclobenzaprine (FLEXERIL) 10 mg tablet, , Disp: , Rfl:     Eliquis 5 MG, 5 mg twice a day by oral route., Disp: , Rfl:     ferrous sulfate 325 (65 Fe) mg tablet, Take 65 mg by mouth in the morning, Disp: , Rfl:     fluticasone (FLONASE) 50 mcg/act nasal spray, , Disp: , Rfl:     folic acid (FOLVITE) 1 mg tablet, Take 1 mg by mouth daily, Disp: , Rfl:     gabapentin (NEURONTIN) 800 mg tablet, Take 800 mg by mouth 4 (four) times a day, Disp: , Rfl:     hydrALAZINE (APRESOLINE) 50 mg tablet, Take 50 mg by mouth Three times a day, Disp: , Rfl:     LORazepam (ATIVAN) 1 mg tablet, Two p.o. 1 hour before MRI, may repeat 1 after 1 hour p.r.n., Disp: 3 tablet, Rfl: 0    losartan (COZAAR) 50 mg tablet, Take one tablet by mouth once a day, Disp: , Rfl:     Magnesium Oxide 400 MG CAPS, Take 1 tablet (400 mg total) by mouth in the morning, Disp: 30 capsule, Rfl: 0    meclizine (ANTIVERT) 25 mg tablet, TAKE ONE TABLET BY MOUTH THREE TIMES DAILY AS NEEDED for dizziness, Disp: 30 tablet, Rfl: 3    methocarbamol  "(ROBAXIN) 500 mg tablet, Take 1 tablet (500 mg total) by mouth 2 (two) times a day as needed for muscle spasms, Disp: 30 tablet, Rfl: 0    nitroglycerin (NITROSTAT) 0.4 mg SL tablet, PRN, Disp: , Rfl:     omeprazole (PriLOSEC) 20 mg delayed release capsule, omeprazole 20 mg capsule,delayed release, Disp: , Rfl:     rosuvastatin (CRESTOR) 40 MG tablet, Take 40 mg by mouth daily, Disp: , Rfl:     Toprol XL 50 MG 24 hr tablet, 50 mg by oral route., Disp: , Rfl:     traMADol (Ultram) 50 mg tablet, Take 1 tablet (50 mg total) by mouth 2 (two) times a day as needed for severe pain, Disp: 45 tablet, Rfl: 0    traZODone (DESYREL) 100 mg tablet, , Disp: , Rfl:     Cholecalciferol 125 MCG (5000 UT) capsule, Take 5,000 Units by mouth daily (Patient not taking: Reported on 12/8/2023), Disp: , Rfl:     clopidogrel (PLAVIX) 75 mg tablet, Take 75 mg by mouth daily (Patient not taking: Reported on 3/27/2024), Disp: , Rfl:     meloxicam (MOBIC) 15 mg tablet, , Disp: , Rfl:     NIFEdipine ER (ADALAT CC) 60 MG 24 hr tablet, TAKE TWO TABLETS BY MOUTH EVERY DAY (Patient not taking: Reported on 3/27/2024), Disp: , Rfl:     I have reviewed the past medical, social and family history, current medications, allergies, vitals, review of systems and updated this information as appropriate today     Objective:    Vitals:  Blood pressure 140/78, pulse 76, height 5' 11\" (1.803 m), weight 94.2 kg (207 lb 9.6 oz).    Physical Exam    Neurological Exam  GENERAL: Well-developed well-nourished woman in no acute distress  HEENT/NECK: Head is atraumatic normocephalic, neck is supple  NEUROLOGIC:  Mental Status: Awake and alert without aphasia  Cranial Nerves: Extraocular movements are full. Face is symmetrical  Coordination: She ambulates with an antalgia gait pattern favoring the left side utilizing a straight cane      ROS:    Review of Systems   Constitutional:  Negative for appetite change, fatigue and fever.   HENT: Negative.  Negative for hearing " loss, tinnitus, trouble swallowing and voice change.    Eyes: Negative.  Negative for photophobia, pain and visual disturbance.   Respiratory: Negative.  Negative for shortness of breath.    Cardiovascular: Negative.  Negative for palpitations.   Gastrointestinal: Negative.  Negative for nausea and vomiting.   Endocrine: Negative.  Negative for cold intolerance.   Genitourinary: Negative.  Negative for dysuria, frequency and urgency.   Musculoskeletal:  Negative for back pain, gait problem, myalgias, neck pain and neck stiffness.   Skin: Negative.  Negative for rash.   Allergic/Immunologic: Negative.    Neurological: Negative.  Negative for dizziness, tremors, seizures, syncope, facial asymmetry, speech difficulty, weakness, light-headedness, numbness and headaches.   Hematological: Negative.  Does not bruise/bleed easily.   Psychiatric/Behavioral: Negative.  Negative for confusion, hallucinations and sleep disturbance.

## 2024-04-09 ENCOUNTER — HOSPITAL ENCOUNTER (OUTPATIENT)
Dept: RADIOLOGY | Facility: CLINIC | Age: 64
Discharge: HOME | End: 2024-04-09
Payer: COMMERCIAL

## 2024-04-09 VITALS
RESPIRATION RATE: 20 BRPM | OXYGEN SATURATION: 100 % | TEMPERATURE: 97.4 F | HEART RATE: 62 BPM | SYSTOLIC BLOOD PRESSURE: 139 MMHG | DIASTOLIC BLOOD PRESSURE: 92 MMHG

## 2024-04-09 DIAGNOSIS — M47.812 CERVICAL SPONDYLOSIS: Primary | ICD-10-CM

## 2024-04-09 DIAGNOSIS — M47.812 FACET ARTHROPATHY, CERVICAL: ICD-10-CM

## 2024-04-09 PROCEDURE — 64634 DESTROY C/TH FACET JNT ADDL: CPT | Performed by: STUDENT IN AN ORGANIZED HEALTH CARE EDUCATION/TRAINING PROGRAM

## 2024-04-09 PROCEDURE — 64633 DESTROY CERV/THOR FACET JNT: CPT | Performed by: STUDENT IN AN ORGANIZED HEALTH CARE EDUCATION/TRAINING PROGRAM

## 2024-04-09 RX ADMIN — Medication 3 ML: at 11:52

## 2024-04-09 NOTE — DISCHARGE INSTR - LAB

## 2024-04-09 NOTE — H&P
History of Present Illness: The patient is a 63 y.o. female who presents with complaints of left sided neck pain    Past Medical History:   Diagnosis Date    Chronic kidney disease     Glaucoma     Hypertension     Knee arthropathy 2023    Left Knee    Migraine     Multiple sclerosis (HCC)     Night muscle spasms     Stroke (HCC)     2 strokes in past, 2011 and 2013       Past Surgical History:   Procedure Laterality Date    ESOPHAGOGASTRODUODENOSCOPY N/A 8/9/2016    Procedure: ESOPHAGOGASTRODUODENOSCOPY (EGD);  Surgeon: Kana Whatley MD;  Location: [facility];  Service:     HIP ARTHROPLASTY Bilateral          Current Outpatient Medications:     atorvastatin (LIPITOR) 40 mg tablet, Take 40 mg by mouth daily, Disp: , Rfl:     Aubagio 14 MG TABS, Take 1 tablet (14 mg total) by mouth in the morning, Disp: 30 tablet, Rfl: 6    chlorthalidone 25 mg tablet, Take 25 mg by mouth daily, Disp: , Rfl:     Cholecalciferol 125 MCG (5000 UT) capsule, Take 5,000 Units by mouth daily (Patient not taking: Reported on 12/8/2023), Disp: , Rfl:     ciprofloxacin (CIPRO) 500 mg tablet, , Disp: , Rfl:     clopidogrel (PLAVIX) 75 mg tablet, Take 75 mg by mouth daily (Patient not taking: Reported on 3/27/2024), Disp: , Rfl:     cyclobenzaprine (FLEXERIL) 10 mg tablet, , Disp: , Rfl:     Eliquis 5 MG, 5 mg twice a day by oral route., Disp: , Rfl:     ferrous sulfate 325 (65 Fe) mg tablet, Take 65 mg by mouth in the morning, Disp: , Rfl:     fluticasone (FLONASE) 50 mcg/act nasal spray, , Disp: , Rfl:     folic acid (FOLVITE) 1 mg tablet, Take 1 mg by mouth daily, Disp: , Rfl:     gabapentin (NEURONTIN) 800 mg tablet, Take 800 mg by mouth 4 (four) times a day, Disp: , Rfl:     hydrALAZINE (APRESOLINE) 50 mg tablet, Take 50 mg by mouth Three times a day, Disp: , Rfl:     LORazepam (ATIVAN) 1 mg tablet, Two p.o. 1 hour before MRI, may repeat 1 after 1 hour p.r.n., Disp: 3 tablet, Rfl: 0    losartan (COZAAR) 50 mg tablet, Take one tablet by  mouth once a day, Disp: , Rfl:     Magnesium Oxide 400 MG CAPS, Take 1 tablet (400 mg total) by mouth in the morning, Disp: 30 capsule, Rfl: 0    meclizine (ANTIVERT) 25 mg tablet, TAKE ONE TABLET BY MOUTH THREE TIMES DAILY AS NEEDED for dizziness, Disp: 30 tablet, Rfl: 3    meloxicam (MOBIC) 15 mg tablet, , Disp: , Rfl:     methocarbamol (ROBAXIN) 500 mg tablet, Take 1 tablet (500 mg total) by mouth 2 (two) times a day as needed for muscle spasms, Disp: 30 tablet, Rfl: 0    NIFEdipine ER (ADALAT CC) 60 MG 24 hr tablet, TAKE TWO TABLETS BY MOUTH EVERY DAY (Patient not taking: Reported on 3/27/2024), Disp: , Rfl:     nitroglycerin (NITROSTAT) 0.4 mg SL tablet, PRN, Disp: , Rfl:     omeprazole (PriLOSEC) 20 mg delayed release capsule, omeprazole 20 mg capsule,delayed release, Disp: , Rfl:     rosuvastatin (CRESTOR) 40 MG tablet, Take 40 mg by mouth daily, Disp: , Rfl:     Toprol XL 50 MG 24 hr tablet, 50 mg by oral route., Disp: , Rfl:     traMADol (Ultram) 50 mg tablet, Take 1 tablet (50 mg total) by mouth 2 (two) times a day as needed for severe pain, Disp: 45 tablet, Rfl: 0    traZODone (DESYREL) 100 mg tablet, , Disp: , Rfl:     Allergies   Allergen Reactions    Acetaminophen Swelling    Aspirin Swelling    Ibuprofen Angioedema and Swelling     Ok if taken with omeprazole    Lisinopril Swelling       Physical Exam: There were no vitals filed for this visit.  General: Awake, Alert, Oriented x 3, Mood and affect appropriate  Respiratory: Respirations even and unlabored  Cardiovascular: Peripheral pulses intact; no edema  Musculoskeletal Exam: neck non erythematous no lesions    ASA Score: 3         Assessment:   1. Facet arthropathy, cervical        Plan: Left C3-5 RFA

## 2024-04-10 ENCOUNTER — TELEPHONE (OUTPATIENT)
Dept: RADIOLOGY | Facility: CLINIC | Age: 64
End: 2024-04-10

## 2024-04-10 NOTE — TELEPHONE ENCOUNTER
Pt scheduled for MBB#1 (right side) with Dr Brothers on 5/14/24    No prescription medication holds needed for MBB    Instructions reviewed with pt in office, no myc for follow up message.    Have you completed PT/HEP/Chiro in the past 6 months for dedicated area? no  If yes, how long did you complete?  What was the frequency?  Did it provide relief?  If no, reason therapy was not completed? Pt has completed successful MBB's and RFA for same level, left side, as of 4/9/24

## 2024-04-10 NOTE — TELEPHONE ENCOUNTER
KUNAL  S/w pt. S/p left cervical RFA 4/9. Pt c/o pressure at injection site. Advised pt discomfort is normal 24-48 hours after procedure. Advised ice and tylenol/ibuprofen if tolerated. Pt will cb with any worsening symptoms.

## 2024-04-23 ENCOUNTER — HOSPITAL ENCOUNTER (OUTPATIENT)
Dept: RADIOLOGY | Facility: HOSPITAL | Age: 64
Discharge: HOME | End: 2024-04-23
Payer: COMMERCIAL

## 2024-04-23 DIAGNOSIS — R13.14 DYSPHAGIA, PHARYNGOESOPHAGEAL: ICD-10-CM

## 2024-04-23 PROCEDURE — 92611 MOTION FLUOROSCOPY/SWALLOW: CPT

## 2024-04-23 PROCEDURE — 74230 X-RAY XM SWLNG FUNCJ C+: CPT

## 2024-04-23 NOTE — PROCEDURES
Speech Pathology - Modified Barium Swallow Study    Patient Name: Molly Villafana    Today's Date: 4/23/2024     Problem List  Active Problems:  There are no active Hospital Problems.      Past Medical History  Past Medical History:   Diagnosis Date    Chronic kidney disease     Glaucoma     Hypertension     Knee arthropathy 2023    Left Knee    Migraine     Multiple sclerosis (HCC)     Night muscle spasms     Stroke (HCC)     2 strokes in past, 2011 and 2013       Past Surgical History  Past Surgical History:   Procedure Laterality Date    ESOPHAGOGASTRODUODENOSCOPY N/A 8/9/2016    Procedure: ESOPHAGOGASTRODUODENOSCOPY (EGD);  Surgeon: Kana Whatley MD;  Location: [facility];  Service:     HIP ARTHROPLASTY Bilateral        Assessment Summary:    Pt presents with mild oropharyngeal dysphagia characterized by decreased bolus formation and prolonged transfer.  Delayed swallow w/ posterior spill, reduced BOT retraction, pharyngeal wave.  Noted cervical bony growths at levels C2-C5 which appear to impede bolus flow.  Noted diffuse collection of oropharyngeal retention, majority of boluses do remain along the base of tongue and valleculae.  Able to clear majority of retention with cues for secondary swallow and use of liquid wash.   No aspiration or laryngeal penetration observed during the present study.    Note: Images are available for review in PACS as desired.    Recommendations:   Recommended Diet: soft/level 3 diet and thin liquids - chopped w/ added sauces/gravy as able   Recommended Form of Medications:  as tolerated, consider cut/crushing larger as needed    Aspiration precautions and compensatory swallowing strategies: upright posture, slow rate of feeding, small bites/sips, alternating bites and sips, and multiple swallows   Consider referral to:  F/u w/ ENT  SLP Dysphagia therapy recommended: Yes- outpatient dysphagia rahul Metz demonstrated for patient as example.     Results Reviewed with: patient and MD    Pt/Family Education: initiated. Pt would benefit from continued education.    General Information;  Pt is a 63 y.o. female with a PMH remarkable for MS, reports multiple CVA.    Current concerns for dysphagia include globus sensation with solid foods in mid-neck area.  MBS was recommended to assess oropharyngeal stage swallowing skills at this time.     Prior MBS: None on file     Oral Mechanism Exam  Cursory oral motor examination reveals structures to be grossly intact for speech and oral feeding.     Pt was viewed sitting upright in the lateral and AP positions. Trials administered were consistent with MBSImP Validated Protocol: Pt was given 20-mL cup sip thin, 40-mL sequential swallow thin, 20-mL cup sip nectar thick, 5-mL honey thick, 5-mL pudding, and ½ cookie coated with 3-mL pudding. Esophageal sweep in lateral view.     Initial view observations/comments: Cervical bony growths      8-Point Penetration-Aspiration Scale   Thin liquid 1 - Material does not enter the airway   Nectar thick liquid 1 - Material does not enter the airway   Honey thick liquid 1 - Material does not enter the airway   Puree (pudding) 1 - Material does not enter the airway   Solid 1 - Material does not enter the airway     Strategies and Efficacy: Dbl swallows (cued) w/ liquid wash appears beneficial in clearing majority of oropharyngeal retention.     Aspiration Response and Efficacy:  NA    MBS IMP Rating    ORAL Impairment  Compinent 1--Lip Closure  Judged at any point during the swallow.  1 - Interlabial escape; no progression to anterior lip    Component 2--Tongue Control During Bolus Hold  Judged on held liquid boluses only and prior to productive tongue movement.   2 - Posterior escape of less than half of bolus    Component 3--Bolus Preparation/Mastication  Judged only during presentation of 1/2 shortbread cookie coated in pudding.   1 - Slow prolonged chewing/mashing with complete re-collection    Component 4--Bolus  Transport/Lingual Motion  Judged after first productive tongue movement for oral bolus transport.  2 - Slowed tongue motion     Component 5--Oral Residue  Judged after first swallow or after the last swallow of the sequential swallow task.  2 - Residue collection on oral structures   Location   B - Palate and C - Tongue    Component 6--Initiation of Pharyngeal Swallow  Judged at first movement of the brisk superior-anterior hyoid trajectory.  3 - Bolus head in pyriforms      PHARYNGEAL Impairment  Component 7--Soft Palate Elevation  Judged during maximum displacement of soft palate.  1 - Trace column of contrast or air between the SP and PW    Component 8--Laryngeal Elevation  Judged when epiglottis is in its most horizontal position.  0 - Complete superior movement of thyroid cartilage with complete approximation of arytenoids to epiglottic petiole    Component 9--Anterior Hyoid Excursion  Judged at height of swallow/maximal anterior hyoid displacement.  0 - Complete anterior movement    Component 10--Epiglottic Movement  Judged at height of swallow/maximal anterior hyoid displacement.  0 - Complete inversion    Component 11--Laryngeal Vestibular Closure  Judged at height of swallow/maximal anterior hyoid displacement.  0 - Complete; no air/contrast in laryngeal vestibule    Component 12--Pharyngeal Stripping Wave  Judged during the full duration of the pharyngeal swallow.  1 - Present - diminished    Component 13--Pharyngeal Contraction  Judged in AP view at rest and throughout maximum movement of structures.  NA Lateral view only 2/2 body habitus/pt position     Component 14--Pharyngoesophageal Segment Opening  Judged during maximum distension of PES and throughout opening and closure.  1 - Partial distension/partial duration; partial obstruction to flow    Component 15--Tongue Base (TB) Retraction  Judged during maximum retraction of the tongue base.  2 - Narrow column of contrast or air between TB and  PW    Component 16--Pharyngeal Residue  Judged after first swallow or after the last swallow of the sequential swallow task.  2 - Collection of residue within or on pharyngeal structures   Location   A - Tongue Base and B - Valleculae      ESOPHAGEAL Impairment  Component 17--Esophageal Clearance Upright Position  Judged in AP view during bolus transit through the oral cavity to the LES  1 - Esophageal retention - limited view         Neetu Duff MS, CCC-SLP  Speech-Language Pathologist  [place] #[id number]  [place] #[id number]

## 2024-05-10 ENCOUNTER — TELEPHONE (OUTPATIENT)
Dept: NEUROLOGY | Facility: CLINIC | Age: 64
End: 2024-05-10

## 2024-05-14 ENCOUNTER — TELEPHONE (OUTPATIENT)
Dept: RADIOLOGY | Facility: CLINIC | Age: 64
End: 2024-05-14

## 2024-05-14 NOTE — TELEPHONE ENCOUNTER
Attempted to contact pt due to missed appt and recent knee surgery, but unable to LM due to mailbox full  LM on Emergency contact,  Danny CHATMAN,  for him to CB

## 2024-05-14 NOTE — TELEPHONE ENCOUNTER
Pt showed up an hour late for appt and would need to be rescheduled  Front staff advised pt that her VM was full and that several offices in [facility] and our office had been trying to reach her

## 2024-05-16 DIAGNOSIS — G35 MULTIPLE SCLEROSIS (HCC): ICD-10-CM

## 2024-05-16 RX ORDER — RENAGEL 800 MG/1
14 TABLET ORAL DAILY
Qty: 30 TABLET | Refills: 6 | Status: SHIPPED | OUTPATIENT
Start: 2024-05-16

## 2024-06-05 ENCOUNTER — TELEPHONE (OUTPATIENT)
Dept: NEUROLOGY | Facility: CLINIC | Age: 64
End: 2024-06-05

## 2024-06-05 NOTE — TELEPHONE ENCOUNTER
Called pt to inform her that the appt she has schedule 9/18 with Dr. Mcintyre needs to be rescheduled with another provider due to Dr. Mcintyre retiring. Her mailbox was full and I was not able to leave a ms

## 2024-06-06 ENCOUNTER — TELEPHONE (OUTPATIENT)
Dept: NEUROLOGY | Facility: CLINIC | Age: 64
End: 2024-06-06

## 2024-06-06 NOTE — TELEPHONE ENCOUNTER
Called to reschedule pt's appt 9/18 with Dr. Mcintyre. He is retiring July 22 and she needs to be scheduled with another provider in the network. I was unable to leave a msg to have her call back I received a message that her mailbox was full. I mailed a letter letting her know to call back to reschedule

## 2024-06-11 ENCOUNTER — HOSPITAL ENCOUNTER (OUTPATIENT)
Dept: RADIOLOGY | Facility: CLINIC | Age: 64
Discharge: HOME | End: 2024-06-11
Payer: MEDICARE

## 2024-06-11 VITALS
HEART RATE: 64 BPM | SYSTOLIC BLOOD PRESSURE: 132 MMHG | DIASTOLIC BLOOD PRESSURE: 54 MMHG | RESPIRATION RATE: 18 BRPM | OXYGEN SATURATION: 98 % | TEMPERATURE: 96.3 F

## 2024-06-11 DIAGNOSIS — M47.812 CERVICAL SPONDYLOSIS: ICD-10-CM

## 2024-06-11 PROCEDURE — 64491 INJ PARAVERT F JNT C/T 2 LEV: CPT | Performed by: STUDENT IN AN ORGANIZED HEALTH CARE EDUCATION/TRAINING PROGRAM

## 2024-06-11 PROCEDURE — 64490 INJ PARAVERT F JNT C/T 1 LEV: CPT | Performed by: STUDENT IN AN ORGANIZED HEALTH CARE EDUCATION/TRAINING PROGRAM

## 2024-06-11 RX ORDER — BUPIVACAINE HCL/PF 2.5 MG/ML
1.5 VIAL (ML) INJECTION ONCE
Status: COMPLETED | OUTPATIENT
Start: 2024-06-11 | End: 2024-06-11

## 2024-06-11 RX ADMIN — Medication 1.5 ML: at 11:00

## 2024-06-11 NOTE — DISCHARGE INSTR - LAB

## 2024-06-11 NOTE — H&P
History of Present Illness: The patient is a 64 y.o. female who presents with complaints of right neck pain    Past Medical History:   Diagnosis Date    Chronic kidney disease     Glaucoma     Hypertension     Knee arthropathy 2023    Left Knee    Migraine     Multiple sclerosis (HCC)     Night muscle spasms     Stroke (HCC)     2 strokes in past, 2011 and 2013       Past Surgical History:   Procedure Laterality Date    ESOPHAGOGASTRODUODENOSCOPY N/A 8/9/2016    Procedure: ESOPHAGOGASTRODUODENOSCOPY (EGD);  Surgeon: Kana Whatley MD;  Location: [facility];  Service:     HIP ARTHROPLASTY Bilateral          Current Outpatient Medications:     atorvastatin (LIPITOR) 40 mg tablet, Take 40 mg by mouth daily, Disp: , Rfl:     Aubagio 14 MG TABS, Take 1 tablet (14 mg total) by mouth in the morning, Disp: 30 tablet, Rfl: 6    chlorthalidone 25 mg tablet, Take 25 mg by mouth daily, Disp: , Rfl:     Cholecalciferol 125 MCG (5000 UT) capsule, Take 5,000 Units by mouth daily (Patient not taking: Reported on 12/8/2023), Disp: , Rfl:     ciprofloxacin (CIPRO) 500 mg tablet, , Disp: , Rfl:     clopidogrel (PLAVIX) 75 mg tablet, Take 75 mg by mouth daily (Patient not taking: Reported on 3/27/2024), Disp: , Rfl:     cyclobenzaprine (FLEXERIL) 10 mg tablet, , Disp: , Rfl:     Eliquis 5 MG, 5 mg twice a day by oral route., Disp: , Rfl:     ferrous sulfate 325 (65 Fe) mg tablet, Take 65 mg by mouth in the morning, Disp: , Rfl:     fluticasone (FLONASE) 50 mcg/act nasal spray, , Disp: , Rfl:     folic acid (FOLVITE) 1 mg tablet, Take 1 mg by mouth daily, Disp: , Rfl:     gabapentin (NEURONTIN) 800 mg tablet, Take 800 mg by mouth 4 (four) times a day, Disp: , Rfl:     hydrALAZINE (APRESOLINE) 50 mg tablet, Take 50 mg by mouth Three times a day, Disp: , Rfl:     LORazepam (ATIVAN) 1 mg tablet, Two p.o. 1 hour before MRI, may repeat 1 after 1 hour p.r.n., Disp: 3 tablet, Rfl: 0    losartan (COZAAR) 50 mg tablet, Take one tablet by mouth  once a day, Disp: , Rfl:     Magnesium Oxide 400 MG CAPS, Take 1 tablet (400 mg total) by mouth in the morning, Disp: 30 capsule, Rfl: 0    meclizine (ANTIVERT) 25 mg tablet, TAKE ONE TABLET BY MOUTH THREE TIMES DAILY AS NEEDED for dizziness, Disp: 30 tablet, Rfl: 3    meloxicam (MOBIC) 15 mg tablet, , Disp: , Rfl:     methocarbamol (ROBAXIN) 500 mg tablet, Take 1 tablet (500 mg total) by mouth 2 (two) times a day as needed for muscle spasms, Disp: 30 tablet, Rfl: 0    NIFEdipine ER (ADALAT CC) 60 MG 24 hr tablet, TAKE TWO TABLETS BY MOUTH EVERY DAY (Patient not taking: Reported on 3/27/2024), Disp: , Rfl:     nitroglycerin (NITROSTAT) 0.4 mg SL tablet, PRN, Disp: , Rfl:     omeprazole (PriLOSEC) 20 mg delayed release capsule, omeprazole 20 mg capsule,delayed release, Disp: , Rfl:     rosuvastatin (CRESTOR) 40 MG tablet, Take 40 mg by mouth daily, Disp: , Rfl:     Toprol XL 50 MG 24 hr tablet, 50 mg by oral route., Disp: , Rfl:     traMADol (Ultram) 50 mg tablet, Take 1 tablet (50 mg total) by mouth 2 (two) times a day as needed for severe pain, Disp: 45 tablet, Rfl: 0    traZODone (DESYREL) 100 mg tablet, , Disp: , Rfl:     Allergies   Allergen Reactions    Acetaminophen Swelling    Aspirin Swelling    Ibuprofen Angioedema and Swelling     Ok if taken with omeprazole    Lisinopril Swelling       Physical Exam: There were no vitals filed for this visit.  General: Awake, Alert, Oriented x 3, Mood and affect appropriate  Respiratory: Respirations even and unlabored  Cardiovascular: Peripheral pulses intact; no edema  Musculoskeletal Exam: ttp right cervical paraspinal region    ASA Score: 3         Assessment:   1. Cervical spondylosis        Plan: Right C3-5 MBB #1

## 2024-06-12 ENCOUNTER — DOCUMENTATION (OUTPATIENT)
Dept: PAIN MEDICINE | Facility: CLINIC | Age: 64
End: 2024-06-12

## 2024-06-13 ENCOUNTER — TELEPHONE (OUTPATIENT)
Dept: RADIOLOGY | Facility: CLINIC | Age: 64
End: 2024-06-13

## 2024-06-13 NOTE — TELEPHONE ENCOUNTER
S/p R C3-5 MBB #1  Pt reports less than 80% relief throughout day    Any recs at this time or schedule f/u ov?

## 2024-07-03 NOTE — PROGRESS NOTES
Pain Medicine Follow-Up Note    Assessment:  1. Cervical spondylosis    2. Stage 3 chronic kidney disease, unspecified whether stage 3a or 3b CKD (HCC)    3. Neck pain        Plan:  Orders Placed This Encounter   Procedures    FL spine and pain procedure     Standing Status:   Future     Standing Expiration Date:   7/5/2028     Order Specific Question:   Reason for Exam:     Answer:   MBB#2 C3-C5 right     Order Specific Question:   Anticoagulant hold needed?     Answer:   no    FL spine and pain procedure     Standing Status:   Future     Standing Expiration Date:   7/5/2028     Order Specific Question:   Reason for Exam:     Answer:   RFA  C3-C5 right     Order Specific Question:   Anticoagulant hold needed?     Answer:   no       New Medications Ordered This Visit   Medications    loratadine (CLARITIN REDITABS) 10 MG dissolvable tablet     Sig: Apply 10 mg to cheek daily       My impressions and treatment recommendations were discussed in detail with the patient who verbalized understanding and had no further questions.      The patient returns to the office today expecting to have medial branch block #2 on her right C3-C5 unfortunately the patient was scheduled with a follow-up visit due to her pain diary not showing that she had pain relief.  Patient reports that she had the same pain relief that she had on the left side and the left side has now already been completed with an RFA and is reporting excellent pain relief.  Patient is stating that she wishes to proceed that the medial branch block #2 of the right C3-C5 since she did have pain relief with MBB#1. Complete risks and benefits including bleeding, infection, tissue reaction, nerve injury and allergic reaction were discussed. The approach was demonstrated using models and literature was provided. Verbal and written consent was obtained.      Follow-up is planned in 6 weeks after RFA time or sooner as warranted.  Discharge instructions were provided. I  personally saw and examined the patient and I agree with the above discussed plan of care.    History of Present Illness:    Molly Villafana is a 64 y.o. female who presents to [facility] for interval re-evaluation of the above stated pain complaints. The patient has a past medical and chronic pain history as outlined in the assessment section. She was last seen on 6/11/2024.    At today's visit patient states that their pain symptoms are the same with a pain score of 8/10 on the verbal numeric pain scale.  The patient's pain is worse in the morning, evening, and at night.  The patient's pain is constant in nature.  And the quality of the patient's pain is described as sharp and pins-and-needles.  The patient's pain is located in the right side of neck.  The patient states the amount of pain relief she is obtaining from her current pain relievers is not enough to make a difference in her life due to it only reducing her pain symptoms by 65%.  Patient is currently using cyclobenzaprine and tramadol sparingly due to the fact that she does not wish to use opioid medications.    Other than as stated above, the patient denies any interval changes in medications, medical condition, mental condition, symptoms, or allergies since the last office visit.         Review of Systems:    Review of Systems   Respiratory:  Positive for shortness of breath.    Cardiovascular:  Negative for chest pain.   Gastrointestinal:  Positive for diarrhea. Negative for constipation, nausea and vomiting.   Musculoskeletal:  Negative for arthralgias, gait problem, joint swelling and myalgias.   Skin:  Negative for rash.   Neurological:  Negative for dizziness, seizures and weakness.   Psychiatric/Behavioral:  Positive for decreased concentration.    All other systems reviewed and are negative.        Past Medical History:   Diagnosis Date    Chronic kidney disease     Glaucoma     Hypertension     Knee arthropathy 2023     Left Knee    Migraine     Multiple sclerosis (HCC)     Night muscle spasms     Stroke (HCC)     2 strokes in past, 2011 and 2013       Past Surgical History:   Procedure Laterality Date    ESOPHAGOGASTRODUODENOSCOPY N/A 8/9/2016    Procedure: ESOPHAGOGASTRODUODENOSCOPY (EGD);  Surgeon: Kana Whatley MD;  Location: [facility];  Service:     HIP ARTHROPLASTY Bilateral        Family History   Problem Relation Age of Onset    No Known Problems Mother     No Known Problems Father        Social History     Occupational History    Not on file   Tobacco Use    Smoking status: Every Day     Current packs/day: 0.00     Average packs/day: 0.3 packs/day for 10.0 years (2.5 ttl pk-yrs)     Types: Cigarettes     Start date: 01/2011     Last attempt to quit: 01/2021     Years since quitting: 3.5    Smokeless tobacco: Never    Tobacco comments:     1 or 2 a week   Vaping Use    Vaping status: Never Used   Substance and Sexual Activity    Alcohol use: Yes     Comment: Rare    Drug use: No    Sexual activity: Not Currently     Partners: Male         Current Outpatient Medications:     atorvastatin (LIPITOR) 40 mg tablet, Take 40 mg by mouth daily, Disp: , Rfl:     Aubagio 14 MG TABS, Take 1 tablet (14 mg total) by mouth in the morning, Disp: 30 tablet, Rfl: 6    chlorthalidone 25 mg tablet, Take 25 mg by mouth daily, Disp: , Rfl:     ciprofloxacin (CIPRO) 500 mg tablet, , Disp: , Rfl:     cyclobenzaprine (FLEXERIL) 10 mg tablet, , Disp: , Rfl:     Eliquis 5 MG, 5 mg twice a day by oral route., Disp: , Rfl:     ferrous sulfate 325 (65 Fe) mg tablet, Take 65 mg by mouth in the morning, Disp: , Rfl:     fluticasone (FLONASE) 50 mcg/act nasal spray, , Disp: , Rfl:     folic acid (FOLVITE) 1 mg tablet, Take 1 mg by mouth daily, Disp: , Rfl:     gabapentin (NEURONTIN) 800 mg tablet, Take 800 mg by mouth 4 (four) times a day, Disp: , Rfl:     hydrALAZINE (APRESOLINE) 50 mg tablet, Take 50 mg by mouth Three times a day, Disp: , Rfl:      "loratadine (CLARITIN REDITABS) 10 MG dissolvable tablet, Apply 10 mg to cheek daily, Disp: , Rfl:     LORazepam (ATIVAN) 1 mg tablet, Two p.o. 1 hour before MRI, may repeat 1 after 1 hour p.r.n., Disp: 3 tablet, Rfl: 0    losartan (COZAAR) 50 mg tablet, Take one tablet by mouth once a day, Disp: , Rfl:     Magnesium Oxide 400 MG CAPS, Take 1 tablet (400 mg total) by mouth in the morning, Disp: 30 capsule, Rfl: 0    meclizine (ANTIVERT) 25 mg tablet, TAKE ONE TABLET BY MOUTH THREE TIMES DAILY AS NEEDED for dizziness, Disp: 30 tablet, Rfl: 3    nitroglycerin (NITROSTAT) 0.4 mg SL tablet, PRN, Disp: , Rfl:     omeprazole (PriLOSEC) 20 mg delayed release capsule, omeprazole 20 mg capsule,delayed release, Disp: , Rfl:     rosuvastatin (CRESTOR) 40 MG tablet, Take 40 mg by mouth daily, Disp: , Rfl:     Toprol XL 50 MG 24 hr tablet, 50 mg by oral route., Disp: , Rfl:     traMADol (Ultram) 50 mg tablet, Take 1 tablet (50 mg total) by mouth 2 (two) times a day as needed for severe pain, Disp: 45 tablet, Rfl: 0    traZODone (DESYREL) 100 mg tablet, , Disp: , Rfl:     Cholecalciferol 125 MCG (5000 UT) capsule, Take 5,000 Units by mouth daily (Patient not taking: Reported on 12/8/2023), Disp: , Rfl:     clopidogrel (PLAVIX) 75 mg tablet, Take 75 mg by mouth daily (Patient not taking: Reported on 3/27/2024), Disp: , Rfl:     meloxicam (MOBIC) 15 mg tablet, , Disp: , Rfl:     NIFEdipine ER (ADALAT CC) 60 MG 24 hr tablet, TAKE TWO TABLETS BY MOUTH EVERY DAY (Patient not taking: Reported on 3/27/2024), Disp: , Rfl:     Allergies   Allergen Reactions    Acetaminophen Swelling    Aspirin Swelling    Ibuprofen Angioedema and Swelling     Ok if taken with omeprazole    Lisinopril Swelling       Physical Exam:    /96   Pulse 74   Ht 5' 11\" (1.803 m)   Wt 85.3 kg (188 lb)   BMI 26.22 kg/m²     Constitutional:normal, well developed, well nourished, alert, in no distress and non-toxic and no overt pain " behavior.  Eyes:anicteric  HEENT:grossly intact  Neck: Limited range of motion, stiffness  Pulmonary:even and unlabored  Cardiovascular:No edema or pitting edema present  Skin:Normal without rashes or lesions and well hydrated  Psychiatric:Mood and affect appropriate  Neurologic:Cranial Nerves II-XII grossly intact  Musculoskeletal:antalgic gait (recent left knee replacement)      Imaging  FL spine and pain procedure    (Results Pending)   FL spine and pain procedure    (Results Pending)         Orders Placed This Encounter   Procedures    FL spine and pain procedure    FL spine and pain procedure       This document was created using speech voice recognition software.   Grammatical errors, random word insertions, pronoun errors, and incomplete sentences are an occasional consequence of this system due to software limitations, ambient noise, and hardware issues.   Any formal questions or concerns about content, text, or information contained within the body of this dictation should be directly addressed to the provider for clarification.

## 2024-07-03 NOTE — H&P (VIEW-ONLY)
Pain Medicine Follow-Up Note    Assessment:  1. Cervical spondylosis    2. Stage 3 chronic kidney disease, unspecified whether stage 3a or 3b CKD (HCC)    3. Neck pain        Plan:  Orders Placed This Encounter   Procedures    FL spine and pain procedure     Standing Status:   Future     Standing Expiration Date:   7/5/2028     Order Specific Question:   Reason for Exam:     Answer:   MBB#2 C3-C5 right     Order Specific Question:   Anticoagulant hold needed?     Answer:   no    FL spine and pain procedure     Standing Status:   Future     Standing Expiration Date:   7/5/2028     Order Specific Question:   Reason for Exam:     Answer:   RFA  C3-C5 right     Order Specific Question:   Anticoagulant hold needed?     Answer:   no       New Medications Ordered This Visit   Medications    loratadine (CLARITIN REDITABS) 10 MG dissolvable tablet     Sig: Apply 10 mg to cheek daily       My impressions and treatment recommendations were discussed in detail with the patient who verbalized understanding and had no further questions.      The patient returns to the office today expecting to have medial branch block #2 on her right C3-C5 unfortunately the patient was scheduled with a follow-up visit due to her pain diary not showing that she had pain relief.  Patient reports that she had the same pain relief that she had on the left side and the left side has now already been completed with an RFA and is reporting excellent pain relief.  Patient is stating that she wishes to proceed that the medial branch block #2 of the right C3-C5 since she did have pain relief with MBB#1. Complete risks and benefits including bleeding, infection, tissue reaction, nerve injury and allergic reaction were discussed. The approach was demonstrated using models and literature was provided. Verbal and written consent was obtained.      Follow-up is planned in 6 weeks after RFA time or sooner as warranted.  Discharge instructions were provided. I  personally saw and examined the patient and I agree with the above discussed plan of care.    History of Present Illness:    Molly Villafana is a 64 y.o. female who presents to [facility] for interval re-evaluation of the above stated pain complaints. The patient has a past medical and chronic pain history as outlined in the assessment section. She was last seen on 6/11/2024.    At today's visit patient states that their pain symptoms are the same with a pain score of 8/10 on the verbal numeric pain scale.  The patient's pain is worse in the morning, evening, and at night.  The patient's pain is constant in nature.  And the quality of the patient's pain is described as sharp and pins-and-needles.  The patient's pain is located in the right side of neck.  The patient states the amount of pain relief she is obtaining from her current pain relievers is not enough to make a difference in her life due to it only reducing her pain symptoms by 65%.  Patient is currently using cyclobenzaprine and tramadol sparingly due to the fact that she does not wish to use opioid medications.    Other than as stated above, the patient denies any interval changes in medications, medical condition, mental condition, symptoms, or allergies since the last office visit.         Review of Systems:    Review of Systems   Respiratory:  Positive for shortness of breath.    Cardiovascular:  Negative for chest pain.   Gastrointestinal:  Positive for diarrhea. Negative for constipation, nausea and vomiting.   Musculoskeletal:  Negative for arthralgias, gait problem, joint swelling and myalgias.   Skin:  Negative for rash.   Neurological:  Negative for dizziness, seizures and weakness.   Psychiatric/Behavioral:  Positive for decreased concentration.    All other systems reviewed and are negative.        Past Medical History:   Diagnosis Date    Chronic kidney disease     Glaucoma     Hypertension     Knee arthropathy 2023     Left Knee    Migraine     Multiple sclerosis (HCC)     Night muscle spasms     Stroke (HCC)     2 strokes in past, 2011 and 2013       Past Surgical History:   Procedure Laterality Date    ESOPHAGOGASTRODUODENOSCOPY N/A 8/9/2016    Procedure: ESOPHAGOGASTRODUODENOSCOPY (EGD);  Surgeon: Kana Whatley MD;  Location: [facility];  Service:     HIP ARTHROPLASTY Bilateral        Family History   Problem Relation Age of Onset    No Known Problems Mother     No Known Problems Father        Social History     Occupational History    Not on file   Tobacco Use    Smoking status: Every Day     Current packs/day: 0.00     Average packs/day: 0.3 packs/day for 10.0 years (2.5 ttl pk-yrs)     Types: Cigarettes     Start date: 01/2011     Last attempt to quit: 01/2021     Years since quitting: 3.5    Smokeless tobacco: Never    Tobacco comments:     1 or 2 a week   Vaping Use    Vaping status: Never Used   Substance and Sexual Activity    Alcohol use: Yes     Comment: Rare    Drug use: No    Sexual activity: Not Currently     Partners: Male         Current Outpatient Medications:     atorvastatin (LIPITOR) 40 mg tablet, Take 40 mg by mouth daily, Disp: , Rfl:     Aubagio 14 MG TABS, Take 1 tablet (14 mg total) by mouth in the morning, Disp: 30 tablet, Rfl: 6    chlorthalidone 25 mg tablet, Take 25 mg by mouth daily, Disp: , Rfl:     ciprofloxacin (CIPRO) 500 mg tablet, , Disp: , Rfl:     cyclobenzaprine (FLEXERIL) 10 mg tablet, , Disp: , Rfl:     Eliquis 5 MG, 5 mg twice a day by oral route., Disp: , Rfl:     ferrous sulfate 325 (65 Fe) mg tablet, Take 65 mg by mouth in the morning, Disp: , Rfl:     fluticasone (FLONASE) 50 mcg/act nasal spray, , Disp: , Rfl:     folic acid (FOLVITE) 1 mg tablet, Take 1 mg by mouth daily, Disp: , Rfl:     gabapentin (NEURONTIN) 800 mg tablet, Take 800 mg by mouth 4 (four) times a day, Disp: , Rfl:     hydrALAZINE (APRESOLINE) 50 mg tablet, Take 50 mg by mouth Three times a day, Disp: , Rfl:      "loratadine (CLARITIN REDITABS) 10 MG dissolvable tablet, Apply 10 mg to cheek daily, Disp: , Rfl:     LORazepam (ATIVAN) 1 mg tablet, Two p.o. 1 hour before MRI, may repeat 1 after 1 hour p.r.n., Disp: 3 tablet, Rfl: 0    losartan (COZAAR) 50 mg tablet, Take one tablet by mouth once a day, Disp: , Rfl:     Magnesium Oxide 400 MG CAPS, Take 1 tablet (400 mg total) by mouth in the morning, Disp: 30 capsule, Rfl: 0    meclizine (ANTIVERT) 25 mg tablet, TAKE ONE TABLET BY MOUTH THREE TIMES DAILY AS NEEDED for dizziness, Disp: 30 tablet, Rfl: 3    nitroglycerin (NITROSTAT) 0.4 mg SL tablet, PRN, Disp: , Rfl:     omeprazole (PriLOSEC) 20 mg delayed release capsule, omeprazole 20 mg capsule,delayed release, Disp: , Rfl:     rosuvastatin (CRESTOR) 40 MG tablet, Take 40 mg by mouth daily, Disp: , Rfl:     Toprol XL 50 MG 24 hr tablet, 50 mg by oral route., Disp: , Rfl:     traMADol (Ultram) 50 mg tablet, Take 1 tablet (50 mg total) by mouth 2 (two) times a day as needed for severe pain, Disp: 45 tablet, Rfl: 0    traZODone (DESYREL) 100 mg tablet, , Disp: , Rfl:     Cholecalciferol 125 MCG (5000 UT) capsule, Take 5,000 Units by mouth daily (Patient not taking: Reported on 12/8/2023), Disp: , Rfl:     clopidogrel (PLAVIX) 75 mg tablet, Take 75 mg by mouth daily (Patient not taking: Reported on 3/27/2024), Disp: , Rfl:     meloxicam (MOBIC) 15 mg tablet, , Disp: , Rfl:     NIFEdipine ER (ADALAT CC) 60 MG 24 hr tablet, TAKE TWO TABLETS BY MOUTH EVERY DAY (Patient not taking: Reported on 3/27/2024), Disp: , Rfl:     Allergies   Allergen Reactions    Acetaminophen Swelling    Aspirin Swelling    Ibuprofen Angioedema and Swelling     Ok if taken with omeprazole    Lisinopril Swelling       Physical Exam:    /96   Pulse 74   Ht 5' 11\" (1.803 m)   Wt 85.3 kg (188 lb)   BMI 26.22 kg/m²     Constitutional:normal, well developed, well nourished, alert, in no distress and non-toxic and no overt pain " behavior.  Eyes:anicteric  HEENT:grossly intact  Neck: Limited range of motion, stiffness  Pulmonary:even and unlabored  Cardiovascular:No edema or pitting edema present  Skin:Normal without rashes or lesions and well hydrated  Psychiatric:Mood and affect appropriate  Neurologic:Cranial Nerves II-XII grossly intact  Musculoskeletal:antalgic gait (recent left knee replacement)      Imaging  FL spine and pain procedure    (Results Pending)   FL spine and pain procedure    (Results Pending)         Orders Placed This Encounter   Procedures    FL spine and pain procedure    FL spine and pain procedure       This document was created using speech voice recognition software.   Grammatical errors, random word insertions, pronoun errors, and incomplete sentences are an occasional consequence of this system due to software limitations, ambient noise, and hardware issues.   Any formal questions or concerns about content, text, or information contained within the body of this dictation should be directly addressed to the provider for clarification.

## 2024-07-05 ENCOUNTER — TELEPHONE (OUTPATIENT)
Dept: NEUROLOGY | Facility: CLINIC | Age: 64
End: 2024-07-05

## 2024-07-05 ENCOUNTER — OFFICE VISIT (OUTPATIENT)
Dept: PAIN MEDICINE | Facility: CLINIC | Age: 64
End: 2024-07-05

## 2024-07-05 VITALS
BODY MASS INDEX: 26.32 KG/M2 | HEIGHT: 71 IN | SYSTOLIC BLOOD PRESSURE: 141 MMHG | WEIGHT: 188 LBS | HEART RATE: 74 BPM | DIASTOLIC BLOOD PRESSURE: 96 MMHG

## 2024-07-05 DIAGNOSIS — N18.30 STAGE 3 CHRONIC KIDNEY DISEASE, UNSPECIFIED WHETHER STAGE 3A OR 3B CKD (HCC): ICD-10-CM

## 2024-07-05 DIAGNOSIS — M54.2 NECK PAIN: ICD-10-CM

## 2024-07-05 DIAGNOSIS — M47.812 CERVICAL SPONDYLOSIS: Primary | ICD-10-CM

## 2024-07-05 PROBLEM — I73.9 PERIPHERAL VASCULAR DISEASE (HCC): Status: ACTIVE | Noted: 2024-07-05

## 2024-07-05 PROBLEM — F33.42 RECURRENT MAJOR DEPRESSIVE DISORDER, IN FULL REMISSION (HCC): Status: ACTIVE | Noted: 2024-07-05

## 2024-07-05 RX ORDER — LORATADINE 10 MG/1
10 TABLET, ORALLY DISINTEGRATING ORAL DAILY
COMMUNITY
Start: 2024-02-29

## 2024-07-05 NOTE — TELEPHONE ENCOUNTER
Pateint came into office to reschedule 9/24 appt that was scheduled with Dr Miguelito Mcintyre.  DX: MS  Appt set with Dr Powers for 9/16/24 @ 1 pm in [place].

## 2024-07-11 ENCOUNTER — TELEPHONE (OUTPATIENT)
Dept: RADIOLOGY | Facility: CLINIC | Age: 64
End: 2024-07-11

## 2024-07-11 NOTE — TELEPHONE ENCOUNTER
Pt scheduled for MBB #2 with Dr Brothers on 7/30/24 and RFA on 8/13/24     Pt is not diabetic and does not have a pacemaker or defibrillator.    Pt reports she does have a loop recorder.    No prescription medication holds needed for MBB or RFA     Instructions reviewed with pt in office, no myc for follow up message.     Have you completed PT/HEP/Chiro in the past 6 months for dedicated area? no  If yes, how long did you complete?  What was the frequency?  Did it provide relief?  If no, reason therapy was not completed? Pt has completed successful MBB's and RFA for same level, left side, as of 4/9/24 -- pt has also tried medications for pain relief

## 2024-07-16 NOTE — TELEPHONE ENCOUNTER
No contraindication but she will have to note time of RFA in case there is some interference recorded on the device.

## 2024-07-30 ENCOUNTER — HOSPITAL ENCOUNTER (OUTPATIENT)
Dept: RADIOLOGY | Facility: CLINIC | Age: 64
Discharge: HOME | End: 2024-07-30
Payer: MEDICARE

## 2024-07-30 VITALS
RESPIRATION RATE: 20 BRPM | SYSTOLIC BLOOD PRESSURE: 147 MMHG | HEART RATE: 51 BPM | TEMPERATURE: 97.6 F | DIASTOLIC BLOOD PRESSURE: 84 MMHG | OXYGEN SATURATION: 95 %

## 2024-07-30 DIAGNOSIS — M47.812 CERVICAL SPONDYLOSIS: ICD-10-CM

## 2024-07-30 PROCEDURE — 64490 INJ PARAVERT F JNT C/T 1 LEV: CPT | Performed by: STUDENT IN AN ORGANIZED HEALTH CARE EDUCATION/TRAINING PROGRAM

## 2024-07-30 PROCEDURE — 64491 INJ PARAVERT F JNT C/T 2 LEV: CPT | Performed by: STUDENT IN AN ORGANIZED HEALTH CARE EDUCATION/TRAINING PROGRAM

## 2024-07-30 RX ORDER — BUPIVACAINE HYDROCHLORIDE 7.5 MG/ML
1.5 INJECTION, SOLUTION EPIDURAL; RETROBULBAR ONCE
Status: COMPLETED | OUTPATIENT
Start: 2024-07-30 | End: 2024-07-30

## 2024-07-30 RX ADMIN — BUPIVACAINE HYDROCHLORIDE 1.5 ML: 7.5 INJECTION, SOLUTION EPIDURAL; RETROBULBAR at 11:57

## 2024-07-30 NOTE — INTERVAL H&P NOTE
Update: (This section must be completed if the H&P was completed greater than 24 hrs to procedure or admission)    H&P reviewed. After examining the patient, I find no changed to the H&P since it had been written.    Patient re-evaluated. Accept as history and physical.    Librado Brothers MD/July 30, 2024/11:48 AM

## 2024-07-30 NOTE — DISCHARGE INSTR - LAB

## 2024-08-13 ENCOUNTER — TELEPHONE (OUTPATIENT)
Dept: RADIOLOGY | Facility: CLINIC | Age: 64
End: 2024-08-13

## 2024-08-13 ENCOUNTER — HOSPITAL ENCOUNTER (OUTPATIENT)
Dept: RADIOLOGY | Facility: CLINIC | Age: 64
Discharge: HOME | End: 2024-08-13
Admitting: STUDENT IN AN ORGANIZED HEALTH CARE EDUCATION/TRAINING PROGRAM
Payer: MEDICARE

## 2024-08-13 VITALS
SYSTOLIC BLOOD PRESSURE: 134 MMHG | DIASTOLIC BLOOD PRESSURE: 99 MMHG | OXYGEN SATURATION: 98 % | RESPIRATION RATE: 18 BRPM | HEART RATE: 58 BPM

## 2024-08-13 DIAGNOSIS — M47.812 CERVICAL SPONDYLOSIS: ICD-10-CM

## 2024-08-13 PROCEDURE — 64633 DESTROY CERV/THOR FACET JNT: CPT | Performed by: STUDENT IN AN ORGANIZED HEALTH CARE EDUCATION/TRAINING PROGRAM

## 2024-08-13 PROCEDURE — 64634 DESTROY C/TH FACET JNT ADDL: CPT | Performed by: STUDENT IN AN ORGANIZED HEALTH CARE EDUCATION/TRAINING PROGRAM

## 2024-08-13 RX ADMIN — Medication 3 ML: at 11:18

## 2024-08-13 NOTE — DISCHARGE INSTR - LAB

## 2024-08-13 NOTE — H&P
History of Present Illness: The patient is a 64 y.o. female who presents with complaints of right neck pain    Past Medical History:   Diagnosis Date    Chronic kidney disease     Glaucoma     Hypertension     Knee arthropathy 2023    Left Knee    Migraine     Multiple sclerosis (HCC)     Night muscle spasms     Stroke (HCC)     2 strokes in past, 2011 and 2013       Past Surgical History:   Procedure Laterality Date    ESOPHAGOGASTRODUODENOSCOPY N/A 8/9/2016    Procedure: ESOPHAGOGASTRODUODENOSCOPY (EGD);  Surgeon: Kana Whatley MD;  Location: [facility];  Service:     HIP ARTHROPLASTY Bilateral          Current Outpatient Medications:     atorvastatin (LIPITOR) 40 mg tablet, Take 40 mg by mouth daily, Disp: , Rfl:     Aubagio 14 MG TABS, Take 1 tablet (14 mg total) by mouth in the morning, Disp: 30 tablet, Rfl: 6    chlorthalidone 25 mg tablet, Take 25 mg by mouth daily, Disp: , Rfl:     Cholecalciferol 125 MCG (5000 UT) capsule, Take 5,000 Units by mouth daily (Patient not taking: Reported on 12/8/2023), Disp: , Rfl:     ciprofloxacin (CIPRO) 500 mg tablet, , Disp: , Rfl:     clopidogrel (PLAVIX) 75 mg tablet, Take 75 mg by mouth daily (Patient not taking: Reported on 3/27/2024), Disp: , Rfl:     cyclobenzaprine (FLEXERIL) 10 mg tablet, , Disp: , Rfl:     Eliquis 5 MG, 5 mg twice a day by oral route., Disp: , Rfl:     ferrous sulfate 325 (65 Fe) mg tablet, Take 65 mg by mouth in the morning, Disp: , Rfl:     fluticasone (FLONASE) 50 mcg/act nasal spray, , Disp: , Rfl:     folic acid (FOLVITE) 1 mg tablet, Take 1 mg by mouth daily, Disp: , Rfl:     gabapentin (NEURONTIN) 800 mg tablet, Take 800 mg by mouth 4 (four) times a day, Disp: , Rfl:     hydrALAZINE (APRESOLINE) 50 mg tablet, Take 50 mg by mouth Three times a day, Disp: , Rfl:     loratadine (CLARITIN REDITABS) 10 MG dissolvable tablet, Apply 10 mg to cheek daily, Disp: , Rfl:     LORazepam (ATIVAN) 1 mg tablet, Two p.o. 1 hour before MRI, may repeat 1 after  1 hour p.r.n., Disp: 3 tablet, Rfl: 0    losartan (COZAAR) 50 mg tablet, Take one tablet by mouth once a day, Disp: , Rfl:     Magnesium Oxide 400 MG CAPS, Take 1 tablet (400 mg total) by mouth in the morning, Disp: 30 capsule, Rfl: 0    meclizine (ANTIVERT) 25 mg tablet, TAKE ONE TABLET BY MOUTH THREE TIMES DAILY AS NEEDED for dizziness, Disp: 30 tablet, Rfl: 3    meloxicam (MOBIC) 15 mg tablet, , Disp: , Rfl:     NIFEdipine ER (ADALAT CC) 60 MG 24 hr tablet, TAKE TWO TABLETS BY MOUTH EVERY DAY (Patient not taking: Reported on 3/27/2024), Disp: , Rfl:     nitroglycerin (NITROSTAT) 0.4 mg SL tablet, PRN, Disp: , Rfl:     omeprazole (PriLOSEC) 20 mg delayed release capsule, omeprazole 20 mg capsule,delayed release, Disp: , Rfl:     rosuvastatin (CRESTOR) 40 MG tablet, Take 40 mg by mouth daily, Disp: , Rfl:     Toprol XL 50 MG 24 hr tablet, 50 mg by oral route., Disp: , Rfl:     traMADol (Ultram) 50 mg tablet, Take 1 tablet (50 mg total) by mouth 2 (two) times a day as needed for severe pain, Disp: 45 tablet, Rfl: 0    traZODone (DESYREL) 100 mg tablet, , Disp: , Rfl:     Allergies   Allergen Reactions    Acetaminophen Swelling    Aspirin Swelling    Ibuprofen Angioedema and Swelling     Ok if taken with omeprazole    Lisinopril Swelling       Physical Exam: There were no vitals filed for this visit.  General: Awake, Alert, Oriented x 3, Mood and affect appropriate  Respiratory: Respirations even and unlabored  Cardiovascular: Peripheral pulses intact; no edema  Musculoskeletal Exam: ttp right cervical facet joints    ASA Score: 3         Assessment:   1. Cervical spondylosis        Plan: RFA  C3-C5 right

## 2024-08-15 ENCOUNTER — TELEPHONE (OUTPATIENT)
Dept: NEUROLOGY | Facility: CLINIC | Age: 64
End: 2024-08-15

## 2024-08-15 DIAGNOSIS — G35 MULTIPLE SCLEROSIS (HCC): ICD-10-CM

## 2024-08-19 ENCOUNTER — TELEPHONE (OUTPATIENT)
Dept: NEUROLOGY | Facility: CLINIC | Age: 64
End: 2024-08-19

## 2024-08-19 NOTE — TELEPHONE ENCOUNTER
"Inbound call from [facility] to make us aware Torito needs prior authorization and it appears it should be in \"cover my meds\" already.     Team please follow up on prior auth, thank you!  "

## 2024-08-21 ENCOUNTER — TELEPHONE (OUTPATIENT)
Dept: NEUROLOGY | Facility: CLINIC | Age: 64
End: 2024-08-21

## 2024-08-22 ENCOUNTER — TELEPHONE (OUTPATIENT)
Dept: NEUROLOGY | Facility: CLINIC | Age: 64
End: 2024-08-22

## 2024-08-22 NOTE — TELEPHONE ENCOUNTER
Dian Mcnally LPN  Licensed Practical Nurse Signed 11:14 AM     Copy     As per Vladimir, sent message for the refill to [facility] and [facility]rology Team 3 for Aubagio 14 mg tab refill.

## 2024-08-22 NOTE — TELEPHONE ENCOUNTER
As per Vladimir, sent message for the refill to [facility] and [facility] 3 for Aubagio 14 mg tab refill.

## 2024-08-22 NOTE — TELEPHONE ENCOUNTER
Patient came to office inquiring about her Aubagio 14 mg PO one qd in mornings.   She needs prescription filled asap.  She took  the last one today.  A prior authorization was sent to the neurology prior authorization pool #[id number] on 8/19/24 to refill. It is sent to [facility] that mails it to her but she will need medication sooner.

## 2024-08-22 NOTE — TELEPHONE ENCOUNTER
DUPLICATE ENCOUNTER - DO NOT DOCUMENT    SEE 8/15/24 TELEPHONE ENCOUNTER    PA FOR AUBAGIO 14MG WAS ALREADY APPROVED.    Script for Aubagio 14mg for a 7 month supply sent to [facility] on 5/16/24.

## 2024-08-22 NOTE — TELEPHONE ENCOUNTER
Called [facility] [phone]. Spoke w/Nadine. She confirmed the pharmacy spoke w/the pt this AM at 9:42am. Delivery was scheduled for tomorrow. Med will ship out today.    Aubagio 14mg was initially scheduled for delivery by patient for 8/19/24 (scheduled on 8/14/24).  However, pharmacy is moving actual physical location (started on 8/19/24) and this may be why shipment was not sent at that time.

## 2024-08-22 NOTE — TELEPHONE ENCOUNTER
Torito MONTOYA approved through 12/31/24. Please see below:    - Outcome:  Approved on August 21 by [employer] 2017 NCPDP  Request Reference Number: [id number]. AUBAGIO TAB 14MG is approved through 12/31/2024. Your patient may now fill this prescription and it will be covered.  Authorization Expiration Date: 12/31/2024.    Called pharmacy to make aware and spoke with Silvia, who stated that the claim went through.

## 2024-08-22 NOTE — TELEPHONE ENCOUNTER
Dian Mcnally LPN  Licensed Practical Nurse Signed 10:51 AM     Copy     Patient came to office inquiring about her Aubagio 14 mg PO one qd in mornings.   She needs prescription filled asap.  She took  the last one today.  A prior authorization was sent to the neurology prior authorization pool #[id number] on 8/19/24 to refill. It is sent to [facility] that mails it to her but she will need medication sooner.

## 2024-11-01 ENCOUNTER — TELEPHONE (OUTPATIENT)
Dept: NEPHROLOGY | Facility: CLINIC | Age: 64
End: 2024-11-01

## 2024-11-01 NOTE — TELEPHONE ENCOUNTER
Called spoke with patient about moving her appointment up on 11/21/24 from 11:30am to 8:40am .  patient excepted and confirmed.

## 2024-11-11 ENCOUNTER — TELEPHONE (OUTPATIENT)
Dept: NEPHROLOGY | Facility: CLINIC | Age: 64
End: 2024-11-11

## 2024-11-11 DIAGNOSIS — N18.30 STAGE 3 CHRONIC KIDNEY DISEASE, UNSPECIFIED WHETHER STAGE 3A OR 3B CKD (HCC): Primary | ICD-10-CM

## 2024-11-11 NOTE — TELEPHONE ENCOUNTER
Called pt to advise her to get labs done prior to 11/21 appt. Was unable to LM the voice mail box was not set up yet.

## 2024-11-20 ENCOUNTER — TELEPHONE (OUTPATIENT)
Dept: NEPHROLOGY | Facility: CLINIC | Age: 64
End: 2024-11-20

## 2024-11-20 NOTE — TELEPHONE ENCOUNTER
I called patient to to remind patient to have lab work done prior to appointment unable to leave a message nor speak with patient. Phone number is not available.

## 2024-11-21 ENCOUNTER — OFFICE VISIT (OUTPATIENT)
Dept: NEPHROLOGY | Facility: CLINIC | Age: 64
End: 2024-11-21
Payer: MEDICARE

## 2024-11-21 VITALS
RESPIRATION RATE: 16 BRPM | BODY MASS INDEX: 23.52 KG/M2 | HEIGHT: 71 IN | WEIGHT: 168 LBS | TEMPERATURE: 69.9 F | OXYGEN SATURATION: 98 % | HEART RATE: 50 BPM

## 2024-11-21 DIAGNOSIS — N18.9 CHRONIC KIDNEY DISEASE-MINERAL AND BONE DISORDER: ICD-10-CM

## 2024-11-21 DIAGNOSIS — E27.8 ADRENAL MASS, RIGHT (HCC): ICD-10-CM

## 2024-11-21 DIAGNOSIS — M89.9 CHRONIC KIDNEY DISEASE-MINERAL AND BONE DISORDER: ICD-10-CM

## 2024-11-21 DIAGNOSIS — I10 ESSENTIAL HYPERTENSION: Chronic | ICD-10-CM

## 2024-11-21 DIAGNOSIS — I27.20 PULMONARY HYPERTENSION (HCC): ICD-10-CM

## 2024-11-21 DIAGNOSIS — N18.2 STAGE 2 CHRONIC KIDNEY DISEASE: ICD-10-CM

## 2024-11-21 DIAGNOSIS — R80.1 PERSISTENT PROTEINURIA: Primary | ICD-10-CM

## 2024-11-21 DIAGNOSIS — G35 MULTIPLE SCLEROSIS (HCC): ICD-10-CM

## 2024-11-21 DIAGNOSIS — E83.9 CHRONIC KIDNEY DISEASE-MINERAL AND BONE DISORDER: ICD-10-CM

## 2024-11-21 PROCEDURE — 99214 OFFICE O/P EST MOD 30 MIN: CPT | Performed by: INTERNAL MEDICINE

## 2024-11-21 NOTE — PROGRESS NOTES
Name: Molly Villafana      : 1960      MRN: [id number]  Encounter Provider: Agapito Ambriz MD  Encounter Date: 2024   Encounter department: [facility]  :  Assessment & Plan  Persistent proteinuria  Patient does have worsening proteinuria.  Etiology is unclear.  Does have hypertension though is well-controlled.  I will repeat workup for proteinuria to rule out any sort of collagen vascular disease or autoimmune disease.  Patient was in [facility] workup has been negative for repeated.  Will monitor patient closely at this point       Essential hypertension  Pressure reasonable well-controlled.  Does have adrenal nodule for workup for any sort of secondary hypertension has been negative in the past.  Patient has seen endocrinologist also       Multiple sclerosis (HCC)  Stable and being monitored by neurologist on medication       Chronic kidney disease-mineral and bone disorder  Lab Results   Component Value Date    EGFR 72 10/25/2024    EGFR 59 (L) 10/07/2024    EGFR 61 10/02/2024    CREATININE 0.89 10/25/2024    CREATININE 1.05 10/07/2024    CREATININE 1.02 10/02/2024   We will check PTH and phosphorus along with vitamin D         Stage 2 chronic kidney disease  Lab Results   Component Value Date    EGFR 72 10/25/2024    EGFR 59 (L) 10/07/2024    EGFR 61 10/02/2024    CREATININE 0.89 10/25/2024    CREATININE 1.05 10/07/2024    CREATININE 1.02 10/02/2024     Does have proteinuria but GFR is normal       Adrenal mass, right (HCC)  Workup for any functional adrenal mass has been negative and patient being monitored by endocrinologist       Discussed everything at length with the patient including proteinuria.  I will recheck the blood work and workup for proteinuria in 4 months and see her back at that    History of Present Illness     HPI  Molly Villafana is a 64 y.o. female who presents CKD and proteinuria    Overall doing well    Feeling tired but no other  "acute complaint    No chest pain no palpitation    Has been hospitalized couple of times with syncopal episode with negative cardiac and neurowork-up    Denies any urinary complaint    Denies any hematuria      Review of Systems       Objective   Pulse (!) 50   Temp (!) 69.9 °F (21.1 °C) (Temporal)   Resp 16   Ht 5' 11\" (1.803 m)   Wt 76.2 kg (168 lb)   SpO2 98%   BMI 23.43 kg/m²      Physical Exam      "

## 2024-11-21 NOTE — ASSESSMENT & PLAN NOTE
Pressure reasonable well-controlled.  Does have adrenal nodule for workup for any sort of secondary hypertension has been negative in the past.  Patient has seen endocrinologist also

## 2024-11-21 NOTE — ASSESSMENT & PLAN NOTE
Workup for any functional adrenal mass has been negative and patient being monitored by endocrinologist

## 2024-11-21 NOTE — ASSESSMENT & PLAN NOTE
Patient does have worsening proteinuria.  Etiology is unclear.  Does have hypertension though is well-controlled.  I will repeat workup for proteinuria to rule out any sort of collagen vascular disease or autoimmune disease.  Patient was in [facility] workup has been negative for repeated.  Will monitor patient closely at this point

## 2024-11-21 NOTE — ASSESSMENT & PLAN NOTE
Lab Results   Component Value Date    EGFR 72 10/25/2024    EGFR 59 (L) 10/07/2024    EGFR 61 10/02/2024    CREATININE 0.89 10/25/2024    CREATININE 1.05 10/07/2024    CREATININE 1.02 10/02/2024     Does have proteinuria but GFR is normal

## 2024-11-21 NOTE — ASSESSMENT & PLAN NOTE
Lab Results   Component Value Date    EGFR 72 10/25/2024    EGFR 59 (L) 10/07/2024    EGFR 61 10/02/2024    CREATININE 0.89 10/25/2024    CREATININE 1.05 10/07/2024    CREATININE 1.02 10/02/2024   We will check PTH and phosphorus along with vitamin D

## 2024-12-12 ENCOUNTER — TELEPHONE (OUTPATIENT)
Age: 64
End: 2024-12-12

## 2024-12-12 DIAGNOSIS — G35 MULTIPLE SCLEROSIS (HCC): ICD-10-CM

## 2024-12-12 NOTE — TELEPHONE ENCOUNTER
"Inbound call received from Vanessa with [facility].     Vanessa stated that pt called the pharmacy requesting a medication refill for the Aubagio 14 mg tablets and that pt only had 3 tablets left.     Vanessa stated that a new Rx for Aubagio 14 mg tablets needs to be sent to the pharmacy. The medication was refilled for pt on 10/14/24 by Arlen Corona for a 30 day supply and was last filled by Dena on 10/21/24 for the pt with no refills.     Pharmacy tried calling pt but unable to get in touch with pt to clarify if pt filled the medication at another pharmacy for month of November or if pt missed doses or has been taking the medication differently.     Per chart review, office had attempted multiple times to contact pt to discuss medication and discuss scheduling pt with another provider. Pt no showed for 9/16/24 appt with Dr. Powers.       RN called patient at number listed in chart. Phone rang and then disconnected. RN tried calling again but received the message \"the caller you are trying to reach is not accepting calls at this time.\"    No communication form on file no emergency contact listed in patient contacts. Pt is not active in the portal.     pt prior pt of Dr. Mcintyre- per telephone encounter dated 7/5/24 pt scheduled for NP appt on 9/16/24 with you. Pt was a No Show.   "

## 2024-12-12 NOTE — TELEPHONE ENCOUNTER
"Clerical Team - can you please mail \"Unable to Reach You\" letter to pt at home address on file? Need to speak w/pt re: medication and to schedule an appt.   Thank you!  "

## 2024-12-13 NOTE — TELEPHONE ENCOUNTER
Unable to reach patient letter printed and placed in mail today to patient address on file in chart.

## 2024-12-18 RX ORDER — RENAGEL 800 MG/1
14 TABLET ORAL DAILY
Qty: 30 TABLET | Refills: 6 | Status: SHIPPED | OUTPATIENT
Start: 2024-12-18

## 2024-12-18 NOTE — ADDENDUM NOTE
Addended by: HANS ALVAREZ on: 12/18/2024 09:45 AM     Modules accepted: Orders     Fluconazole Pregnancy And Lactation Text: This medication is Pregnancy Category C and it isn't know if it is safe during pregnancy. It is also excreted in breast milk.

## 2024-12-18 NOTE — TELEPHONE ENCOUNTER
1. Multiple sclerosis (HCC)    - Aubagio 14 MG TABS; Take 1 tablet (14 mg total) by mouth in the morning  Dispense: 30 tablet; Refill: 6        Luisito Powers MD.   Staff Neurologist  12/18/24   12:35 PM

## 2024-12-18 NOTE — TELEPHONE ENCOUNTER
Maggie Ureña, I have called patient twice at that same number [phone] goes to  and does not give option to .

## 2024-12-18 NOTE — TELEPHONE ENCOUNTER
Please use [phone], patient's new number. Updated demographics earlier today with number and removed old number.

## 2025-03-13 ENCOUNTER — TELEPHONE (OUTPATIENT)
Dept: NEPHROLOGY | Facility: CLINIC | Age: 65
End: 2025-03-13

## 2025-03-17 LAB
ANION GAP SERPL CALCULATED.3IONS-SCNC: 12 MMOL/L (ref 3–11)
BACTERIA URNS QL MICRO: ABNORMAL
BASOPHILS # BLD AUTO: 0 THOU/CMM (ref 0–0.1)
BASOPHILS NFR BLD AUTO: 1 %
BUN SERPL-MCNC: 16 MG/DL (ref 7–25)
C3 SERPL-MCNC: 224 MG/DL (ref 87–200)
C4 SERPL-MCNC: 73.9 MG/DL (ref 19–52)
CALCIUM SERPL-MCNC: 10.3 MG/DL (ref 8.5–10.5)
CHLORIDE SERPL-SCNC: 105 MMOL/L (ref 100–109)
CO2 SERPL-SCNC: 27 MMOL/L (ref 21–31)
CREAT SERPL-MCNC: 1.09 MG/DL (ref 0.4–1.1)
CREAT UR-MCNC: 125.9 MG/DL (ref 50–200)
CYTOLOGY CMNT CVX/VAG CYTO-IMP: ABNORMAL
DIFFERENTIAL METHOD BLD: NORMAL
EOSINOPHIL # BLD AUTO: 0.1 THOU/CMM (ref 0–0.5)
EOSINOPHIL NFR BLD AUTO: 1 %
ERYTHROCYTE [DISTWIDTH] IN BLOOD BY AUTOMATED COUNT: 14.6 % (ref 12–16)
GFR/BSA.PRED SERPLBLD CYS-BASED-ARV: 56 ML/MIN/{1.73_M2}
GLUCOSE SERPL-MCNC: 103 MG/DL (ref 65–99)
GLUCOSE UR QL STRIP: NEGATIVE MG/DL
HCT VFR BLD AUTO: 37.8 % (ref 35–43)
HGB BLD-MCNC: 12.1 G/DL (ref 11.5–14.5)
HGB UR QL STRIP: NEGATIVE MG/DL
IGA SERPL-MCNC: 176 MG/DL (ref 83–407)
IGG SERPL-MCNC: 851 MG/DL (ref 680–1445)
IGM SERPL-MCNC: 59 MG/DL (ref 34–214)
KETONES UR QL STRIP: NEGATIVE MG/DL
LEUKOCYTE ESTERASE UR QL STRIP: 500 /UL
LYMPHOCYTES # BLD AUTO: 1.6 THOU/CMM (ref 1–3)
LYMPHOCYTES NFR BLD AUTO: 25 %
MCH RBC QN AUTO: 29.5 PG (ref 26–34)
MCHC RBC AUTO-ENTMCNC: 32.1 G/DL (ref 32–37)
MCV RBC AUTO: 92 FL (ref 80–100)
MONOCYTES # BLD AUTO: 0.6 THOU/CMM (ref 0.3–1)
MONOCYTES NFR BLD AUTO: 10 %
MUCOUS THREADS URNS QL MICRO: ABNORMAL
NEUTROPHILS # BLD AUTO: 4.2 THOU/CMM (ref 1.8–7.8)
NEUTROPHILS NFR BLD AUTO: 63 %
NITRITE UR QL STRIP: POSITIVE
PH UR: 6 [PH] (ref 4.5–8)
PLATELET # BLD AUTO: 308 THOU/CMM (ref 140–350)
PMV BLD REES-ECKER: 10.4 FL (ref 7.5–11.3)
POTASSIUM SERPL-SCNC: 4.2 MMOL/L (ref 3.5–5.2)
PROT 24H UR-MRATE: NEGATIVE MG/DL
PROT UR-MCNC: 19.1 MG/DL
PROT/CREAT UR: 0.15
RBC # BLD AUTO: 4.12 MILL/CMM (ref 3.7–4.7)
RBC #/AREA URNS HPF: ABNORMAL /HPF (ref 0–2)
RHEUMATOID FACT SER QL LA: 10 IU/ML
SL AMB POCT URINE COMMENT: ABNORMAL
SODIUM SERPL-SCNC: 144 MMOL/L (ref 135–145)
SP GR UR: 1.01 (ref 1–1.03)
SQUAMOUS #/AREA URNS HPF: >10 /LPF (ref 0–5)
WBC # BLD AUTO: 6.6 THOU/CMM (ref 4–10)
WBC #/AREA URNS HPF: ABNORMAL /HPF (ref 0–5)

## 2025-03-18 LAB
ALBUMIN SERPL ELPH-MCNC: 3.9 G/DL (ref 4.1–5.1)
ALBUMIN/GLOB SERPL: 1.1 {RATIO}
ALPHA1 GLOB SERPL ELPH-MCNC: 0.5 G/DL (ref 0.1–0.2)
ALPHA2 GLOB SERPL ELPH-MCNC: 1.2 G/DL (ref 0.6–0.9)
ANA SER QL IF: PRESENT
B-GLOBULIN SERPL ELPH-MCNC: 0.9 G/DL (ref 0.6–1)
GAMMA GLOB SERPL ELPH-MCNC: 0.8 G/DL (ref 0.5–1.2)
INTERPRETATION SERPL IFE-IMP: NORMAL
PROT PATTERN SERPL ELPH-IMP: ABNORMAL
PROT SERPL-MCNC: 7.3 G/DL (ref 6.3–8.3)
SL AMB CYTOPLASMIC: 160 TITER
SL AMB PROTEIN ELECTROPHORESIS, S: ABNORMAL

## 2025-03-19 LAB
ALBUMIN MFR UR ELPH: 25.5 %
ALPHA1 GLOB ?TM MFR UR ELPH: 28.1 %
ALPHA2 GLOB MFR UR ELPH: 19.6 %
B-GLOBULIN MFR UR ELPH: 14.7 %
DSDNA IGG SERPL IA-ACNC: NEGATIVE TITER
ENA SCL70 AB SER-ACNC: NEGATIVE
ENA SM+RNP AB SER-ACNC: NEGATIVE
ENA SS-A AB SER-ACNC: <20 ELISA UNIT
ENA SS-B AB SER-ACNC: <20 ELISA UNIT
GAMMA GLOB MFR UR ELPH: 12.1 %
PROT PATTERN UR ELPH-IMP: ABNORMAL
PROT UR-MCNC: 20.1 MG/DL
SL AMB SMITH ANTIBODIES: NEGATIVE

## 2025-03-21 ENCOUNTER — TELEPHONE (OUTPATIENT)
Dept: NEPHROLOGY | Facility: CLINIC | Age: 65
End: 2025-03-21

## 2025-03-21 NOTE — TELEPHONE ENCOUNTER
I called Medicare A & B @ [phone] (Automated System) to check eligible for the patient and as of 3/21/2025 and according to the Automated System the patient Medicare Part A was effective as of 2/1/2025 and Medicare Part B was effective as of 5/1/2024. Patient Medicare is Primary and Patient's [facility] is Secondary.  Isabell Driscoll, .

## 2025-03-21 NOTE — TELEPHONE ENCOUNTER
I called patient to remind patient to have fasting lab work done prior to appointment for Tuesday 03/25/2025 with Dr. Ambriz. Unable to leave a message due to patients Mail box is full and can not receive a messages at this time

## 2025-03-24 ENCOUNTER — TELEPHONE (OUTPATIENT)
Age: 65
End: 2025-03-24

## 2025-03-24 DIAGNOSIS — G35 MULTIPLE SCLEROSIS (HCC): Primary | ICD-10-CM

## 2025-03-24 NOTE — TELEPHONE ENCOUNTER
Shruthi Goodwin JR    3/24/25 12:37 PM  Note      Yoselyn From [facility] specialty pharmacy called in stating they are needing the authorization forms filled out by our provider. She IS stating these were sent to us on March 3rd.         Shruthi Goodwin to Neurology Pod Clinical   JR      3/24/25 12:38 PM   Prior auth needed    3/24/25  2:28 PM  Radha Bah, RN routed this conversation to Neurology Multiple

## 2025-03-24 NOTE — TELEPHONE ENCOUNTER
PA Key: [id number] for Aubagio initiated on CMM. Recd the following msg:     Acknowledge: The requested drug is not covered by the plan. In order for it to be covered, the member is required to have tried and failed all formulary alternatives or confirm that you believe the formulary alternatives would have serious adverse effects and/or be ineffective for the patient. Please answer if the following covered formulary alternatives have been tried and failed, are likely to cause adverse reaction, or are likely to be ineffective    Patient must have tried and failed:  Bafiertam  Fingolimod  Teriflunomide     Dr. Powers - are you agreeable to pt switching from Brand Aubagio to generic teriflunomide? If agreeable, please enter rx for teriflunomide and cancel rx for Brand Aubagio as nursing is unable to do so. Thank you!

## 2025-03-24 NOTE — TELEPHONE ENCOUNTER
Per 3/27/24 LOV note:  Return in about 6 months (around 9/27/2024).    9/16/24 NP appt w/Dr. Powers - NO SHOW    Scheduling - please reach out to schedule f/u appt. Thank you.     Called [facility]. Spoke w/Ivonne. She confirmed PA is needed and Aubagio last delivered on 2/7/25. Provided the following insurance info:    [employer] MEDDADV    ID: [id number]  BIN: [id number]  PCN: [id number]  GRP: [id number]    [phone]

## 2025-03-25 ENCOUNTER — OFFICE VISIT (OUTPATIENT)
Dept: NEPHROLOGY | Facility: CLINIC | Age: 65
End: 2025-03-25
Payer: MEDICARE

## 2025-03-25 VITALS
TEMPERATURE: 97.4 F | RESPIRATION RATE: 16 BRPM | WEIGHT: 178 LBS | HEIGHT: 71 IN | OXYGEN SATURATION: 95 % | DIASTOLIC BLOOD PRESSURE: 70 MMHG | HEART RATE: 72 BPM | BODY MASS INDEX: 24.92 KG/M2 | SYSTOLIC BLOOD PRESSURE: 120 MMHG

## 2025-03-25 DIAGNOSIS — N18.31 STAGE 3A CHRONIC KIDNEY DISEASE (HCC): ICD-10-CM

## 2025-03-25 DIAGNOSIS — M89.9 CHRONIC KIDNEY DISEASE-MINERAL AND BONE DISORDER: ICD-10-CM

## 2025-03-25 DIAGNOSIS — R80.1 PERSISTENT PROTEINURIA: ICD-10-CM

## 2025-03-25 DIAGNOSIS — N18.9 CHRONIC KIDNEY DISEASE-MINERAL AND BONE DISORDER: ICD-10-CM

## 2025-03-25 DIAGNOSIS — E83.9 CHRONIC KIDNEY DISEASE-MINERAL AND BONE DISORDER: ICD-10-CM

## 2025-03-25 DIAGNOSIS — I27.20 PULMONARY HYPERTENSION (HCC): ICD-10-CM

## 2025-03-25 DIAGNOSIS — I10 ESSENTIAL HYPERTENSION: Chronic | ICD-10-CM

## 2025-03-25 DIAGNOSIS — N18.2 STAGE 2 CHRONIC KIDNEY DISEASE: Primary | ICD-10-CM

## 2025-03-25 PROCEDURE — 99214 OFFICE O/P EST MOD 30 MIN: CPT | Performed by: INTERNAL MEDICINE

## 2025-03-25 RX ORDER — TERIFLUNOMIDE 14 MG/1
TABLET, FILM COATED ORAL
Qty: 90 TABLET | Refills: 3 | Status: SHIPPED | OUTPATIENT
Start: 2025-03-25

## 2025-03-25 NOTE — TELEPHONE ENCOUNTER
Thank you Mouna,     I have ordered the generic Aubagio 14 mg daily.     1. Multiple sclerosis (HCC) (Primary)    - Teriflunomide 14 MG TABS; Take one tab daily with food.  Dispense: 90 tablet; Refill: 3        Luisito Powers MD.   Staff Neurologist  03/25/25   10:52 AM

## 2025-03-25 NOTE — ASSESSMENT & PLAN NOTE
Lab Results   Component Value Date    EGFR 56 (L) 03/17/2025    EGFR 56 (L) 03/17/2025    EGFR 59 (L) 02/07/2025    CREATININE 1.09 03/17/2025    CREATININE 1.09 03/17/2025    CREATININE 1.04 02/07/2025   PTH and phosphorus along with vitamin D are within acceptable range and will continue to monitor

## 2025-03-25 NOTE — ASSESSMENT & PLAN NOTE
Lab Results   Component Value Date    EGFR 56 (L) 03/17/2025    EGFR 56 (L) 03/17/2025    EGFR 59 (L) 02/07/2025    CREATININE 1.09 03/17/2025    CREATININE 1.09 03/17/2025    CREATININE 1.04 02/07/2025     Fluctuating kidney function but stable overall.  Advise hydration and avoiding nephrotoxic medic

## 2025-03-25 NOTE — PROGRESS NOTES
Name: Molly Villafana      : 1960      MRN: [id number]  Encounter Provider: Agapito Ambriz MD  Encounter Date: 3/25/2025   Encounter department: [facility]  :  Assessment & Plan  Stage 2 chronic kidney disease  Lab Results   Component Value Date    EGFR 56 (L) 2025    EGFR 56 (L) 2025    EGFR 59 (L) 2025    CREATININE 1.09 2025    CREATININE 1.09 2025    CREATININE 1.04 2025     Fluctuating kidney function but stable overall.  Advise hydration and avoiding nephrotoxic medic       Chronic kidney disease-mineral and bone disorder  Lab Results   Component Value Date    EGFR 56 (L) 2025    EGFR 56 (L) 2025    EGFR 59 (L) 2025    CREATININE 1.09 2025    CREATININE 1.09 2025    CREATININE 1.04 2025   PTH and phosphorus along with vitamin D are within acceptable range and will continue to monitor         Essential hypertension  Very well-controlled with losartan       Persistent proteinuria  Nonnephrotic range and stable on angiotensin receptor blocker       Everything discussed with the patient.  I will see her back in 1 year.  Will get blood and urine test before that visit    History of Present Illness   HPI  Molly Villafana is a 64 y.o. female who presents came for CKD follow-up    Overall doing well and denies any acute complaint    No chest pain no palpitation    No nausea no vomiting      Review of Systems   Constitutional:  Negative for fatigue.   HENT:  Negative for congestion.    Eyes:  Negative for photophobia and pain.   Respiratory:  Negative for chest tightness and shortness of breath.    Cardiovascular:  Negative for chest pain and palpitations.   Gastrointestinal:  Negative for abdominal distention, abdominal pain and blood in stool.   Endocrine: Negative for polydipsia.   Genitourinary:  Negative for difficulty urinating, dysuria, flank pain, hematuria and urgency.   Musculoskeletal:  Negative  "for arthralgias and back pain.   Skin:  Negative for rash.   Neurological:  Negative for dizziness, light-headedness and headaches.   Hematological:  Does not bruise/bleed easily.   Psychiatric/Behavioral:  Negative for behavioral problems. The patient is not nervous/anxious.           Objective   Pulse 72   Temp (!) 97.4 °F (36.3 °C) (Temporal)   Resp 16   Ht 5' 11\" (1.803 m)   Wt 80.7 kg (178 lb)   SpO2 95%   BMI 24.83 kg/m²      Physical Exam  Constitutional:       General: She is not in acute distress.     Appearance: She is well-developed.   HENT:      Head: Normocephalic.      Mouth/Throat:      Mouth: Mucous membranes are moist.   Eyes:      General: No scleral icterus.     Conjunctiva/sclera: Conjunctivae normal.   Neck:      Vascular: No JVD.   Cardiovascular:      Rate and Rhythm: Normal rate.      Heart sounds: Normal heart sounds.   Pulmonary:      Effort: Pulmonary effort is normal.      Breath sounds: No wheezing.   Abdominal:      Palpations: Abdomen is soft.      Tenderness: There is no abdominal tenderness.   Musculoskeletal:         General: Normal range of motion.      Cervical back: Neck supple.   Skin:     General: Skin is warm.      Findings: No rash.   Neurological:      Mental Status: She is alert and oriented to person, place, and time.   Psychiatric:         Behavior: Behavior normal.           "

## 2025-03-26 NOTE — TELEPHONE ENCOUNTER
Called pt to advise of switch from Brand Aubagio to generic terflunomide. Recd vm. Mailbox is full. Unable to leave detailed msg.

## 2025-07-22 ENCOUNTER — TELEPHONE (OUTPATIENT)
Dept: NEPHROLOGY | Facility: CLINIC | Age: 65
End: 2025-07-22

## 2025-07-22 NOTE — TELEPHONE ENCOUNTER
1st attempt :Called patient unable to contact patient has a call restriction and not receiving any calls. Message send to patient via US mail.    Patient is on March 2026 recall list with our nephrology provider MD Rina.     Please schedule patient on or after 03/26/26 with MD Rina.

## 2025-07-28 ENCOUNTER — TELEPHONE (OUTPATIENT)
Dept: NEPHROLOGY | Facility: CLINIC | Age: 65
End: 2025-07-28